# Patient Record
Sex: FEMALE | Race: AMERICAN INDIAN OR ALASKA NATIVE | HISPANIC OR LATINO | Employment: OTHER | URBAN - METROPOLITAN AREA
[De-identification: names, ages, dates, MRNs, and addresses within clinical notes are randomized per-mention and may not be internally consistent; named-entity substitution may affect disease eponyms.]

---

## 2017-01-30 ENCOUNTER — ALLSCRIPTS OFFICE VISIT (OUTPATIENT)
Dept: OTHER | Facility: OTHER | Age: 82
End: 2017-01-30

## 2017-02-23 ENCOUNTER — ALLSCRIPTS OFFICE VISIT (OUTPATIENT)
Dept: OTHER | Facility: OTHER | Age: 82
End: 2017-02-23

## 2017-03-02 ENCOUNTER — LAB CONVERSION - ENCOUNTER (OUTPATIENT)
Dept: OTHER | Facility: OTHER | Age: 82
End: 2017-03-02

## 2017-03-02 ENCOUNTER — GENERIC CONVERSION - ENCOUNTER (OUTPATIENT)
Dept: OTHER | Facility: OTHER | Age: 82
End: 2017-03-02

## 2017-03-02 LAB — HBA1C MFR BLD HPLC: 6.3 % OF TOTAL HGB

## 2017-04-10 ENCOUNTER — ALLSCRIPTS OFFICE VISIT (OUTPATIENT)
Dept: OTHER | Facility: OTHER | Age: 82
End: 2017-04-10

## 2017-06-05 DIAGNOSIS — Z12.31 ENCOUNTER FOR SCREENING MAMMOGRAM FOR MALIGNANT NEOPLASM OF BREAST: ICD-10-CM

## 2017-06-12 ENCOUNTER — HOSPITAL ENCOUNTER (OUTPATIENT)
Dept: RADIOLOGY | Facility: HOSPITAL | Age: 82
Discharge: HOME | End: 2017-06-12
Payer: COMMERCIAL

## 2017-06-12 ENCOUNTER — CONVERSION ENCOUNTER (OUTPATIENT)
Dept: RADIOLOGY | Facility: HOSPITAL | Age: 82
End: 2017-06-12

## 2017-06-12 DIAGNOSIS — Z12.31 VISIT FOR SCREENING MAMMOGRAM: ICD-10-CM

## 2017-06-12 DIAGNOSIS — Z12.31 ENCOUNTER FOR SCREENING MAMMOGRAM FOR MALIGNANT NEOPLASM OF BREAST: ICD-10-CM

## 2017-06-12 PROCEDURE — G0202 SCR MAMMO BI INCL CAD: HCPCS

## 2017-06-13 ENCOUNTER — GENERIC CONVERSION - ENCOUNTER (OUTPATIENT)
Dept: OTHER | Facility: OTHER | Age: 82
End: 2017-06-13

## 2017-09-02 ENCOUNTER — LAB CONVERSION - ENCOUNTER (OUTPATIENT)
Dept: OTHER | Facility: OTHER | Age: 82
End: 2017-09-02

## 2017-09-02 LAB
A/G RATIO (HISTORICAL): 1.6 (CALC) (ref 1–2.5)
ALBUMIN SERPL BCP-MCNC: 4.5 G/DL (ref 3.6–5.1)
ALP SERPL-CCNC: 88 U/L (ref 33–130)
ALT SERPL W P-5'-P-CCNC: 33 U/L (ref 6–29)
AST SERPL W P-5'-P-CCNC: 36 U/L (ref 10–35)
BILIRUB SERPL-MCNC: 0.6 MG/DL (ref 0.2–1.2)
BUN SERPL-MCNC: 30 MG/DL (ref 7–25)
BUN/CREA RATIO (HISTORICAL): 19 (CALC) (ref 6–22)
CALCIUM (ADJUSTED FOR ALBUMIN) (HISTORICAL): 10.2 MG/DL (CALC) (ref 8.6–10.2)
CALCIUM SERPL-MCNC: 10.3 MG/DL (ref 8.6–10.4)
CHLORIDE SERPL-SCNC: 99 MMOL/L (ref 98–110)
CHOLEST SERPL-MCNC: 176 MG/DL
CHOLEST/HDLC SERPL: 3.3 (CALC)
CO2 SERPL-SCNC: 29 MMOL/L (ref 20–31)
CREAT SERPL-MCNC: 1.6 MG/DL (ref 0.6–0.88)
EGFR AFRICAN AMERICAN (HISTORICAL): 35 ML/MIN/1.73M2
EGFR-AMERICAN CALC (HISTORICAL): 30 ML/MIN/1.73M2
GAMMA GLOBULIN (HISTORICAL): 2.9 G/DL (CALC) (ref 1.9–3.7)
GLUCOSE (HISTORICAL): 124 MG/DL (ref 65–99)
HBA1C MFR BLD HPLC: 6.3 % OF TOTAL HGB
HDLC SERPL-MCNC: 54 MG/DL
LDL CHOLESTEROL (HISTORICAL): 105 MG/DL (CALC)
NON-HDL-CHOL (CHOL-HDL) (HISTORICAL): 122 MG/DL (CALC)
POTASSIUM SERPL-SCNC: 4.3 MMOL/L (ref 3.5–5.3)
SODIUM SERPL-SCNC: 141 MMOL/L (ref 135–146)
TOTAL PROTEIN (HISTORICAL): 7.4 G/DL (ref 6.1–8.1)
TRIGL SERPL-MCNC: 80 MG/DL
TSH SERPL DL<=0.05 MIU/L-ACNC: 2.13 MIU/L (ref 0.4–4.5)

## 2017-09-04 ENCOUNTER — GENERIC CONVERSION - ENCOUNTER (OUTPATIENT)
Dept: OTHER | Facility: OTHER | Age: 82
End: 2017-09-04

## 2017-10-05 ENCOUNTER — GENERIC CONVERSION - ENCOUNTER (OUTPATIENT)
Dept: OTHER | Facility: OTHER | Age: 82
End: 2017-10-05

## 2017-10-10 ENCOUNTER — ALLSCRIPTS OFFICE VISIT (OUTPATIENT)
Dept: OTHER | Facility: OTHER | Age: 82
End: 2017-10-10

## 2017-12-04 ENCOUNTER — ALLSCRIPTS OFFICE VISIT (OUTPATIENT)
Dept: OTHER | Facility: OTHER | Age: 82
End: 2017-12-04

## 2017-12-05 NOTE — PROGRESS NOTES
Assessment    1  Acute bilateral low back pain without sciatica ([phone]) (M54 5)    Plan  Acute bilateral low back pain without sciatica    · Cyclobenzaprine HCl - 5 MG Oral Tablet; TAKE 1 TABLET Bedtime PRN SPASM  tylenol and heat for pain   Chief Complaint    1  Back Pain  Patient here with lower back pain for 3 days after pulling bed away from the wall to clean windows going down into right buttock      History of Present Illness  HPI: moved her bed without helphurt friday nighticey/hot, bengay   Back Pain: Melanie Wu presents with complaints of back pain  Associated symptoms include spasm, but-- no stiffness,-- no fever,-- no night sweats,-- no abdominal pain,-- no general malaise,-- no weight loss,-- no arm numbness,-- no leg numbness,-- no arm weakness,-- no leg weakness,-- no urinary incontinence,-- no fecal incontinence,-- no urinary retention-- and-- no rash localized to the area of pain  Review of Systems   Constitutional: No fever, no chills, feels well, no tiredness, no recent weight gain or loss  ENT: no ear ache, no loss of hearing, no nosebleeds or nasal discharge, no sore throat or hoarseness  Cardiovascular: no complaints of slow or fast heart rate, no chest pain, no palpitations, no leg claudication or lower extremity edema  Respiratory: no complaints of shortness of breath, no wheezing, no dyspnea on exertion, no orthopnea or PND  Breasts: no complaints of breast pain, breast lump or nipple discharge  Gastrointestinal: no complaints of abdominal pain, no constipation, no nausea or diarrhea, no vomiting, no bloody stools  Genitourinary: no complaints of dysuria, no incontinence, no pelvic pain, no dysmenorrhea, no vaginal discharge or abnormal vaginal bleeding  Musculoskeletal: as noted in HPI  Integumentary: no complaints of skin rash or lesion, no itching or dry skin, no skin wounds    Neurological: no complaints of headache, no confusion, no numbness or tingling, no dizziness or fainting  Active Problems  1  Aneurysm of ascending aorta (441 2) (I71 2)   2  Aortic aneurysm (441 9) (I71 9)   3  Arteriosclerosis of carotid artery (433 10) (I65 29)   4  Arteriosclerosis of coronary artery (414 00) (I25 10)   5  CAD in native artery (414 01) (I25 10)   6  Carotid atherosclerosis, unspecified laterality (433 10) (I65 29)   7  Chronic kidney disease, stage 3 (585 3) (N18 3)   8  GERD without esophagitis (530 81) (K21 9)   9  Glaucoma screening (V80 1) (Z13 5)   10  Hyperlipidemia (272 4) (E78 5)   11  Hypertension (401 9) (I10)   12  Hypertensive heart disease (402 90) (I11 9)   13  Impaired fasting glucose (790 21) (R73 01)   14  MÃÂ©niÃÂ¨re's disease (386 00) (H81 09)   15  Need for pneumococcal vaccination (V03 82) (Z23)   16  Need for prophylactic vaccination and inoculation against influenza (V04 81) (Z23)   17  Osteoporosis screening (V82 81) (Z13 820)   18  Polyp of sigmoid colon (211 3) (D12 5)   19  Renal Insufficiency (593 9)   20  Special screening for other neurological conditions (V80 09) (Z13 89)   21  Visit for screening mammogram (V76 12) (Z12 31)   22  Well adult on routine health check (V70 0) (Z00 00)    Past Medical History  1  History of Acute bronchitis due to other specified organisms (466 0) (J20 8)   2  History of Dysuria (788 1) (R30 0)   3  History of hematuria (V13 09) (Z87 448)   4  History of shortness of breath (V13 89) (Z87 898)   5  History of urinary tract infection (V13 02) (Z87 440)   6  Need for prophylactic vaccination and inoculation against influenza (V04 81) (Z23)    Family History  Mother    1  Family history of Diabetes Mellitus (V18 0)   2  Family history of Glaucoma   3  Family history of Hypertension (V17 49)   4  Family history of Macular Degeneration  Father    5  Family history of Hypertension (V17 49)   6  Family history of Stroke Syndrome (V17 1)  Sister    7  Family history of dementia (V17 2) (Z81 8)   8   Family history of Heart Disease (V17 49)  Family History    9  Family history of Family Health Status 2  Children Living   10  Family history of Family Health Status Siblings 6  Living Sisters    Social History   · Denied: History of Alcohol Use (History)   · Daily Coffee Consumption (1  Cups/Day)   · Denied: History of Drug Use   · Former smoker (F49 29) (C98 007)    Surgical History    1  History of Adenoidectomy   2  History of Appendectomy   3  History of Cataract Surgery   4  History of Cervical Vertebral Fusion   5  History of Hysterectomy   6  History of Paravaginal Defect Graft-Reinforced Repair   7  History of Rectocele Repair   8  History of Tonsillectomy   9  History of Venous Ligation With Stripping    Current Meds   1  Aspirin 81 MG TABS; take 1 tab daily; Therapy: (Recorded:89Jcw1109) to Recorded   2  Metoprolol Tartrate 50 MG Oral Tablet; take 1 tab daily; Therapy: (Recorded:42Aoy9967) to Recorded   3  Omeprazole 20 MG Oral Capsule Delayed Release; take 1 capsule by mouth daily; Therapy: 45CXC9664 to (Evaluate:34Icr7985)  Requested for: 65Mcw2297; Last Rx:57Swl1588 Ordered   4  Triamterene-HCTZ 37 5-25 MG Oral Tablet; take 1 tablet by mouth once daily; Therapy: 38WDF2712 to (Teresa Dye)  Requested for: 98Jtr7129; Last Rx:46Mox2371 Ordered    Allergies  1  No Known Drug Allergies  2  No Known Environmental Allergies   3  No Known Food Allergies    Vitals   Recorded: 58HCQ7661 11:17AM   Heart Rate 60   Respiration 16   Systolic 556   Diastolic 60   Height 5 ft 3 in   Weight 155 lb 6 oz   BMI Calculated 27 52   BSA Calculated 1 74   Pain Scale 9       Physical Exam   Musculoskeletal  Gait and station: Normal    Inspection/palpation of joints, bones, and muscles: Abnormal  -- paraspinal muscle tenderness          Future Appointments    Date/Time Provider Specialty Site   04/10/2018 08:00 AM Esperanza Lopez DO Family Medicine [facility]   10/11/2018 08:00 AM Esperanza Lopez DO [facility] [address]   Electronically signed by : Alice Bautista DO; Dec  4 2017 11:28AM EST                       (Author)

## 2017-12-07 ENCOUNTER — APPOINTMENT (EMERGENCY)
Dept: RADIOLOGY | Facility: HOSPITAL | Age: 82
End: 2017-12-07
Payer: COMMERCIAL

## 2017-12-07 ENCOUNTER — HOSPITAL ENCOUNTER (EMERGENCY)
Facility: HOSPITAL | Age: 82
Discharge: HOME | End: 2017-12-07
Admitting: EMERGENCY MEDICINE
Payer: COMMERCIAL

## 2017-12-07 VITALS
TEMPERATURE: 96.8 F | BODY MASS INDEX: 26.46 KG/M2 | OXYGEN SATURATION: 95 % | SYSTOLIC BLOOD PRESSURE: 138 MMHG | RESPIRATION RATE: 18 BRPM | DIASTOLIC BLOOD PRESSURE: 65 MMHG | WEIGHT: 155 LBS | HEART RATE: 58 BPM | HEIGHT: 64 IN

## 2017-12-07 DIAGNOSIS — S33.5XXA LUMBAR SPRAIN, INITIAL ENCOUNTER: Primary | ICD-10-CM

## 2017-12-07 PROCEDURE — 72100 X-RAY EXAM L-S SPINE 2/3 VWS: CPT

## 2017-12-07 PROCEDURE — 99283 EMERGENCY DEPT VISIT LOW MDM: CPT

## 2017-12-07 RX ORDER — ACETAMINOPHEN 325 MG/1
650 TABLET ORAL ONCE
Status: COMPLETED | OUTPATIENT
Start: 2017-12-07 | End: 2017-12-07

## 2017-12-07 RX ORDER — OMEPRAZOLE 20 MG/1
20 CAPSULE, DELAYED RELEASE ORAL DAILY
COMMUNITY
End: 2018-12-12 | Stop reason: SDUPTHER

## 2017-12-07 RX ORDER — TRIAMTERENE AND HYDROCHLOROTHIAZIDE 37.5; 25 MG/1; MG/1
TABLET ORAL
COMMUNITY
Start: 2016-09-16 | End: 2018-09-04 | Stop reason: SDUPTHER

## 2017-12-07 RX ORDER — METOPROLOL TARTRATE 50 MG/1
TABLET, FILM COATED ORAL
COMMUNITY
Start: 2016-07-25 | End: 2018-07-08 | Stop reason: SDUPTHER

## 2017-12-07 RX ORDER — LIDOCAINE 50 MG/G
1 PATCH TOPICAL DAILY
Qty: 30 PATCH | Refills: 0 | Status: SHIPPED | OUTPATIENT
Start: 2017-12-07 | End: 2018-04-05 | Stop reason: ALTCHOICE

## 2017-12-07 RX ORDER — LIDOCAINE 50 MG/G
1 PATCH TOPICAL ONCE
Status: DISCONTINUED | OUTPATIENT
Start: 2017-12-07 | End: 2017-12-07 | Stop reason: HOSPADM

## 2017-12-07 RX ADMIN — ACETAMINOPHEN 650 MG: 325 TABLET, FILM COATED ORAL at 11:01

## 2017-12-07 RX ADMIN — LIDOCAINE 1 PATCH: 50 PATCH TOPICAL at 11:01

## 2017-12-07 NOTE — DISCHARGE INSTRUCTIONS
Acute Low Back Pain   WHAT YOU NEED TO KNOW:   Acute low back pain is sudden discomfort in your lower back area that lasts for up to 6 weeks  The discomfort makes it difficult to tolerate activity  DISCHARGE INSTRUCTIONS:   Return to the emergency department if:   · You have severe pain  · You have sudden stiffness and heaviness on both buttocks down to both legs  · You have numbness or weakness in one leg, or pain in both legs  · You have numbness in your genital area or across your lower back  · You cannot control your urine or bowel movements  Contact your healthcare provider if:   · You have a fever  · You have pain at night or when you rest     · Your pain does not get better with treatment  · You have pain that worsens when you cough or sneeze  · You suddenly feel something pop or snap in your back  · You have questions or concerns about your condition or care  Medicines: The following medicines may be ordered by your healthcare provider:  · Acetaminophen  decreases pain  It is available without a doctor's order  Ask how much to take and how often to take it  Follow directions  Acetaminophen can cause liver damage if not taken correctly  · NSAIDs  help decrease swelling and pain  This medicine is available with or without a doctor's order  NSAIDs can cause stomach bleeding or kidney problems in certain people  If you take blood thinner medicine, always ask your healthcare provider if NSAIDs are safe for you  Always read the medicine label and follow directions  · Prescription pain medicine  may be given  Ask your healthcare provider how to take this medicine safely  · Muscle relaxers  decrease pain by relaxing the muscles in your lower spine  · Take your medicine as directed  Contact your healthcare provider if you think your medicine is not helping or if you have side effects  Tell him of her if you are allergic to any medicine   Keep a list of the medicines, vitamins, and herbs you take  Include the amounts, and when and why you take them  Bring the list or the pill bottles to follow-up visits  Carry your medicine list with you in case of an emergency  Self-care:   · Stay active  as much as you can without causing more pain  Bed rest could make your back pain worse  Start with some light exercises such as walking  Avoid heavy lifting until your pain is gone  Ask for more information about the activities or exercises that are right for you  · Ice  helps decrease swelling, pain, and muscle spams  Put crushed ice in a plastic bag  Cover it with a towel  Place it on your lower back for 20 to 30 minutes every 2 hours  Do this for about 2 to 3 days after your pain starts, or as directed  · Heat  helps decrease pain and muscle spasms  Start to use heat after treatment with ice has stopped  Use a small towel dampened with warm water or a heating pad, or sit in a warm bath  Apply heat on the area for 20 to 30 minutes every 2 hours for as many days as directed  Alternate heat and ice  Prevent acute low back pain:   · Use proper body mechanics  ¨ Bend at the hips and knees when you  objects  Do not bend from the waist  Use your leg muscles as you lift the load  Do not use your back  Keep the object close to your chest as you lift it  Try not to twist or lift anything above your waist     ¨ Change your position often when you stand for long periods of time  Rest one foot on a small box or footrest, and then switch to the other foot often  ¨ Try not to sit for long periods of time  When you do, sit in a straight-backed chair with your feet flat on the floor  Never reach, pull, or push while you are sitting  · Do exercises that strengthen your back muscles  Warm up before you exercise  Ask your healthcare provider the best exercises for you  · Maintain a healthy weight  Ask your healthcare provider how much you should weigh   Ask him to help you create a weight loss plan if you are overweight  Follow up with your healthcare provider as directed:  Return for a follow-up visit if you still have pain after 1 to 3 weeks of treatment  You may need to visit an orthopedist if your back pain lasts more than 12 weeks  Write down your questions so you remember to ask them during your visits  © 2017 [employer]  Information is for End User's use only and may not be sold, redistributed or otherwise used for commercial purposes  All illustrations and images included in CareNotes® are the copyrighted property of [employer]  or [address]  The above information is an  only  It is not intended as medical advice for individual conditions or treatments  Talk to your doctor, nurse or pharmacist before following any medical regimen to see if it is safe and effective for you

## 2018-01-11 NOTE — RESULT NOTES
Discussion/Summary   KIDNEY FUNCTION SLIGHTLY WORSE  SUGAR IS OK  WILL DISCUSS WITH YOU AT APPT  DR Arian Prakash     Verified Results  (Q) COMPREHENSIVE METABOLIC PNL W/ADJUSTED CALCIUM 01Sep2017 07:22AM [employer]     Test Name Result Flag Reference   GLUCOSE 124 mg/dL H 65-99   Fasting reference interval     For someone without known diabetes, a glucose value  between 100 and 125 mg/dL is consistent with  prediabetes and should be confirmed with a  follow-up test    UREA NITROGEN (BUN) 30 mg/dL H 7-25   CREATININE 1 60 mg/dL H 0 60-0 88   For patients >52years of age, the reference limit  for Creatinine is approximately 13% higher for people  identified as -American  eGFR NON-AFR  AMERICAN 30 mL/min/1 73m2 L > OR = 60   eGFR AFRICAN AMERICAN 35 mL/min/1 73m2 L > OR = 60   BUN/CREATININE RATIO 19 (calc)  6-22   SODIUM 141 mmol/L  135-146   POTASSIUM 4 3 mmol/L  3 5-5 3   CHLORIDE 99 mmol/L     CARBON DIOXIDE 29 mmol/L  20-31   CALCIUM 10 3 mg/dL  8 6-10 4   CALCIUM (ADJUSTED FOR$ALBUMIN) 10 2 mg/dL (calc)  8 6-10 2   PROTEIN, TOTAL 7 4 g/dL  6 1-8 1   ALBUMIN 4 5 g/dL  3 6-5 1   GLOBULIN 2 9 g/dL (calc)  1 9-3 7   ALBUMIN/GLOBULIN RATIO 1 6 (calc)  1 0-2 5   BILIRUBIN, TOTAL 0 6 mg/dL  0 2-1 2   ALKALINE PHOSPHATASE 88 U/L     AST 36 U/L H 10-35   ALT 33 U/L H 6-29     (Q) LIPID PANEL WITH REFLEX TO DIRECT LDL 01Sep2017 07:22AM [employer]     Test Name Result Flag Reference   CHOLESTEROL, TOTAL 176 mg/dL  <200   HDL CHOLESTEROL 54 mg/dL  >53   TRIGLICERIDES 80 mg/dL  <433   LDL-CHOLESTEROL 105 mg/dL (calc) H    Reference range: <100     Desirable range <100 mg/dL for patients with CHD or  diabetes and <70 mg/dL for diabetic patients with  known heart disease       The [facility] calculation is a validated novel   method that provides better accuracy than the   Friedewald equation in the estimation of LDL-C,   particularly when TG levels are 150-400 mg/dL and   LDL-C levels are lower than 70 mg/dL  Reference:  Ethyl Bessie et al  Comparison of a Novel   Method vs the Friedewald Equation for Estimating   Low-Density Lipoprotein Cholesterol Levels From the   Standard Lipid Profile  BONG  8770;283(94): 0444-3342  For additional information, please refer to  http://[employer]/faq/HTP795  (This link is being provided for informational/  educational purposes only )   CHOL/HDLC RATIO 3 3 (calc)  <5 0   NON HDL CHOLESTEROL 122 mg/dL (calc)  <130   For patients with diabetes plus 1 major ASCVD risk   factor, treating to a non-HDL-C goal of <100 mg/dL   (LDL-C of <70 mg/dL) is considered a therapeutic   option  (Q) TSH, 3RD GENERATION W/REFLEX TO FT4 [id number] 07:22AM [facility]     Test Name Result Flag Reference   TSH W/REFLEX TO FT4 2 13 mIU/L  0 40-4 50     (Q) HEMOGLOBIN A1c [id number] 07:22AM Adriana Gresham   REPORT COMMENT:  FASTING:YES     Test Name Result Flag Reference   HEMOGLOBIN A1c 6 3 % of total Hgb H <5 7   For someone without known diabetes, a hemoglobin   A1c value between 5 7% and 6 4% is consistent with  prediabetes and should be confirmed with a   follow-up test      For someone with known diabetes, a value <7%  indicates that their diabetes is well controlled  A1c  targets should be individualized based on duration of  diabetes, age, comorbid conditions, and other  considerations  This assay result is consistent with an increased risk  of diabetes  Currently, no consensus exists regarding use of  hemoglobin A1c for diagnosis of diabetes for children

## 2018-01-11 NOTE — RESULT NOTES
Verified Results  (Q) COMPREHENSIVE METABOLIC PNL W/ADJUSTED CALCIUM 01Sep2016 08:29AM Yun[facility] Nieto     Test Name Result Flag Reference   GLUCOSE 119 mg/dL H 65-99   Fasting reference interval   UREA NITROGEN (BUN) 33 mg/dL H 7-25   CREATININE 1 35 mg/dL H 0 60-0 93   For patients >52years of age, the reference limit  for Creatinine is approximately 13% higher for people  identified as -American  eGFR NON-AFR  AMERICAN 37 mL/min/1 73m2 L > OR = 60   eGFR AFRICAN AMERICAN 43 mL/min/1 73m2 L > OR = 60   BUN/CREATININE RATIO 24 (calc) H 6-22   SODIUM 138 mmol/L  135-146   POTASSIUM 3 8 mmol/L  3 5-5 3   CHLORIDE 100 mmol/L     CARBON DIOXIDE 27 mmol/L  20-31   CALCIUM 9 8 mg/dL  8 6-10 4   CALCIUM (ADJUSTED FOR$ALBUMIN) 9 8 mg/dL (calc)  8 6-10 2   PROTEIN, TOTAL 7 2 g/dL  6 1-8 1   ALBUMIN 4 4 g/dL  3 6-5 1   GLOBULIN 2 8 g/dL (calc)  1 9-3 7   ALBUMIN/GLOBULIN RATIO 1 6 (calc)  1 0-2 5   BILIRUBIN, TOTAL 0 6 mg/dL  0 2-1 2   ALKALINE PHOSPHATASE 85 U/L     AST 27 U/L  10-35   ALT 29 U/L  6-29     (Q) LIPID PANEL WITH REFLEX TO DIRECT LDL 01Sep2016 08:29AM Yunier Gerson     Test Name Result Flag Reference   CHOLESTEROL, TOTAL 172 mg/dL  125-200   HDL CHOLESTEROL 53 mg/dL  > OR = 46   TRIGLICERIDES 80 mg/dL  <101   LDL-CHOLESTEROL 103 mg/dL (calc)  <130   Desirable range <100 mg/dL for patients with CHD or  diabetes and <70 mg/dL for diabetic patients with  known heart disease  CHOL/HDLC RATIO 3 2 (calc)  < OR = 5 0   NON HDL CHOLESTEROL 119 mg/dL (calc)     Target for non-HDL cholesterol is 30 mg/dL higher than   LDL cholesterol target       (Q) TSH, 3RD GENERATION W/REFLEX TO FT4 01Sep2016 08:29AM Yunier Nieto     Test Name Result Flag Reference   TSH W/REFLEX TO FT4 1 72 mIU/L  0 40-4 50     (Q) HEMOGLOBIN A1c 01Sep2016 08:29AM Evetta Raider   REPORT COMMENT:  FASTING:YES     Test Name Result Flag Reference   HEMOGLOBIN A1c 6 5 % of total Hgb H <5 7   According to ADA guidelines, hemoglobin A1c <7 0%  represents optimal control in non-pregnant diabetic  patients  Different metrics may apply to specific  patient populations  Standards of Medical Care in  [phone]  Diabetes Care  2013;36:s11-s66     For the purpose of screening for the presence of  diabetes  <5 7%       Consistent with the absence of diabetes  5 7-6 4%    Consistent with increased risk for diabetes              (prediabetes)  >or=6 5%    Consistent with diabetes     This assay result is consistent with diabetes  mellitus  Currently, no consensus exists for use of hemoglobin  A1c for diagnosis of diabetes for children  Discussion/Summary   BLOOD SUGAR IS UP  WE WILL DISCUSS AT NEXT VISIT     Yara Ricks

## 2018-01-11 NOTE — PROGRESS NOTES
Assessment    1  Encounter for preventive health examination (V70 0) (Z00 00)   2  Aortic aneurysm (441 9) (I71 9)    Plan  Health Maintenance    · Follow-up visit in 1 year Evaluation and Treatment  Follow-up  Status: Hold For -  Scheduling  Requested for: 66FOA4251   · Eat a low fat and low cholesterol diet ; Status:Complete;   Done: 65RUI8191   · There are many exercise options for seniors ; Status:Complete;   Done: 03VMY0156   · Call [phone] if: You find a new or different kind of lump in your breast ;  Status:Complete;   Done: 57NIY2873   · Call [phone] if: You have any bleeding from the vagina ; Status:Complete;    Done: 60CAK4426   · Call [phone] if: You have any warning signs of skin cancer ; Status:Complete;    Done: 89KAP4830   · Call [phone] if: You experience a new kind of chest pain (angina) or pressure ;  Status:Complete;   Done: 22KQX8189  Hyperlipidemia, Hypertension, Impaired fasting glucose, Renal Insufficiency    · (1) HEMOGLOBIN A1C; Status:Active; Requested for:02Apr2018;    · (Q) COMPREHENSIVE METABOLIC PNL W/ADJUSTED CALCIUM; Status:Active; Requested for:02Apr2018;    · (Q) LIPID PANEL WITH REFLEX TO DIRECT LDL; Status:Active; Requested  for:02Apr2018;    · (Q) TSH, 3RD GENERATION W/REFLEX TO FT4; Status:Active; Requested  for:02Apr2018; Discussion/Summary  health maintenance visit Currently, she eats a healthy diet and has an adequate exercise regimen  cervical cancer screening is not indicated Breast cancer screening: mammogram is current  Colorectal cancer screening: colorectal cancer screening is current  Osteoporosis screening: bone mineral density testing is current  The immunizations are up to date  She was advised to be evaluated by an ophthalmologist  Advice and education were given regarding aerobic exercise  Patient discussion: discussed with the patient  Hepatitis C Screening: the patient was counseled on Hepatitis C screening   The patient declines Hepatitis C screening  Chief Complaint  Patient here for annual wellness exam      History of Present Illness  HM, Adult Female: The patient is being seen for a health maintenance evaluation  General Health: The patient's health since the last visit is described as good  She has regular dental visits  She denies vision problems  She denies hearing loss  Immunizations status: up to date  Lifestyle:  She consumes a diverse and healthy diet  She does not have any weight concerns  She exercises regularly  She does not use tobacco  She denies alcohol use  She denies drug use  Screening: cancer screening reviewed and updated  Cervical cancer screening includes uncertain timing of her last pap smear  Breast cancer screening includes a mammogram performed last year  Colorectal cancer screening includes a colonoscopy performed within the past ten years  metabolic screening reviewed and updated  Metabolic screening includes lipid profile performed within the past five years, glucose screening performed last year and thyroid function test performed last year  Review of Systems    Constitutional: No fever, no chills, feels well, no tiredness, no recent weight gain or weight loss  Eyes: No complaints of eye pain, no red eyes, no eyesight problems, no discharge, no dry eyes, no itching of eyes  ENT: no complaints of earache, no loss of hearing, no nose bleeds, no nasal discharge, no sore throat, no hoarseness  Cardiovascular: No complaints of slow heart rate, no fast heart rate, no chest pain, no palpitations, no leg claudication, no lower extremity edema  Respiratory: No complaints of shortness of breath, no wheezing, no cough, no SOB on exertion, no orthopnea, no PND  Gastrointestinal: No complaints of abdominal pain, no constipation, no nausea or vomiting, no diarrhea, no bloody stools     Genitourinary: No complaints of dysuria, no incontinence, no pelvic pain, no dysmenorrhea, no vaginal discharge or bleeding  Musculoskeletal: No complaints of arthralgias, no myalgias, no joint swelling or stiffness, no limb pain or swelling  Integumentary: No complaints of skin rash or lesions, no itching, no skin wounds, no breast pain or lump  Neurological: No complaints of headache, no confusion, no convulsions, no numbness, no dizziness or fainting, no tingling, no limb weakness, no difficulty walking  Psychiatric: Not suicidal, no sleep disturbance, no anxiety or depression, no change in personality, no emotional problems  Endocrine: No complaints of proptosis, no hot flashes, no muscle weakness, no deepening of the voice, no feelings of weakness  Hematologic/Lymphatic: No complaints of swollen glands, no swollen glands in the neck, does not bleed easily, does not bruise easily  Active Problems    1  Aneurysm of ascending aorta (441 2) (I71 2)   2  Aortic aneurysm (441 9) (I71 9)   3  Arteriosclerosis of carotid artery (433 10) (I65 29)   4  Arteriosclerosis of coronary artery (414 00) (I25 10)   5  CAD in native artery (414 01) (I25 10)   6  Carotid atherosclerosis, unspecified laterality (433 10) (I65 29)   7  Chronic kidney disease, stage 3 (585 3) (N18 3)   8  GERD without esophagitis (530 81) (K21 9)   9  Glaucoma screening (V80 1) (Z13 5)   10  Hyperlipidemia (272 4) (E78 5)   11  Hypertension (401 9) (I10)   12  Hypertensive heart disease (402 90) (I11 9)   13  Impaired fasting glucose (790 21) (R73 01)   14  MÃÂ©niÃÂ¨re's disease (386 00) (H81 09)   15  Need for pneumococcal vaccination (V03 82) (Z23)   16  Need for prophylactic vaccination and inoculation against influenza (V04 81) (Z23)   17  Osteoporosis screening (V82 81) (Z13 820)   18  Polyp of sigmoid colon (211 3) (D12 5)   19  Renal Insufficiency (593 9)   20  Special screening for other neurological conditions (V80 09) (Z13 89)   21  Visit for screening mammogram (V76 12) (Z12 31)   22   Well adult on routine health check (V70 0) (Z00 00)    Past Medical History    · History of Acute bronchitis due to other specified organisms (466 0) (J20 8)   · History of Dysuria (788 1) (R30 0)   · History of hematuria (V13 09) (Z87 448)   · History of shortness of breath (V13 89) (S50 636)   · History of urinary tract infection (V13 02) (Z87 440)   · Need for prophylactic vaccination and inoculation against influenza (V04 81) (Z23)    Surgical History    · History of Adenoidectomy   · History of Appendectomy   · History of Cataract Surgery   · History of Cervical Vertebral Fusion   · History of Hysterectomy   · History of Paravaginal Defect Graft-Reinforced Repair   · History of Rectocele Repair   · History of Tonsillectomy   · History of Venous Ligation With Stripping    Family History  Mother    · Family history of Diabetes Mellitus (V18 0)   · Family history of Glaucoma   · Family history of Hypertension (V17 49)   · Family history of Macular Degeneration  Father    · Family history of Hypertension (V17 49)   · Family history of Stroke Syndrome (V17 1)  Sister    · Family history of dementia (V17 2) (Z81 8)   · Family history of Heart Disease (V17 49)  Family History    · Family history of Family Health Status 2  Children Living   · Family history of Family Health Status Siblings 6  Living Sisters    Social History    · Denied: History of Alcohol Use (History)   · Daily Coffee Consumption (1  Cups/Day)   · Denied: History of Drug Use   · Former smoker (V15 82) (K14 951)    Current Meds   1  Aspirin 81 MG TABS; take 1 tab daily; Therapy: (Recorded:62Xbj4410) to Recorded   2  Metoprolol Tartrate 50 MG Oral Tablet; take 1 tab daily; Therapy: (Recorded:94Ull5496) to Recorded   3  Omeprazole 20 MG Oral Capsule Delayed Release; take 1 capsule by mouth daily; Therapy: 84AEA1216 to (Evaluate:61Obp1407)  Requested for: 33Zqt1658; Last   Rx:[id number] Ordered   4  Triamterene-HCTZ 37 5-25 MG Oral Tablet; take 1 tablet by mouth once daily;    Therapy: 15AIH1493 to ([facility])  Requested for: 10Sep2017; Last   Rx:[id number] Ordered    Allergies    1  No Known Drug Allergies    2  No Known Environmental Allergies   3  No Known Food Allergies    Vitals   Recorded: 32FCD6290 07:47AM   Heart Rate 64   Respiration 18   Systolic 723   Diastolic 60   Height 5 ft 3 in   Weight 152 lb 3 oz   BMI Calculated 26 96   BSA Calculated 1 72     Physical Exam    Constitutional   General appearance: No acute distress, well appearing and well nourished  Head and Face   Head and face: Normal     Eyes   Conjunctiva and lids: No swelling, erythema or discharge  Pupils and irises: Equal, round, reactive to light  Ears, Nose, Mouth, and Throat   External inspection of ears and nose: Normal     Otoscopic examination: Tympanic membranes translucent with normal light reflex  Canals patent without erythema  Hearing: Normal     Nasal mucosa, septum, and turbinates: Normal without edema or erythema  Lips, teeth, and gums: Normal, good dentition  Oropharynx: Normal with no erythema, edema, exudate or lesions  Neck   Neck: Supple, symmetric, trachea midline, no masses  Thyroid: Normal, no thyromegaly  Pulmonary   Respiratory effort: No increased work of breathing or signs of respiratory distress  Auscultation of lungs: Clear to auscultation  Cardiovascular   Auscultation of heart: Normal rate and rhythm, normal S1 and S2, no murmurs  Examination of extremities for edema and/or varicosities: Normal     Abdomen   Abdomen: Non-tender, no masses  Liver and spleen: No hepatomegaly or splenomegaly  Lymphatic   Palpation of lymph nodes in neck: No lymphadenopathy  Palpation of lymph nodes in axillae: No lymphadenopathy  Musculoskeletal   Gait and station: Normal     Digits and nails: Normal without clubbing or cyanosis      Joints, bones, and muscles: Normal     Range of motion: Normal     Stability: Normal     Muscle strength/tone: Normal     Skin Skin and subcutaneous tissue: Normal without rashes or lesions  Palpation of skin and subcutaneous tissue: Normal turgor  Neurologic   Cranial nerves: Cranial nerves II-XII intact  Cortical function: Normal mental status  Reflexes: 2+ and symmetric  Sensation: No sensory loss  Coordination: Normal finger to nose and heel to shin  Psychiatric   Judgment and insight: Normal     Orientation to person, place, and time: Normal     Recent and remote memory: Intact      Mood and affect: Normal        Signatures   Electronically signed by : Baisa Mtz DO; Oct 10 2017  8:13AM EST                       (Author)

## 2018-01-12 VITALS
BODY MASS INDEX: 27.5 KG/M2 | SYSTOLIC BLOOD PRESSURE: 90 MMHG | RESPIRATION RATE: 18 BRPM | DIASTOLIC BLOOD PRESSURE: 68 MMHG | HEART RATE: 62 BPM | TEMPERATURE: 97.3 F | WEIGHT: 157.25 LBS

## 2018-01-12 NOTE — RESULT NOTES
Verified Results  REFLEXIVE URINE CULTURE 21Nov2016 12:00AM Denisse Jostin     Test Name Result Flag Reference   CULTURE, URINE, ROUTINE  A    CULTURE, URINE, ROUTINE         MICRO NUMBER:      [id number]    TEST STATUS:       FINAL    SPECIMEN SOURCE:   URINE    SPECIMEN QUALITY:  ADEQUATE    RESULT:            Greater than 100,000 CFU/mL of Escherichia coli                            E coli                            ----------------                            INT   GIANFRANCO     AMOX/CLAVULANATE       S     <=2 0     AMPICILLIN             S     <=2 0     AMP/SULBACTAM          S     <=2 0     CEFAZOLIN              NR    <=4 0 **1     CEFEPIME               S     <=1 0     CEFTRIAXONE            S     <=1 0     CIPROFLOXACIN          S     <=0 25     ERTAPENEM              S     <=0 5     GENTAMICIN             S     <=1 0     IMIPENEM               S     <=0 25     LEVOFLOXACIN           S     <=0 12     NITROFURANTOIN         S     <=16 0     PIP/TAZOBACTAM         S     <=4 0     TOBRAMYCIN             S     <=1 0     TRIMETHOPRIM/SULFA     S     <=20 0  S=Susceptible  I=Intermediate  R=Resistant  * = Not Tested  NR = Not Reported  **NN = See Therapy Comments  THERAPY COMMENTS      Note 1:      ORAL therapy: A cefazolin GIANFRANCO of < 32 predicts      susceptibility to the oral agents cefaclor,      cefdinir, cefpodoxime, cefprozil, cefuroxime,      cephalexin, and loracarbef when used for therapy      of uncomplicated UTIs due to E  coli,      K  pneumoniae, and P  mirabilis  PARENTERAL therapy: A cefazolin GIANFRANCO of > 8      indicates resistance to parenteral cefazolin  An alternate test method must be performed to      to confirm susceptibility to parenteral cefazolin

## 2018-01-12 NOTE — RESULT NOTES
Verified Results  (Q) COMPREHENSIVE METABOLIC PNL W/ADJUSTED CALCIUM [id number] 07:04AM Yunier Nieto     Test Name Result Flag Reference   GLUCOSE 121 mg/dL H 65-99   Fasting reference interval   UREA NITROGEN (BUN) 28 mg/dL H 7-25   CREATININE 1 55 mg/dL H 0 60-0 93   For patients >52years of age, the reference limit  for Creatinine is approximately 13% higher for people  identified as -American  eGFR NON-AFR  AMERICAN 32 mL/min/1 73m2 L > OR = 60   eGFR AFRICAN AMERICAN 37 mL/min/1 73m2 L > OR = 60   BUN/CREATININE RATIO 18 (calc)  6-22   SODIUM 139 mmol/L  135-146   POTASSIUM 3 8 mmol/L  3 5-5 3   CHLORIDE 98 mmol/L     CARBON DIOXIDE 30 mmol/L  19-30   CALCIUM 9 8 mg/dL  8 6-10 4   CALCIUM (ADJUSTED FOR$ALBUMIN) 9 7 mg/dL (calc)  8 6-10 2   PROTEIN, TOTAL 7 4 g/dL  6 1-8 1   ALBUMIN 4 6 g/dL  3 6-5 1   GLOBULIN 2 8 g/dL (calc)  1 9-3 7   ALBUMIN/GLOBULIN RATIO 1 6 (calc)  1 0-2 5   BILIRUBIN, TOTAL 0 7 mg/dL  0 2-1 2   ALKALINE PHOSPHATASE 82 U/L     AST 28 U/L  10-35   ALT 26 U/L  6-29     (Q) LIPID PANEL WITH REFLEX TO DIRECT LDL [id number] 07:04AM [employer]ier [employer]     Test Name Result Flag Reference   CHOLESTEROL, TOTAL 191 mg/dL  125-200   HDL CHOLESTEROL 55 mg/dL  > OR = 46   TRIGLICERIDES 83 mg/dL  <545   LDL-CHOLESTEROL 119 mg/dL (calc)  <130   Desirable range <100 mg/dL for patients with CHD or  diabetes and <70 mg/dL for diabetic patients with  known heart disease  CHOL/HDLC RATIO 3 5 (calc)  < OR = 5 0   NON HDL CHOLESTEROL 136 mg/dL (calc)     Target for non-HDL cholesterol is 30 mg/dL higher than   LDL cholesterol target       (Q) TSH, 3RD GENERATION W/REFLEX TO FT4 [id number] 07:04AM [employer]     Test Name Result Flag Reference   TSH W/REFLEX TO FT4 2 53 mIU/L  0 40-4 50     (Q) HEMOGLOBIN A1c [id number] 07:04AM Evetta Raider     Test Name Result Flag Reference   HEMOGLOBIN A1c 6 4 % of total Hgb H <5 7   According to ADA guidelines, hemoglobin A1c <7 0%  represents optimal control in non-pregnant diabetic  patients  Different metrics may apply to specific  patient populations  Standards of Medical Care in    Diabetes Care  2013;36:s11-s66     For the purpose of screening for the presence of  diabetes  <5 7%       Consistent with the absence of diabetes  5 7-6 4%    Consistent with increased risk for diabetes              (prediabetes)  >or=6 5%    Consistent with diabetes     This assay result is consistent with a higher risk  of diabetes  Currently, no consensus exists for use of hemoglobin  A1c for diagnosis of diabetes for children  Discussion/Summary   OVERALL GOOD     Amauri Keep

## 2018-01-13 VITALS
HEIGHT: 63 IN | RESPIRATION RATE: 18 BRPM | SYSTOLIC BLOOD PRESSURE: 110 MMHG | DIASTOLIC BLOOD PRESSURE: 60 MMHG | BODY MASS INDEX: 26.96 KG/M2 | WEIGHT: 152.19 LBS | HEART RATE: 64 BPM

## 2018-01-14 VITALS
HEIGHT: 63 IN | BODY MASS INDEX: 27.82 KG/M2 | WEIGHT: 157 LBS | DIASTOLIC BLOOD PRESSURE: 72 MMHG | HEART RATE: 64 BPM | SYSTOLIC BLOOD PRESSURE: 140 MMHG

## 2018-01-14 VITALS
SYSTOLIC BLOOD PRESSURE: 107 MMHG | HEART RATE: 60 BPM | RESPIRATION RATE: 16 BRPM | WEIGHT: 157 LBS | DIASTOLIC BLOOD PRESSURE: 68 MMHG | BODY MASS INDEX: 27.46 KG/M2

## 2018-01-14 NOTE — RESULT NOTES
Verified Results  (Q) COMPREHENSIVE METABOLIC PNL W/ADJUSTED CALCIUM [id number] 08:16AM Andrew Welch     Test Name Result Flag Reference   GLUCOSE 132 mg/dL H 65-99   Fasting reference interval   UREA NITROGEN (BUN) 30 mg/dL H 7-25   CREATININE 1 41 mg/dL H 0 60-0 88   For patients >52years of age, the reference limit  for Creatinine is approximately 13% higher for people  identified as -American  eGFR NON-AFR  AMERICAN 35 mL/min/1 73m2 L > OR = 60   eGFR AFRICAN AMERICAN 41 mL/min/1 73m2 L > OR = 60   BUN/CREATININE RATIO 21 (calc)  6-22   AST 37 U/L H 10-35   ALT 33 U/L H 6-29   PROTEIN, TOTAL 7 4 g/dL  6 1-8 1   ALBUMIN 4 4 g/dL  3 6-5 1   GLOBULIN 3 0 g/dL (calc)  1 9-3 7   ALBUMIN/GLOBULIN RATIO 1 5 (calc)  1 0-2 5   BILIRUBIN, TOTAL 0 7 mg/dL  0 2-1 2   ALKALINE PHOSPHATASE 93 U/L     SODIUM 139 mmol/L  135-146   POTASSIUM 3 9 mmol/L  3 5-5 3   CHLORIDE 99 mmol/L     CARBON DIOXIDE 29 mmol/L  20-31   CALCIUM 10 1 mg/dL  8 6-10 4   CALCIUM (ADJUSTED FOR$ALBUMIN) 10 1 mg/dL (calc)  8 6-10 2     (Q) LIPID PANEL WITH REFLEX TO DIRECT LDL [id number] 08:16AM Andrew Welch     Test Name Result Flag Reference   CHOLESTEROL, TOTAL 174 mg/dL  125-200   HDL CHOLESTEROL 54 mg/dL  > OR = 46   TRIGLICERIDES 92 mg/dL  <838   LDL-CHOLESTEROL 102 mg/dL (calc)  <130   Desirable range <100 mg/dL for patients with CHD or  diabetes and <70 mg/dL for diabetic patients with  known heart disease  CHOL/HDLC RATIO 3 2 (calc)  < OR = 5 0   NON HDL CHOLESTEROL 120 mg/dL (calc)     Target for non-HDL cholesterol is 30 mg/dL higher than   LDL cholesterol target       (Q) TSH, 3RD GENERATION W/REFLEX TO FT4 [id number] 08:16AM Andrew Welch     Test Name Result Flag Reference   TSH W/REFLEX TO FT4 2 16 mIU/L  0 40-4 50     (Q) HEMOGLOBIN A1c [id number] 08:16AM Andrew Welch   REPORT COMMENT:  FASTING:YES     Test Name Result Flag Reference   HEMOGLOBIN A1c 6 3 % of total Hgb H <5 7   According to ADA guidelines, hemoglobin A1c <7 0%  represents optimal control in non-pregnant diabetic  patients  Different metrics may apply to specific  patient populations  Standards of Medical Care in  [phone]  Diabetes Care  2013;36:s11-s66     For the purpose of screening for the presence of  diabetes  <5 7%       Consistent with the absence of diabetes  5 7-6 4%    Consistent with increased risk for diabetes              (prediabetes)  >or=6 5%    Consistent with diabetes     This assay result is consistent with a higher risk  of diabetes  Currently, no consensus exists for use of hemoglobin  A1c for diagnosis of diabetes for children  Discussion/Summary   SUGARS ARE BETTER, KIDNEY LEVELS WERE OK   THEY WERE BETTER LAST TIME   DR Nataliia Wood

## 2018-01-15 NOTE — RESULT NOTES
Discussion/Summary   NORMAL MAMMOGRAM   DR DUGGAN [facility]     Verified Results  * MAMMO SCREENING BILATERAL W CAD 12Jun2017 09:37AM Razia Tubbs Order Number: [id number]    - Patient Instructions: To schedule this appointment, please contact Central Scheduling at [phone]  Do not wear any perfume, powder, lotion or deodorant on breast or underarm area  Please bring your doctors order, referral (if needed) and insurance information with you on the day of the test  Failure to bring this information may result in this test being rescheduled  Arrive 15 minutes prior to your appointment time to register  On the day of your test, please bring any prior mammogram or breast studies with you that were not performed at a [facility]  Failure to bring prior exams may result in your test needing to be rescheduled  Test Name Result Flag Reference   MAMMO SCREENING BILATERAL W CAD (Report)     Patient History:   Patient is postmenopausal    Family history of premenopausal breast cancer at age 48 in    sister, colorectal cancer at age 54 in niece  Benign excisional biopsy of the left breast, 1980  Took estrogen for 15 years  Patient is a former smoker  Patient's BMI is 28 3  Reason for exam: screening, asymptomatic  Mammo Screening Bilateral W CAD: June 12, 2017 - Check In #:    [de-identified]   Bilateral MLO and CC view(s) were taken       Technologist: RT Dipesh(R)(M)   Prior study comparison: Zee 10, 2016, mammo screening bilateral    W CAD performed at [address] [place] [address]  June 9, 2015,   bilateral digital screening mammogram performed at [address] [place] [address]  June 6, 2014, bilateral digital screening    mammogram performed at [address] [place] [address]  June 5, 2013, bilateral digital screening mammogram performed at [address] [place] [address]  June 4, 2012, bilateral digital    screening mammogram performed at [address] [place] [address]  Zee 3, 2011, bilateral digital screening mammogram performed at   [address] [place] [address]      The breast tissue is almost entirely fat  No new dominant soft    tissue mass, architectural distortion or suspicious    calcifications are noted  The skin and nipple structures are    within normal limits  Benign appearing calcifications are noted  No mammographic evidence of malignancy  No    significant changes when compared with prior studies  ACR BI-RADSï¾® Assessments: BiRad:2 - Benign     Recommendation:   Routine screening mammogram of both breasts in 1 year  Analyzed by CAD     8-10% of cancers will be missed on mammography  Management of a    palpable abnormality must be based on clinical grounds  Patients   will be notified of their results via letter from our facility  Accredited by [employer] of Radiology and FDA       Transcription Location: [facility] 98: [id number]     Risk Value(s):   Tyrer-Cuzick 10 Year: 2 700%, Tyrer-Cuzick Lifetime: 2 700%,    Myriad Table: 1 5%, RENETTA 5 Year: 3 7%, NCI Lifetime: 5 7%   Signed by:   Katheryn Chavez MD   6/12/17

## 2018-01-15 NOTE — PROGRESS NOTES
Assessment    1  Well adult on routine health check (V70 0) (Z00 00)   2  Visit for screening mammogram (V76 12) (Z12 31)   3  Family history of dementia (V17 2) (Z81 8) : Sister    Plan  CAD in native artery, Hyperlipidemia, Hypertension, Impaired fasting glucose    · (1) HEMOGLOBIN A1C; Status:Active; Requested for:01Sep2016;    · (Q) COMPREHENSIVE METABOLIC PNL W/ADJUSTED CALCIUM; Status:Active; Requested for:01Sep2016;    · (Q) LIPID PANEL WITH REFLEX TO DIRECT LDL; Status:Active; Requested  for:01Sep2016;    · (Q) TSH, 3RD GENERATION W/REFLEX TO FT4; Status:Active; Requested  for:01Sep2016;   Visit for screening mammogram    · * MAMMO SCREENING BILATERAL W CAD; Status:Hold For - Scheduling; Requested  for:23Mar2016;     Discussion/Summary  health maintenance visit Currently, she eats a healthy diet and has an adequate exercise regimen  cervical cancer screening is not indicated Breast cancer screening: the risks and benefits of breast cancer screening were discussed  Colorectal cancer screening: colonoscopy has been ordered  Osteoporosis screening: the risks and benefits of osteoporosis screening were discussed  The immunizations are up to date  Advice and education were given regarding aerobic exercise and vitamin D supplements  Patient discussion: discussed with the patient  Chief Complaint  Patient here for Annual Wellness exam      History of Present Illness  HM, Adult Female: The patient is being seen for a health maintenance evaluation  General Health: The patient's health since the last visit is described as good  She has regular dental visits  She denies vision problems  She denies hearing loss  Immunizations status: up to date  Lifestyle:  She consumes a diverse and healthy diet  She does not have any weight concerns  She exercises regularly  She does not use tobacco  She denies alcohol use  She denies drug use  Reproductive health: the patient is postmenopausal   pregnancy history:  Screening: cancer screening reviewed and updated  Cervical cancer screening includes uncertain timing of her last pap smear  Breast cancer screening includes a mammogram performed last year  Colorectal cancer screening includes a colonoscopy performed within the past ten years  metabolic screening reviewed and updated  Metabolic screening includes lipid profile performed within the past five years, glucose screening performed last year and thyroid function test performed last year  Review of Systems    Constitutional: No fever, no chills, feels well, no tiredness, no recent weight gain or weight loss  Eyes: No complaints of eye pain, no red eyes, no eyesight problems, no discharge, no dry eyes, no itching of eyes  ENT: no complaints of earache, no loss of hearing, no nose bleeds, no nasal discharge, no sore throat, no hoarseness  Cardiovascular: No complaints of slow heart rate, no fast heart rate, no chest pain, no palpitations, no leg claudication, no lower extremity edema  Respiratory: No complaints of shortness of breath, no wheezing, no cough, no SOB on exertion, no orthopnea, no PND  Gastrointestinal: No complaints of abdominal pain, no constipation, no nausea or vomiting, no diarrhea, no bloody stools  Genitourinary: No complaints of dysuria, no incontinence, no pelvic pain, no dysmenorrhea, no vaginal discharge or bleeding  Musculoskeletal: No complaints of arthralgias, no myalgias, no joint swelling or stiffness, no limb pain or swelling  Integumentary: No complaints of skin rash or lesions, no itching, no skin wounds, no breast pain or lump  Neurological: No complaints of headache, no confusion, no convulsions, no numbness, no dizziness or fainting, no tingling, no limb weakness, no difficulty walking  Psychiatric: Not suicidal, no sleep disturbance, no anxiety or depression, no change in personality, no emotional problems     Endocrine: No complaints of proptosis, no hot flashes, no muscle weakness, no deepening of the voice, no feelings of weakness  Hematologic/Lymphatic: No complaints of swollen glands, no swollen glands in the neck, does not bleed easily, does not bruise easily  Active Problems    1  Aneurysm of thoracic aorta (441 2) (I71 2)   2  Aortic aneurysm (441 9) (I71 9)   3  Arteriosclerosis of coronary artery (414 00) (I25 10)   4  CAD in native artery (414 01) (I25 10)   5  Carotid artery stenosis (433 10) (I65 29)   6  Carotid atherosclerosis, unspecified laterality (433 10) (I65 29)   7  Chronic kidney disease, stage 3 (585 3) (N18 3)   8  GERD without esophagitis (530 81) (K21 9)   9  Hyperlipidemia (272 4) (E78 5)   10  Hypertension (401 9) (I10)   11  Hypertensive heart disease (402 90) (I11 9)   12  Impaired fasting glucose (790 21) (R73 01)   13  MeniÃ¨re's disease (386 00) (H81 09)   14  Need for pneumococcal vaccination (V03 82) (Z23)   15  Need for prophylactic vaccination and inoculation against influenza (V04 81) (Z23)   16  Numbness (782 0) (R20 0)   17  Osteoporosis screening (V82 81) (Z13 820)   18  Polyp of sigmoid colon (211 3) (D12 5)   19  Renal Insufficiency (593 9)   20  Shortness of breath (786 05) (R06 02)   21  Special screening for other neurological conditions (V80 09) (Z13 89)   22  Urinary tract infection (599 0) (N39 0)   23   Visit for screening mammogram (V76 12) (Z12 31)    Past Medical History    · History of hematuria (V13 09) (Z87 448)   · Need for prophylactic vaccination and inoculation against influenza (V04 81) (Z23)    Surgical History    · History of Adenoidectomy   · History of Appendectomy   · History of Cataract Surgery   · History of Cervical Vertebral Fusion   · History of Hysterectomy   · History of Paravaginal Defect Graft-Reinforced Repair   · History of Rectocele Repair   · History of Tonsillectomy   · History of Venous Ligation With Stripping    Family History    · Family history of Diabetes Mellitus (V18 0)   · Family history of Glaucoma   · Family history of Hypertension (V17 49)   · Family history of Macular Degeneration    · Family history of Hypertension (V17 49)   · Family history of Stroke Syndrome (V17 1)    · Family history of dementia (V17 2) (Z81 8)   · Family history of Heart Disease (V17 49)    · Family history of Family Health Status 2  Children Living   · Family history of Family Health Status Siblings 6  Living Sisters    Social History    · Denied: History of Alcohol Use (History)   · Daily Coffee Consumption (1  Cups/Day)   · Denied: History of Drug Use   · Former smoker (V15 82) (E06 686)    Current Meds   1  Aspirin 81 MG Oral Tablet; take 1 tab daily; Therapy: (Recorded:25Vgk9977) to Recorded   2  Metoprolol Tartrate 50 MG Oral Tablet; take 2 tablets daily; Therapy: 81UGC6491 to (Evaluate:06Owm6386)  Requested for: 64Efa5915; Last   Rx:02Iqc5999 Ordered   3  Omeprazole 20 MG Oral Capsule Delayed Release; take 1 capsule by mouth once daily; Therapy: 87OSD5976 to (Evaristo Steven)  Requested for: 87VEX6439; Last   DP:59NXP8997 Ordered   4  Triamterene-HCTZ 37 5-25 MG Oral Tablet; take 1 tablet by mouth once daily; Therapy: 30YWY2502 to (Evaluate:23Otw7917)  Requested for: 47LOI7066; Last   Rx:45Yva6120 Ordered   5  Vitamin D TABS; Therapy: (Recorded:09Ysm9374) to Recorded    Allergies    1  atorvastatin    2  No Known Environmental Allergies   3  No Known Food Allergies    Vitals   Recorded: 44VSX1898 09:24AM   Heart Rate 64   Respiration 18   Systolic 890   Diastolic 72   Height 5 ft 3 39 in   Weight 156 lb 2 oz   BMI Calculated 27 32   BSA Calculated 1 75     Physical Exam    Constitutional   General appearance: No acute distress, well appearing and well nourished  Eyes   Conjunctiva and lids: No swelling, erythema or discharge  Pupils and irises: Equal, round, reactive to light      Ears, Nose, Mouth, and Throat   External inspection of ears and nose: Normal     Otoscopic examination: Tympanic membranes translucent with normal light reflex  Canals patent without erythema  Hearing: Normal     Nasal mucosa, septum, and turbinates: Normal without edema or erythema  Lips, teeth, and gums: Normal, good dentition  Oropharynx: Normal with no erythema, edema, exudate or lesions  Neck   Neck: Supple, symmetric, trachea midline, no masses  Thyroid: Normal, no thyromegaly  Pulmonary   Respiratory effort: No increased work of breathing or signs of respiratory distress  Auscultation of lungs: Clear to auscultation  Cardiovascular   Auscultation of heart: Normal rate and rhythm, normal S1 and S2, no murmurs  Examination of extremities for edema and/or varicosities: Normal     Abdomen   Abdomen: Non-tender, no masses  Liver and spleen: No hepatomegaly or splenomegaly  Lymphatic   Palpation of lymph nodes in neck: No lymphadenopathy  Palpation of lymph nodes in axillae: No lymphadenopathy  Musculoskeletal   Gait and station: Normal     Digits and nails: Normal without clubbing or cyanosis  Joints, bones, and muscles: Normal     Range of motion: Normal     Stability: Normal     Muscle strength/tone: Normal     Skin   Skin and subcutaneous tissue: Normal without rashes or lesions  Palpation of skin and subcutaneous tissue: Normal turgor  Neurologic   Cranial nerves: Cranial nerves II-XII intact  Cortical function: Normal mental status  Reflexes: 2+ and symmetric  Sensation: No sensory loss  Coordination: Normal finger to nose and heel to shin  Psychiatric   Judgment and insight: Normal     Orientation to person, place, and time: Normal     Recent and remote memory: Intact      Mood and affect: Normal        Results/Data  PHQ-2 Adult Depression Screening 23Mar2016 09:27AM User, Ahs     Test Name Result Flag Reference   PHQ-2 Adult Depression Score 0     Q1: 0, Q2: 0   PHQ-2 Adult Depression Screening Negative       Falls Risk Assessment (Dx V80 09 Screen for Neurologic Disorder) 63AGT3112 09:27AM User, [facility]     Test Name Result Flag Reference   Falls Risk      No falls in the past year     PHQ-2 Adult Depression Screening 23Mar2016 09:27AM User, "[employer]     Test Name Result Flag Reference   PHQ-2 Adult Depression Score 0     Q1: 0, Q2: 0   PHQ-2 Adult Depression Screening Negative       Falls Risk Assessment (Dx V80 09 Screen for Neurologic Disorder) 21UEY2477 09:27AM User, [facility] [facility]     Test Name Result Flag Reference   Falls Risk      No falls in the past year     (Q) COMPREHENSIVE METABOLIC PNL W/ADJUSTED CALCIUM 01NEB4035 07:04AM Marisel Foster     Test Name Result Flag Reference   GLUCOSE 121 mg/dL H 65-99   Fasting reference interval   UREA NITROGEN (BUN) 28 mg/dL H 7-25   CREATININE 1 55 mg/dL H 0 60-0 93   For patients >52years of age, the reference limit  for Creatinine is approximately 13% higher for people  identified as -American  eGFR NON-AFR  AMERICAN 32 mL/min/1 73m2 L > OR = 60   eGFR AFRICAN AMERICAN 37 mL/min/1 73m2 L > OR = 60   BUN/CREATININE RATIO 18 (calc)  6-22   SODIUM 139 mmol/L  135-146   POTASSIUM 3 8 mmol/L  3 5-5 3   CHLORIDE 98 mmol/L     CARBON DIOXIDE 30 mmol/L  19-30   CALCIUM 9 8 mg/dL  8 6-10 4   CALCIUM (ADJUSTED FOR$ALBUMIN) 9 7 mg/dL (calc)  8 6-10 2   PROTEIN, TOTAL 7 4 g/dL  6 1-8 1   ALBUMIN 4 6 g/dL  3 6-5 1   GLOBULIN 2 8 g/dL (calc)  1 9-3 7   ALBUMIN/GLOBULIN RATIO 1 6 (calc)  1 0-2 5   BILIRUBIN, TOTAL 0 7 mg/dL  0 2-1 2   ALKALINE PHOSPHATASE 82 U/L     AST 28 U/L  10-35   ALT 26 U/L  6-29     (Q) LIPID PANEL WITH REFLEX TO DIRECT LDL 68HPJ2371 07:04AM [place] Foster     Test Name Result Flag Reference   CHOLESTEROL, TOTAL 191 mg/dL  125-200   HDL CHOLESTEROL 55 mg/dL  > OR = 46   TRIGLICERIDES 83 mg/dL  <244   LDL-CHOLESTEROL 119 mg/dL (calc)  <130   Desirable range <100 mg/dL for patients with CHD or  diabetes and <70 mg/dL for diabetic patients with  known heart disease     CHOL/HDLC RATIO 3 5 (calc)  < OR = 5 0   NON HDL CHOLESTEROL 136 mg/dL (calc)     Target for non-HDL cholesterol is 30 mg/dL higher than   LDL cholesterol target  (Q) TSH, 3RD GENERATION W/REFLEX TO FT4 55NOM5105 07:04AM Shalonda Rayund     Test Name Result Flag Reference   TSH W/REFLEX TO FT4 2 53 mIU/L  0 40-4 50     (Q) HEMOGLOBIN A1c 79QDH9570 07:04AM Shalonda Rayund     Test Name Result Flag Reference   HEMOGLOBIN A1c 6 4 % of total Hgb H <5 7   According to ADA guidelines, hemoglobin A1c <7 0%  represents optimal control in non-pregnant diabetic  patients  Different metrics may apply to specific  patient populations  Standards of Medical Care in    Diabetes Care  2013;36:s11-s66     For the purpose of screening for the presence of  diabetes  <5 7%       Consistent with the absence of diabetes  5 7-6 4%    Consistent with increased risk for diabetes              (prediabetes)  >or=6 5%    Consistent with diabetes     This assay result is consistent with a higher risk  of diabetes  Currently, no consensus exists for use of hemoglobin  A1c for diagnosis of diabetes for children         Signatures   Electronically signed by : Tomas Lizarraga DO; Mar 23 2016  9:46AM EST                       (Author)

## 2018-01-16 NOTE — RESULT NOTES
Verified Results  * MAMMO SCREENING BILATERAL W CAD 07CGF8753 09:20AM Tim Hard   TW Order Number: [id number]     Test Name Result Flag Reference   MAMMO SCREENING BILATERAL W CAD (Report)     Patient History:   Patient is postmenopausal    Family history of premenopausal breast cancer in sister at age 48   and colorectal cancer in niece at age 54  Benign excisional biopsy of the left breast, 1980  Took estrogen for 15 years  Patient is a former smoker  Patient's BMI is 28 3  Reason for exam: screening (asymptomatic)  Mammo Screening Bilateral W CAD: Zee 10, 2016 - Check In #:    [de-identified]   Bilateral MLO, CC, and XCCL view(s) were taken  Technologist: RT Bruna(R)(M)   Prior study comparison: June 9, 2015, bilateral digital screening   mammogram performed at [address] [place] [address]  June 6, 2014, bilateral digital screening mammogram performed at [address] [place] [address]      There are scattered fibroglandular densities  The parenchymal pattern appears stable  No dominant soft tissue    mass or suspicious calcifications are noted  The skin and nipple   contours are within normal limits  No mammographic evidence of malignancy  No    significant changes when compared with prior studies  ASSESSMENT: BiRad:1 - Negative     Recommendation:   Routine screening mammogram in 1 year  A reminder letter will be   scheduled  Analyzed by CAD     8-10% of cancers will be missed on mammography  Management of a    palpable abnormality must be based on clinical grounds  Patients   will be notified of their results via letter from our facility  Accredited by [employer] of Radiology and FDA       Transcription Location: [facility]: [id number]     Risk Value(s):   Tyrer-Cuzick 10 Year: 3 211%, Tyrer-Cuzick Lifetime: 3 211%,    Myriad Table: 1 5%, RENETTA 5 Year: 3 8%, NCI Lifetime: 6 3%   Signed by:   Vladimir Bhatt MD   6/10/16 Discussion/Summary   NORMAL MAMMOGRAM      [facility]

## 2018-01-17 NOTE — PROGRESS NOTES
Chief Complaint  Patient presents to office with complaints of urinary retention for the past three days  Active Problems    1  Aneurysm of ascending aorta (441 2) (I71 2)   2  Aortic aneurysm (441 9) (I71 9)   3  Arteriosclerosis of coronary artery (414 00) (I25 10)   4  CAD in native artery (414 01) (I25 10)   5  Carotid artery stenosis (433 10) (I65 29)   6  Carotid atherosclerosis, unspecified laterality (433 10) (I65 29)   7  Chronic kidney disease, stage 3 (585 3) (N18 3)   8  Dysuria (788 1) (R30 0)   9  GERD without esophagitis (530 81) (K21 9)   10  Glaucoma screening (V80 1) (Z13 5)   11  Hyperlipidemia (272 4) (E78 5)   12  Hypertension (401 9) (I10)   13  Hypertensive heart disease (402 90) (I11 9)   14  Impaired fasting glucose (790 21) (R73 01)   15  MeniÃ¨re's disease (386 00) (H81 09)   16  Need for pneumococcal vaccination (V03 82) (Z23)   17  Need for prophylactic vaccination and inoculation against influenza (V04 81) (Z23)   18  Numbness (782 0) (R20 0)   19  Osteoporosis screening (V82 81) (Z13 820)   20  Polyp of sigmoid colon (211 3) (D12 5)   21  Renal Insufficiency (593 9)   22  Shortness of breath (786 05) (R06 02)   23  Special screening for other neurological conditions (V80 09) (Z13 89)   24  Urinary tract infection (599 0) (N39 0)   25  Visit for screening mammogram (V76 12) (Z12 31)   26  Well adult on routine health check (V70 0) (Z00 00)    Current Meds   1  Aspirin 81 MG TABS; take 1 tab daily; Therapy: (Recorded:34Bih3766) to Recorded   2  Ciprofloxacin HCl - 500 MG Oral Tablet; TAKE 1 TABLET EVERY 12 HOURS DAILY; Therapy: 28PFE5820 to (Complete:26Nov2016)  Requested for: 21Nov2016; Last   Rx:21Nov2016 Ordered   3  Metoprolol Tartrate 50 MG Oral Tablet; take 2 tablets daily; Therapy: 39ZIK9637 to (Evaluate:34Qlu5235)  Requested for: 14Pfp9005; Last   Rx:93Jpz9061 Ordered   4  Omeprazole 20 MG Oral Capsule Delayed Release; take 1 capsule by mouth once daily;    Therapy: 26CHE3069 to (Jules Hinton)  Requested for: 55OEN3355; Last   FL:83XJT7520 Ordered   5  Triamterene-HCTZ 37 5-25 MG Oral Tablet; take 1 tablet by mouth once daily; Therapy: 52YDM8052 to (Evaluate:50Fnt4844)  Requested for: 16Aks1465; Last   Rx:94Gao8483 Ordered   6  Vitamin D TABS; Therapy: (Recorded:09Rym9760) to Recorded    Allergies    1  No Known Drug Allergies    2  No Known Environmental Allergies   3   No Known Food Allergies    Results/Data  Urine Dip Non-Automated- POC 52LYR5980 12:00AM Tere Arredondo     Test Name Result Flag Reference   Color Yellow     Clarity Cloudy     Leukocytes large     Nitrite negative     Blood large     Bilirubin negative     Urobilinogen 0 2     Protein 30     Ph 6 0     Specific Gravity 1 015     Ketone negative     Glucose negative         Plan  Urinary tract infection    · Urine Dip Non-Automated- POC; Status:Complete;   Done: 72DIF7381 12:00AM    sent for culture     Future Appointments    Date/Time Provider Specialty Site   03/30/2017 08:00 AM Tere Arredondo DO [facility]   Electronically signed by : Riya Oakley DO; Nov 21 2016  3:56PM EST                       (Author)

## 2018-01-22 VITALS
HEART RATE: 74 BPM | DIASTOLIC BLOOD PRESSURE: 74 MMHG | SYSTOLIC BLOOD PRESSURE: 130 MMHG | HEIGHT: 63 IN | WEIGHT: 153 LBS | BODY MASS INDEX: 27.11 KG/M2

## 2018-01-23 VITALS
SYSTOLIC BLOOD PRESSURE: 100 MMHG | WEIGHT: 155.38 LBS | RESPIRATION RATE: 16 BRPM | HEIGHT: 63 IN | HEART RATE: 60 BPM | BODY MASS INDEX: 27.53 KG/M2 | DIASTOLIC BLOOD PRESSURE: 60 MMHG

## 2018-01-24 NOTE — PROGRESS NOTES
Assessment    1  Well adult on routine health check (V70 0) (Z00 00)   2  Visit for screening mammogram (V76 12) (Z12 31)   3  Family history of dementia (V17 2) (Z81 8) : Sister    Plan  CAD in native artery, Hyperlipidemia, Hypertension, Impaired fasting glucose    · (1) HEMOGLOBIN A1C; Status:Active; Requested for:01Sep2016;    · (Q) COMPREHENSIVE METABOLIC PNL W/ADJUSTED CALCIUM; Status:Active; Requested for:01Sep2016;    · (Q) LIPID PANEL WITH REFLEX TO DIRECT LDL; Status:Active; Requested  for:01Sep2016;    · (Q) TSH, 3RD GENERATION W/REFLEX TO FT4; Status:Active; Requested  for:01Sep2016;   Visit for screening mammogram    · * MAMMO SCREENING BILATERAL W CAD; Status:Hold For - Scheduling; Requested  for:23Mar2016;   Well adult on routine health check    · Follow-up visit in 1 year Evaluation and Treatment  Follow-up  Status: Hold For -  Scheduling  Requested for: 58YQO8923   · Eat a low fat and low cholesterol diet ; Status:Complete;   Done: 11HGC2391 09:52AM   · There are many exercise options for seniors ; Status:Complete;   Done: 15MRN6255  09:52AM   · Use a sun block product with an SPF of 15 or more ; Status:Complete;   Done:  51XOG5998 09:52AM   · We encourage you to begin to make lifestyle changes to help control your blood  pressure    These may include losing weight, increasing your activity level, limiting salt in  your diet, decreasing alcohol intake, and eating a diet low in fat and rich in fruits  and vegetables ; Status:Complete;   Done: 30FIN7115 09:52AM   · We recommend routine visits to a dentist ; Status:Complete;   Done: 89SLV3914 09:52AM   · Call [phone] if: You find a new or different kind of lump in your breast ;  Status:Complete;   Done: 61QWU9659 09:52AM   · Call [phone] if: You have any bleeding from the vagina ; Status:Complete;    Done: 09AIJ3996 09:52AM   · Call [phone] if: You have any warning signs of skin cancer ; Status:Complete;    Done: 97TPR6629 09: 52AM    Discussion/Summary  Impression: Subsequent Annual Wellness Visit, with preventive exam as well as age and risk appropriate counseling completed  Cardiovascular screening and counseling: screening is current  Diabetes screening and counseling: screening is current  Colorectal cancer screening and counseling: screening is current  Breast cancer screening and counseling: screening is current  Cervical cancer screening and counseling: screening is current  Osteoporosis screening and counseling: screening is current  Abdominal aortic aneurysm screening and counseling: screening is current  Glaucoma screening and counseling: screening is current  HIV screening and counseling: screening not indicated  Advance Directive Planning: paperwork and instructions were given to the patient  Advice and education were given regarding increasing physical activity  She was referred to cardiology  Patient Discussion: plan discussed with the patient, follow-up visit needed in one year  Chief Complaint  Patient here for Medicare Wellness exam      History of Present Illness  Welcome to Medicare and Wellness Visits: The patient is being seen for the subsequent annual wellness visit  Medicare Screening and Risk Factors   Hospitalizations: no previous hospitalizations  Once per lifetime medicare screening tests: ECG  Medicare Screening Tests Risk Questions   Osteoporosis risk assessment: none indicated  HIV risk assessment: none indicated  Drug and Alcohol Use: The patient has never smoked cigarettes  The patient reports never drinking alcohol  She has never used illicit drugs  Diet and Physical Activity: Current diet includes well balanced meals  She exercises daily  Exercise: walking  Mood Disorder and Cognitive Impairment Screening: She denies feeling down, depressed, or hopeless over the past two weeks   She denies feeling little interest or pleasure in doing things over the past two weeks    Cognitive impairment screening: denies difficulty learning/retaining new information, denies difficulty handling complex tasks, denies difficulty with reasoning, denies difficulty with spatial ability and orientation, denies difficulty with language and denies difficulty with behavior  Functional Ability/Level of Safety: Hearing is normal bilaterally  The patient is currently able to do activities of daily living without limitations  Activities of daily living details: does not need help using the phone, no transportation help needed, does not need help shopping, no meal preparation help needed, does not need help doing housework, does not need help doing laundry, does not need help managing medications and does not need help managing money  Fall risk factors:  antihypertensive use, but no polypharmacy, no alcohol use, no mobility impairment, no antidepressant use, no deconditioning, no postural hypotension, no sedative use, no visual impairment, no urinary incontinence, no cognitive impairment, up and go test was normal and no previous fall  Home safety risk factors:  no unfamiliar surroundings, no loose rugs, no poor household lighting, no uneven floors, no household clutter, grab bars in the bathroom and handrails on the stairs  Advance Directives: Advance directives: living will, durable power of  for health care directives and advance directives  end of life decisions were reviewed with the patient  Co-Managers and Medical Equipment/Suppliers: See Patient Care Team      Patient Care Team    Care Team Member Role Specialty Office Number   [address] [facility] [phone]   Margret Rangel MD  Cardiology [phone]   [facility]  Cardiac Surgery [phone]   [facility]  Urology [phone]   Micha Patricio MD  Colon and Rectal Surgery [phone]   Deandre DARBY    Nephrology [phone], [address] [facility] [phone]     Review of Systems    Constitutional: negative  Head and Face: negative  Eyes: negative  ENT: negative  Cardiovascular: negative  Respiratory: negative  Gastrointestinal: negative  Genitourinary: negative  Musculoskeletal: negative  Integumentary and Breasts: negative  Neurological: negative  Psychiatric: negative  Endocrine: negative  Hematologic and Lymphatic: negative  Active Problems    1  Aneurysm of thoracic aorta (441 2) (I71 2)   2  Aortic aneurysm (441 9) (I71 9)   3  Arteriosclerosis of coronary artery (414 00) (I25 10)   4  CAD in native artery (414 01) (I25 10)   5  Carotid artery stenosis (433 10) (I65 29)   6  Carotid atherosclerosis, unspecified laterality (433 10) (I65 29)   7  Chronic kidney disease, stage 3 (585 3) (N18 3)   8  GERD without esophagitis (530 81) (K21 9)   9  Hyperlipidemia (272 4) (E78 5)   10  Hypertension (401 9) (I10)   11  Hypertensive heart disease (402 90) (I11 9)   12  Impaired fasting glucose (790 21) (R73 01)   13  MeniÃ¨re's disease (386 00) (H81 09)   14  Need for pneumococcal vaccination (V03 82) (Z23)   15  Need for prophylactic vaccination and inoculation against influenza (V04 81) (Z23)   16  Numbness (782 0) (R20 0)   17  Osteoporosis screening (V82 81) (Z13 820)   18  Polyp of sigmoid colon (211 3) (D12 5)   19  Renal Insufficiency (593 9)   20  Shortness of breath (786 05) (R06 02)   21  Special screening for other neurological conditions (V80 09) (Z13 89)   22  Urinary tract infection (599 0) (N39 0)   23   Visit for screening mammogram (V76 12) (Z12 31)    Past Medical History    · History of hematuria (V13 09) (Z87 448)   · Need for prophylactic vaccination and inoculation against influenza (V04 81) (Z23)    Surgical History    · History of Adenoidectomy   · History of Appendectomy   · History of Cataract Surgery   · History of Cervical Vertebral Fusion   · History of Hysterectomy   · History of Paravaginal Defect Graft-Reinforced Repair   · History of Rectocele Repair   · History of Tonsillectomy   · History of Venous Ligation With Stripping    Family History    · Family history of Diabetes Mellitus (V18 0)   · Family history of Glaucoma   · Family history of Hypertension (V17 49)   · Family history of Macular Degeneration    · Family history of Hypertension (V17 49)   · Family history of Stroke Syndrome (V17 1)    · Family history of Heart Disease (V17 49)    · Family history of Family Health Status 2  Children Living   · Family history of Family Health Status Siblings 6  Living Sisters    Social History    · Denied: History of Alcohol Use (History)   · Daily Coffee Consumption (1  Cups/Day)   · Denied: History of Drug Use   · Former smoker (V15 82) (Q02 030)    Current Meds   1  Aspirin 81 MG Oral Tablet; take 1 tab daily; Therapy: (Recorded:73Foj2073) to Recorded   2  Metoprolol Tartrate 50 MG Oral Tablet; take 2 tablets daily; Therapy: 41UYZ4285 to (Evaluate:87Smc7808)  Requested for: 60Ekz5863; Last   Rx:91Efj9898 Ordered   3  Omeprazole 20 MG Oral Capsule Delayed Release; take 1 capsule by mouth once daily; Therapy: 45AEK6560 to (Javier Acosta)  Requested for: 48BOD7434; Last   DQ:03UBQ6266 Ordered   4  Triamterene-HCTZ 37 5-25 MG Oral Tablet; take 1 tablet by mouth once daily; Therapy: 29HRM8829 to (Evaluate:88Abz7901)  Requested for: 55CWM2036; Last   Rx:22Jun2015 Ordered   5  Vitamin D TABS; Therapy: (Recorded:78Oov7472) to Recorded    Allergies    1  atorvastatin    2  No Known Environmental Allergies   3   No Known Food Allergies    Immunizations   1 2 3 4    Influenza  2012 91Nyw7290 71Zve8544 68SNN9733    Pneumococcal  2012 47WOE0529      Zoster  2013        Vitals  Signs [Data Includes: Current Encounter]    Heart Rate: 64  Respiration: 18  Systolic: 634  Diastolic: 72  Height: 5 ft 3 39 in  Weight: 156 lb 2 oz  BMI Calculated: 27 32  BSA Calculated: 1 75    Physical Exam    Constitutional General appearance: No acute distress, well appearing and well nourished  Head and Face   Head and face: Normal     Eyes   Conjunctiva and lids: No swelling, erythema or discharge  Pupils and irises: Equal, round, reactive to light  Ears, Nose, Mouth, and Throat   External inspection of ears and nose: Normal     Otoscopic examination: Tympanic membranes translucent with normal light reflex  Canals patent without erythema  Hearing: Normal     Nasal mucosa, septum, and turbinates: Normal without edema or erythema  Lips, teeth, and gums: Normal, good dentition  Oropharynx: Normal with no erythema, edema, exudate or lesions  Neck   Neck: Supple, symmetric, trachea midline, no masses  Thyroid: Normal, no thyromegaly  Pulmonary   Respiratory effort: No increased work of breathing or signs of respiratory distress  Auscultation of lungs: Clear to auscultation  Cardiovascular   Auscultation of heart: Normal rate and rhythm, normal S1 and S2, no murmurs  Examination of extremities for edema and/or varicosities: Normal     Abdomen   Abdomen: Non-tender, no masses  Liver and spleen: No hepatomegaly or splenomegaly  Lymphatic   Palpation of lymph nodes in neck: No lymphadenopathy  Palpation of lymph nodes in axillae: No lymphadenopathy  Musculoskeletal   Gait and station: Normal     Digits and nails: Normal without clubbing or cyanosis  Joints, bones, and muscles: Normal     Range of motion: Normal     Stability: Normal     Muscle strength/tone: Normal     Skin   Skin and subcutaneous tissue: Normal without rashes or lesions  Palpation of skin and subcutaneous tissue: Normal turgor  Neurologic   Cranial nerves: Cranial nerves II-XII intact  Cortical function: Normal mental status  Reflexes: 2+ and symmetric  Sensation: No sensory loss  Coordination: Normal finger to nose and heel to shin      Psychiatric   Judgment and insight: Normal     Orientation to person, place, and time: Normal     Recent and remote memory: Intact      Mood and affect: Normal        Results/Data  PHQ-2 Adult Depression Screening 23Mar2016 09:27AM User, [facility]     Test Name Result Flag Reference   PHQ-2 Adult Depression Score 0     Q1: 0, Q2: 0   PHQ-2 Adult Depression Screening Negative       Falls Risk Assessment (Dx V80 09 Screen for Neurologic Disorder) 90ODJ7512 09:27AM User, [employer]     Test Name Result Flag Reference   Falls Risk      No falls in the past year       Signatures   Electronically signed by : Cori Oreilly DO; Mar 23 2016  9:54AM EST                       (Author)

## 2018-02-19 ENCOUNTER — HOSPITAL ENCOUNTER (OUTPATIENT)
Dept: RADIOLOGY | Facility: HOSPITAL | Age: 83
Discharge: HOME | End: 2018-02-19
Attending: ORTHOPAEDIC SURGERY
Payer: COMMERCIAL

## 2018-02-19 ENCOUNTER — OFFICE VISIT (OUTPATIENT)
Dept: OBGYN CLINIC | Facility: HOSPITAL | Age: 83
End: 2018-02-19
Payer: COMMERCIAL

## 2018-02-19 VITALS
SYSTOLIC BLOOD PRESSURE: 102 MMHG | HEART RATE: 69 BPM | HEIGHT: 64 IN | BODY MASS INDEX: 26.55 KG/M2 | WEIGHT: 155.5 LBS | DIASTOLIC BLOOD PRESSURE: 72 MMHG

## 2018-02-19 DIAGNOSIS — M75.41 SHOULDER IMPINGEMENT SYNDROME, RIGHT: Primary | ICD-10-CM

## 2018-02-19 PROCEDURE — 20610 DRAIN/INJ JOINT/BURSA W/O US: CPT | Performed by: ORTHOPAEDIC SURGERY

## 2018-02-19 PROCEDURE — 73030 X-RAY EXAM OF SHOULDER: CPT

## 2018-02-19 PROCEDURE — 99203 OFFICE O/P NEW LOW 30 MIN: CPT | Performed by: ORTHOPAEDIC SURGERY

## 2018-02-19 RX ORDER — ERYTHROMYCIN 5 MG/G
OINTMENT OPHTHALMIC
Refills: 0 | COMMUNITY
Start: 2017-12-12 | End: 2018-04-05 | Stop reason: ALTCHOICE

## 2018-02-19 RX ORDER — BUPIVACAINE HYDROCHLORIDE 2.5 MG/ML
2 INJECTION, SOLUTION INFILTRATION; PERINEURAL
Status: COMPLETED | OUTPATIENT
Start: 2018-02-19 | End: 2018-02-19

## 2018-02-19 RX ORDER — BETAMETHASONE SODIUM PHOSPHATE AND BETAMETHASONE ACETATE 3; 3 MG/ML; MG/ML
6 INJECTION, SUSPENSION INTRA-ARTICULAR; INTRALESIONAL; INTRAMUSCULAR; SOFT TISSUE
Status: COMPLETED | OUTPATIENT
Start: 2018-02-19 | End: 2018-02-19

## 2018-02-19 RX ORDER — CYCLOBENZAPRINE HCL 5 MG
TABLET ORAL
Refills: 0 | COMMUNITY
Start: 2017-12-04 | End: 2018-04-05 | Stop reason: ALTCHOICE

## 2018-02-19 RX ADMIN — BUPIVACAINE HYDROCHLORIDE 2 ML: 2.5 INJECTION, SOLUTION INFILTRATION; PERINEURAL at 13:43

## 2018-02-19 RX ADMIN — BETAMETHASONE SODIUM PHOSPHATE AND BETAMETHASONE ACETATE 6 MG: 3; 3 INJECTION, SUSPENSION INTRA-ARTICULAR; INTRALESIONAL; INTRAMUSCULAR; SOFT TISSUE at 13:43

## 2018-02-19 NOTE — PATIENT INSTRUCTIONS

## 2018-02-19 NOTE — PROGRESS NOTES
Assessment/Plan:  Assessment/Plan   Diagnoses and all orders for this visit:    Shoulder impingement syndrome, right  -     XR shoulder 2+ vw right      - Cortisone injection administered today to right shoulder subacromial joint for symptom relief  - PT script for rotator cuff strengthening and improved range of motion provided   - f/u 2 months or on as needed basis should symptoms improve      Subjective:   Patient ID: Shruthi Guerra is a 81 y o  female  The patient presents with a chief complaint of right shoulder pain  The pain began 6 month(s) ago and is not associated with an acute injury  The patient describes the pain as aching, 7 out of 10 in intensity,  intermittent in timing, and localizes the pain to the  right subacromial joint  The pain is worse with overhead work and relieved by rest   The pain is not associated with numbness and tingling  The pain is not associated with constitutional symptoms  The patient is not awoken at night by the pain  The patient has had no treatment  The following portions of the patient's history were reviewed and updated as appropriate: allergies, current medications, past family history, past medical history, past social history, past surgical history and problem list     Review of Systems   Constitutional: Negative  Negative for chills and fever  HENT: Negative  Respiratory: Negative  Negative for shortness of breath and wheezing  Cardiovascular: Negative  Negative for chest pain and palpitations  Gastrointestinal: Negative  Negative for diarrhea, nausea and vomiting  Endocrine: Negative  Genitourinary: Negative  Skin: Negative  Neurological: Negative  Hematological: Negative  Psychiatric/Behavioral: Negative  Objective:  Left Shoulder Exam     Tenderness   The patient is experiencing no tenderness           Range of Motion   Active Abduction: 90   Passive Abduction: normal   Forward Flexion: 120   External Rotation: normal     Muscle Strength   Abduction: 5/5   External Rotation: 5/5     Tests   Cross Arm: negative  Drop Arm: negative  Hawkin's test: positive  Impingement: positive    Other   Erythema: absent  Sensation: normal  Pulse: present               Physical Exam   Constitutional: She is oriented to person, place, and time  She appears well-developed and well-nourished  HENT:   Head: Normocephalic and atraumatic  Neck: Normal range of motion  Cardiovascular: Normal rate and intact distal pulses  Pulmonary/Chest: Effort normal  She has no wheezes  Abdominal: Soft  She exhibits no distension  Neurological: She is alert and oriented to person, place, and time  Skin: Skin is warm and dry  Psychiatric: She has a normal mood and affect  I have personally reviewed pertinent films in PACS and my interpretation is as follows     XR R shoulder shows well located joint, mild arthritic changes, no fracture    Large joint arthrocentesis  Date/Time: 2/19/2018 1:43 PM  Consent given by: patient  Timeout: Immediately prior to procedure a time out was called to verify the correct patient, procedure, equipment, support staff and site/side marked as required   Supporting Documentation  Indications: pain   Procedure Details  Location: shoulder - R subacromial bursa  Preparation: Patient was prepped and draped in the usual sterile fashion  Needle size: 22 G  Ultrasound guidance: no  Approach: lateral  Medications administered: 2 mL bupivacaine 0 25 %; 6 mg betamethasone acetate-betamethasone sodium phosphate 6 (3-3) mg/mL    Patient tolerance: patient tolerated the procedure well with no immediate complications  Dressing:  Sterile dressing applied

## 2018-02-22 ENCOUNTER — EVALUATION (OUTPATIENT)
Dept: PHYSICAL THERAPY | Facility: REHABILITATION | Age: 83
End: 2018-02-22
Payer: COMMERCIAL

## 2018-02-22 DIAGNOSIS — M75.41 IMPINGEMENT SYNDROME OF RIGHT SHOULDER: Primary | ICD-10-CM

## 2018-02-22 PROCEDURE — G8984 CARRY CURRENT STATUS: HCPCS | Performed by: PHYSICAL THERAPIST

## 2018-02-22 PROCEDURE — G8985 CARRY GOAL STATUS: HCPCS | Performed by: PHYSICAL THERAPIST

## 2018-02-22 PROCEDURE — 97110 THERAPEUTIC EXERCISES: CPT | Performed by: PHYSICAL THERAPIST

## 2018-02-22 PROCEDURE — 97161 PT EVAL LOW COMPLEX 20 MIN: CPT | Performed by: PHYSICAL THERAPIST

## 2018-02-22 NOTE — PROGRESS NOTES
PT Evaluation     Today's date: 2018  Patient name: Pamella Andres  : 1935  MRN: [id number]  Referring provider: Brit Lacey MD  Dx:   Encounter Diagnosis     ICD-10-CM    1  Impingement syndrome of right shoulder M75 41        Start Time: 1200  Stop Time: 1300  Total time in clinic (min): 60 minutes    Assessment  Impairments: abnormal coordination, abnormal muscle firing, abnormal or restricted ROM, activity intolerance, impaired physical strength, lacks appropriate home exercise program, pain with function and scapular dyskinesis    Assessment details: Pamella Andres is a 81 y o  female who presents with pain, decreased strength, decreased ROM, decreased joint mobility and postural  dysfunction  Due to these impairments, Patient has difficulty performing a/iadls and engaging in social activities  Patient's clinical presentation is consistent with their referring diagnosis of right shoulder impingement syndrome  Patient would benefit from skilled physical therapy to address their aforementioned impairments, improve their level of function and to improve their overall quality of life  Understanding of Dx/Px/POC: excellent  Goals  Short Term Goals: to be achieved by 4 weeks  1) Patient to be independent with basic HEP  2) Decrease pain to 3/10 at it's worst   3) Increase UE strength by 1/2 MMT grade in all deficient planes  4) Increase UE ROM by > 5 deg in all deficient planes  5) Patient to report decreased sleep interruption secondary to pain  Long Term Goals: to be achieved by discharge  1) FOTO equal to or greater than 62   2) Patient to be independent with comprehensive HEP  3) Abolish pain for improved quality of life  4) Increase UE strength to 5/5 MMT grade in all deficient planes to improve a/iadls  5) Increase UE ROM to within 5 deg of contralateral UE to improve a/iadls  6) Patient to report no sleep interruption secondary to pain      Plan  Patient would benefit from: skilled PT  Planned modality interventions: biofeedback, cryotherapy, hydrotherapy, unattended electrical stimulation and thermotherapy: hydrocollator packs  Planned therapy interventions: activity modification, ADL retraining, behavior modification, body mechanics training, functional ROM exercises, home exercise program, IADL retraining, joint mobilization, manual therapy, massage, neuromuscular re-education, patient education, postural training, strengthening, stretching, therapeutic activities and therapeutic exercise  Frequency: 2-3x week  Duration in weeks: 12  Treatment plan discussed with: patient and family        Subjective Evaluation    History of Present Illness  Onset date: 6-7 mo  ago  Mechanism of injury: Patient reports that she went to reach overhead into her cabinets when she noticed right shoulder pain  Patient's pain has been progressively worsening since then  Patient denies experiencing tingling/numbness, crepitus or instability  Patient's PMH is significant for a cervical fusion approximately 30 years ago which addressed severe right UE pain and weakness  Following her symptoms returned to normal  Patient has lifting restrictions of nothing greater than 10 lbs d/t having an aortic aneurysm  Patient's next f/u appointment with her referring physician is in approximately 5 weeks  Not a recurrent problem Pain  Current pain ratin  At best pain ratin  At worst pain ratin  Location: right lateral deltoid, superior shoulder  Quality: dull ache and sharp  Alleviating factors: Advil, injection, rest   Exacerbated by: lifting, reaching overhead, reaching away from body, laying on right shoulder, sleeping    Progression: improved    Hand dominance: right      Diagnostic Tests  X-ray: normal  Treatments  Previous treatment: injection treatment  Patient Goals  Patient goals for therapy: decreased pain, increased motion, independence with ADLs/IADLs and increased strength          Objective Active Range of Motion   Left Shoulder   Flexion: 165 degrees   Abduction: 155 degrees   External rotation BTH: T3   Internal rotation BTB: T7     Right Shoulder   Flexion: 135 degrees   Abduction: 124 degrees   External rotation BTH: C7   Internal rotation BTB: L1     Additional Active Range of Motion Details  Cervical AROM grossly limited approximately 50% in all planes without reproduction of symptoms    Passive Range of Motion   Left Shoulder   Flexion: 175 degrees   Abduction: 180 degrees   External rotation 0°: 95 degrees   Internal rotation 0°: 53 degrees     Right Shoulder   Flexion: 155 degrees   Abduction: 143 degrees   External rotation 0°: 80 degrees   Internal rotation 0°: 30 degrees     Joint Play   Left Shoulder  Joints within functional limits are the anterior capsule, posterior capsule and inferior capsule  Right Shoulder  Joints within functional limits are the anterior capsule, posterior capsule and inferior capsule       Strength/Myotome Testing     Left Shoulder     Planes of Motion   Flexion: 5   Abduction: 4+   External rotation at 0°: 5   Internal rotation at 0°: 5     Isolated Muscles   Lower trapezius: 4   Middle deltoid: 4     Right Shoulder     Planes of Motion   Flexion: 5   Abduction: 4+   External rotation at 0°: 5   Internal rotation at 0°: 5     Isolated Muscles   Lower trapezius: 4-   Middle deltoid: 4-     Left Elbow   Flexion: 5  Extension: 5    Right Elbow   Flexion: 5  Extension: 5    Left Wrist/Hand   Wrist extension: 5  Wrist flexion: 5    Right Wrist/Hand   Wrist extension: 5  Wrist flexion: 5      Flowsheet Rows    Flowsheet Row Most Recent Value   PT/OT G-Codes   Current Score  45   Projected Score  62   FOTO information reviewed  Yes   Assessment Type  Evaluation   G code set  Carrying, Moving & Handling Objects   Carrying, Moving and Handling Objects Current Status ()  CK   Carrying, Moving and Handling Objects Goal Status ()  CI          Precautions: Cardiac, aortic aneurysm, h/o cervical fusion    Daily Treatment Diary     Manual  2/22            Right shoulder PROM             Right GHJ inferior, AP Grd II-IV mobs                                                        Exercise Diary  2/22            UBE             Pulleys             Shoulder extension             IR str  With strap             ER in s/l             IR with TB             ER with TB             Prone row             Corner str                                                                                                                                                                  Modalities

## 2018-02-26 ENCOUNTER — OFFICE VISIT (OUTPATIENT)
Dept: PHYSICAL THERAPY | Facility: REHABILITATION | Age: 83
End: 2018-02-26
Payer: COMMERCIAL

## 2018-02-26 DIAGNOSIS — M75.41 IMPINGEMENT SYNDROME OF RIGHT SHOULDER: Primary | ICD-10-CM

## 2018-02-26 PROCEDURE — 97110 THERAPEUTIC EXERCISES: CPT | Performed by: PHYSICAL THERAPIST

## 2018-02-26 PROCEDURE — 97140 MANUAL THERAPY 1/> REGIONS: CPT | Performed by: PHYSICAL THERAPIST

## 2018-02-26 NOTE — PROGRESS NOTES
Daily Note     Today's date: 2018  Patient name: Raina Pacheco  : 1935  MRN: [id number]  Referring provider: Victor Manuel Guidry MD  Dx:   Encounter Diagnosis     ICD-10-CM    1  Impingement syndrome of right shoulder M75 41        Start Time: 1030  Stop Time: 1120  Total time in clinic (min): 50 minutes    Subjective: Patient reports that she has been performing her HEP without complications  Patient enters today's treatment session with minimal discomfort in her shoulder  Objective: See treatment diary below      Assessment: Tolerated treatment well  Patient demonstrated fatigue post treatment and would benefit from continued PT  Patient with improved right shoulder ROM in all planes with minimal discomfort into end range flexion and abduction  Patient with intermittent muscular guarding  Plan: Continue per plan of care  Progress treatment as tolerated  Precautions:  Cardiac, aortic aneurysm, h/o cervical fusion    Daily Treatment Diary     Manual             Right shoulder PROM  GR           Right GHJ inferior, AP Grd II-IV mobs  GR                                                      Exercise Diary             UBE  3'/3'           Pulleys  5'           Shoulder extension  3x10 OTB           IR str  With strap  20x5"           ER in s/l  3x10 2#           IR with TB  2x10 OTB           ER with TB  2x10 YTB           Prone row             Corner str    3x30"                                                                                                                                                              Modalities

## 2018-02-28 ENCOUNTER — OFFICE VISIT (OUTPATIENT)
Dept: PHYSICAL THERAPY | Facility: REHABILITATION | Age: 83
End: 2018-02-28
Payer: COMMERCIAL

## 2018-02-28 DIAGNOSIS — M75.41 IMPINGEMENT SYNDROME OF RIGHT SHOULDER: Primary | ICD-10-CM

## 2018-02-28 PROCEDURE — 97110 THERAPEUTIC EXERCISES: CPT | Performed by: PHYSICAL THERAPIST

## 2018-02-28 PROCEDURE — 97140 MANUAL THERAPY 1/> REGIONS: CPT | Performed by: PHYSICAL THERAPIST

## 2018-02-28 NOTE — PROGRESS NOTES
Daily Note     Today's date: 2018  Patient name: Ashlie Whaley  : 1935  MRN: [id number]  Referring provider: Wes Whitfield MD  Dx:   Encounter Diagnosis     ICD-10-CM    1  Impingement syndrome of right shoulder M75 41        Start Time: 1355  Stop Time: 1450  Total time in clinic (min): 55 minutes    Subjective: Patient reports that she has no current pain  Patient had mild soreness following her previous treatment session  Objective: See treatment diary below      Assessment: Tolerated treatment well  Patient demonstrated fatigue post treatment, exhibited good technique with therapeutic exercises and would benefit from continued PT  Patient with improved tolerance for there ex with no reproduction of symptoms  Plan: Continue per plan of care  Progress treatment as tolerated  Precautions:  Cardiac, aortic aneurysm, h/o cervical fusion    Daily Treatment Diary     Manual            Right shoulder PROM  GR GR          Right GHJ inferior, AP Grd II-IV mobs  GR GR                                                     Exercise Diary            UBE  3'/3' 3'/3'          Pulleys  5' 5'          Shoulder extension  3x10 OTB 3x10 OTB          IR str  With strap  20x5"           ER in s/l  3x10 2# 3x10 2#          IR with TB  2x10 OTB 3x10 OTB          ER with TB  2x10 YTB 3x10 YTB          Prone row             Corner str    3x30" 5x20"                                                                                                                                                             Modalities

## 2018-03-05 ENCOUNTER — OFFICE VISIT (OUTPATIENT)
Dept: PHYSICAL THERAPY | Facility: REHABILITATION | Age: 83
End: 2018-03-05
Payer: COMMERCIAL

## 2018-03-05 DIAGNOSIS — M75.41 IMPINGEMENT SYNDROME OF RIGHT SHOULDER: Primary | ICD-10-CM

## 2018-03-05 PROCEDURE — 97140 MANUAL THERAPY 1/> REGIONS: CPT | Performed by: PHYSICAL THERAPY ASSISTANT

## 2018-03-05 PROCEDURE — 97112 NEUROMUSCULAR REEDUCATION: CPT | Performed by: PHYSICAL THERAPY ASSISTANT

## 2018-03-07 ENCOUNTER — APPOINTMENT (OUTPATIENT)
Dept: PHYSICAL THERAPY | Facility: REHABILITATION | Age: 83
End: 2018-03-07
Payer: COMMERCIAL

## 2018-03-12 ENCOUNTER — OFFICE VISIT (OUTPATIENT)
Dept: PHYSICAL THERAPY | Facility: REHABILITATION | Age: 83
End: 2018-03-12
Payer: COMMERCIAL

## 2018-03-12 DIAGNOSIS — M75.41 IMPINGEMENT SYNDROME OF RIGHT SHOULDER: Primary | ICD-10-CM

## 2018-03-12 PROCEDURE — 97110 THERAPEUTIC EXERCISES: CPT | Performed by: PHYSICAL THERAPIST

## 2018-03-12 PROCEDURE — 97140 MANUAL THERAPY 1/> REGIONS: CPT | Performed by: PHYSICAL THERAPIST

## 2018-03-12 NOTE — PROGRESS NOTES
Daily Note     Today's date: 3/12/2018  Patient name: Cali Hernandez  : 1935  MRN: [id number]  Referring provider: Francie Ash MD  Dx:   Encounter Diagnosis     ICD-10-CM    1  Impingement syndrome of right shoulder M75 41        Start Time: 1145  Stop Time: 1240  Total time in clinic (min): 55 minutes    Subjective: Patient reports that she is able to reach over her head with greater ease  Objective: See treatment diary below      Assessment: Tolerated treatment well  Patient demonstrated fatigue post treatment, exhibited good technique with therapeutic exercises and would benefit from continued PT  Patient overall demonstrating improved independence and understanding of current treatment program, but does require intermittent verbal and visual cueing to perform correctly  Patient continues to have discomfort when reaching behind back, but is demonstrating improved quality of motion  Plan: Continue per plan of care  Progress treatment as tolerated  Precautions:  Cardiac, aortic aneurysm, h/o cervical fusion    Daily Treatment Diary     Manual  2/22 2/26 2/28 3/5 3/12        Right shoulder PROM  GR GR RK         Right GHJ inferior, AP Grd II-IV mobs  GR GR                                                     Exercise Diary  2/22 2/26 2/28 3/5 3/12        UBE  3'/3' 3'/3' 3'/3' 4'/4'        Pulleys  5' 5' 5' 5'        Shoulder extension  3x10 OTB 3x10 OTB 3x10  otb 3x10 GTB        IR str  With strap  20x5"  30"  x5 20x5"        ER in s/l  3x10 2# 3x10 2# 3x10  2#         IR with TB  2x10 OTB 3x10 OTB 3x10  otb 3x10 OTB        ER with TB  2x10 YTB 3x10 YTB 3x10  ytb 3x10 OTB        Prone row             Corner str    3x30" 5x20" 5x20" 5x30"        Serratus wall slides     30x YTB                                                                                                                                              Modalities

## 2018-03-16 ENCOUNTER — OFFICE VISIT (OUTPATIENT)
Dept: PHYSICAL THERAPY | Facility: REHABILITATION | Age: 83
End: 2018-03-16
Payer: COMMERCIAL

## 2018-03-16 DIAGNOSIS — M75.41 IMPINGEMENT SYNDROME OF RIGHT SHOULDER: Primary | ICD-10-CM

## 2018-03-16 PROCEDURE — 97110 THERAPEUTIC EXERCISES: CPT

## 2018-03-16 NOTE — PROGRESS NOTES
Daily Note     Today's date: 3/16/2018  Patient name: Jyoti Salgado  : 1935  MRN: [id number]  Referring provider: Ene Iglesias MD  Dx:   Encounter Diagnosis     ICD-10-CM    1  Impingement syndrome of right shoulder M75 41                   Subjective: Patient reports that she is having no pain currently and is able to move her arm easier in different directions  Objective: See treatment diary below      Assessment: Tolerated treatment well  Patient demonstrated fatigue post treatment, exhibited good technique with therapeutic exercises and would benefit from continued PT  Pt demonstrated good quality motion with all TE performed but did need Vcing for proper dosage  Plan: Continue per plan of care  Progress treatment as tolerated  Precautions:  Cardiac, aortic aneurysm, h/o cervical fusion    Daily Treatment Diary     Manual  2/22 2/26 2/28 3/5 3/12 3/16       Right shoulder PROM  GR GR RK         Right GHJ inferior, AP Grd II-IV mobs  GR GR                                                     Exercise Diary  2/22 2/26 2/28 3/5 3/12 3/16       UBE  3'/3' 3'/3' 3'/3' 4'/4' 4'/4'       Pulleys  5' 5' 5' 5' 5'       Shoulder extension  3x10 OTB 3x10 OTB 3x10  otb 3x10 GTB 3x10 GTB       IR str  With strap  20x5"  30"  x5 20x5" 20"x5       ER in s/l  3x10 2# 3x10 2# 3x10  2#         IR with TB  2x10 OTB 3x10 OTB 3x10  otb 3x10 OTB 3x10 OTB       ER with TB  2x10 YTB 3x10 YTB 3x10  ytb 3x10 OTB 3x10 OTB       Prone row             Corner str    3x30" 5x20" 5x20" 5x30" 5x30"       Serratus wall slides     30x YTB 30x YTB                                                                                                                                             Modalities

## 2018-03-21 ENCOUNTER — APPOINTMENT (OUTPATIENT)
Dept: PHYSICAL THERAPY | Facility: REHABILITATION | Age: 83
End: 2018-03-21
Payer: COMMERCIAL

## 2018-03-23 ENCOUNTER — OFFICE VISIT (OUTPATIENT)
Dept: PHYSICAL THERAPY | Facility: REHABILITATION | Age: 83
End: 2018-03-23
Payer: COMMERCIAL

## 2018-03-23 DIAGNOSIS — M75.41 IMPINGEMENT SYNDROME OF RIGHT SHOULDER: Primary | ICD-10-CM

## 2018-03-23 PROCEDURE — 97110 THERAPEUTIC EXERCISES: CPT | Performed by: PHYSICAL THERAPIST

## 2018-03-23 PROCEDURE — G8986 CARRY D/C STATUS: HCPCS | Performed by: PHYSICAL THERAPIST

## 2018-03-23 PROCEDURE — 97140 MANUAL THERAPY 1/> REGIONS: CPT | Performed by: PHYSICAL THERAPIST

## 2018-03-23 PROCEDURE — G8985 CARRY GOAL STATUS: HCPCS | Performed by: PHYSICAL THERAPIST

## 2018-03-23 NOTE — PROGRESS NOTES
PT Re-Evaluation  and PT Discharge    Today's date: 3/23/2018  Patient name: Raina Pacheco  : 1935  MRN: [id number]  Referring provider: Victor Manuel Guidry MD  Dx:   Encounter Diagnosis     ICD-10-CM    1  Impingement syndrome of right shoulder M75 41        Start Time: 945  Stop Time: 1020  Total time in clinic (min): 35 minutes    Assessment    Assessment details: Since beginning physical therapy, Saravanan oVra has attended a total number of 7 visits and has maintained excellent compliance with established POC  Patient has made significant improvements in all areas, including decreased pain, increased strength, increased ROM, improved flexibility, improved joint mobility, improved postural awareness and improved overall level of function  Patient is reporting improved ability to perform a/iadls, recreational activities, work-related activities and engaging in social activities  Patient is independent with comprehensive HEP  Patient has been instructed to contact PT if she begins to notice a decline in function or has any questions or concerns  Patient will be discharged at this time  Patient is in agreement with plan  Understanding of Dx/Px/POC: excellent  Goals  Short Term Goals: to be achieved by 4 weeks ALL GOALS MET  1) Patient to be independent with basic HEP  2) Decrease pain to 3/10 at it's worst   3) Increase UE strength by 1/2 MMT grade in all deficient planes  4) Increase UE ROM by > 5 deg in all deficient planes  5) Patient to report decreased sleep interruption secondary to pain  Long Term Goals: to be achieved by discharge ALL GOALS MET  1) FOTO equal to or greater than 62   2) Patient to be independent with comprehensive HEP  3) Abolish pain for improved quality of life  4) Increase UE strength to 5/5 MMT grade in all deficient planes to improve a/iadls  5) Increase UE ROM to within 5 deg of contralateral UE to improve a/iadls    6) Patient to report no sleep interruption secondary to pain     Plan  Planned therapy interventions: home exercise program  Treatment plan discussed with: patient        Subjective Evaluation    History of Present Illness  Mechanism of injury: Patient reports that her right shoulder has returned to 100% of her premorbid mobility  Patient is reporting abolished pain and no limitation with her shoulder  Pain  No pain reported    Treatments  Current treatment: physical therapy  Patient Goals  Patient goals for therapy: increased strength, independence with ADLs/IADLs, return to sport/leisure activities, return to work, increased motion and decreased pain          Objective     Active Range of Motion     Right Shoulder   Normal active range of motion  Flexion: 150 degrees   Abduction: 150 degrees   External rotation BTH: T3   Internal rotation BTB: T11     Passive Range of Motion     Right Shoulder   Flexion: 160 degrees   Abduction: 170 degrees   External rotation 90°: 90 degrees   Internal rotation 90°: 45 degrees     Joint Play     Right Shoulder  Joints within functional limits are the anterior capsule, posterior capsule and inferior capsule       Strength/Myotome Testing     Right Shoulder     Planes of Motion   Flexion: 5   Abduction: 5   External rotation at 0°: 5   Internal rotation at 0°: 5     Isolated Muscles   Lower trapezius: 5   Middle trapezius: 5     Right Elbow   Flexion: 5  Extension: 5      Flowsheet Rows    Flowsheet Row Most Recent Value   PT/OT G-Codes   Current Score  64   Projected Score  62   FOTO information reviewed  Yes   Assessment Type  Discharge   G code set  Carrying, Moving & Handling Objects   Carrying, Moving and Handling Objects Goal Status ()  [facility]   Carrying, Moving and Handling Objects Discharge Status ()  [facility]          Precautions:  Cardiac, aortic aneurysm, h/o cervical fusion    Daily Treatment Diary     Manual  2/22 2/26 2/28 3/5 3/12 3/16 3/23      Right shoulder PROM  GR GR RK         Right GHJ inferior, AP Grd II-IV mobs GR GR          Reassessmenet       GR                                    Exercise Diary  2/22 2/26 2/28 3/5 3/12 3/16 3/23      UBE  3'/3' 3'/3' 3'/3' 4'/4' 4'/4' 5'      Pulleys  5' 5' 5' 5' 5'       Shoulder extension  3x10 OTB 3x10 OTB 3x10  otb 3x10 GTB 3x10 GTB HEP      IR str  With strap  20x5"  30"  x5 20x5" 20"x5 HEP      ER in s/l  3x10 2# 3x10 2# 3x10  2#         IR with TB  2x10 OTB 3x10 OTB 3x10  otb 3x10 OTB 3x10 OTB HEP      ER with TB  2x10 YTB 3x10 YTB 3x10  ytb 3x10 OTB 3x10 OTB HEP      Prone row             Corner str    3x30" 5x20" 5x20" 5x30" 5x30" HEP      Serratus wall slides     30x YTB 30x YTB HEP                                                                                                                                            Modalities

## 2018-03-28 ENCOUNTER — APPOINTMENT (OUTPATIENT)
Dept: PHYSICAL THERAPY | Facility: REHABILITATION | Age: 83
End: 2018-03-28
Payer: COMMERCIAL

## 2018-03-30 ENCOUNTER — APPOINTMENT (OUTPATIENT)
Dept: PHYSICAL THERAPY | Facility: REHABILITATION | Age: 83
End: 2018-03-30
Payer: COMMERCIAL

## 2018-04-04 LAB
ALBUMIN SERPL-MCNC: 4.3 G/DL (ref 3.6–5.1)
ALBUMIN/GLOB SERPL: 1.5 (CALC) (ref 1–2.5)
ALP SERPL-CCNC: 89 U/L (ref 33–130)
ALT SERPL-CCNC: 32 U/L (ref 6–29)
AST SERPL-CCNC: 29 U/L (ref 10–35)
BILIRUB SERPL-MCNC: 0.6 MG/DL (ref 0.2–1.2)
BUN SERPL-MCNC: 24 MG/DL (ref 7–25)
BUN/CREAT SERPL: 17 (CALC) (ref 6–22)
CALCIUM ALBUM COR SERPL-MCNC: 9.9 MG/DL (CALC) (ref 8.6–10.2)
CALCIUM SERPL-MCNC: 9.8 MG/DL (ref 8.6–10.4)
CHLORIDE SERPL-SCNC: 100 MMOL/L (ref 98–110)
CHOLEST SERPL-MCNC: 180 MG/DL
CHOLEST/HDLC SERPL: 3.1 (CALC)
CO2 SERPL-SCNC: 32 MMOL/L (ref 20–31)
CREAT SERPL-MCNC: 1.4 MG/DL (ref 0.6–0.88)
GLOBULIN SER CALC-MCNC: 2.9 G/DL (CALC) (ref 1.9–3.7)
GLUCOSE SERPL-MCNC: 135 MG/DL (ref 65–99)
HBA1C MFR BLD: 6.2 % OF TOTAL HGB
HDLC SERPL-MCNC: 58 MG/DL
LDLC SERPL CALC-MCNC: 103 MG/DL (CALC)
NONHDLC SERPL-MCNC: 122 MG/DL (CALC)
POTASSIUM SERPL-SCNC: 3.8 MMOL/L (ref 3.5–5.3)
PROT SERPL-MCNC: 7.2 G/DL (ref 6.1–8.1)
SL AMB EGFR AFRICAN AMERICAN: 41 ML/MIN/1.73M2
SL AMB EGFR NON AFRICAN AMERICAN: 35 ML/MIN/1.73M2
SODIUM SERPL-SCNC: 141 MMOL/L (ref 135–146)
TRIGL SERPL-MCNC: 96 MG/DL
TSH SERPL-ACNC: 2.21 MIU/L (ref 0.4–4.5)

## 2018-04-05 ENCOUNTER — OFFICE VISIT (OUTPATIENT)
Dept: CARDIOLOGY CLINIC | Facility: CLINIC | Age: 83
End: 2018-04-05
Payer: COMMERCIAL

## 2018-04-05 VITALS
HEART RATE: 64 BPM | SYSTOLIC BLOOD PRESSURE: 144 MMHG | HEIGHT: 64 IN | BODY MASS INDEX: 26.29 KG/M2 | WEIGHT: 154 LBS | DIASTOLIC BLOOD PRESSURE: 86 MMHG

## 2018-04-05 DIAGNOSIS — I10 ESSENTIAL HYPERTENSION: ICD-10-CM

## 2018-04-05 DIAGNOSIS — I25.10 CORONARY ARTERY DISEASE INVOLVING NATIVE CORONARY ARTERY OF NATIVE HEART WITHOUT ANGINA PECTORIS: Primary | ICD-10-CM

## 2018-04-05 DIAGNOSIS — I71.2 THORACIC AORTIC ANEURYSM WITHOUT RUPTURE (HCC): ICD-10-CM

## 2018-04-05 DIAGNOSIS — I65.22 CAROTID ARTERY OBSTRUCTION, LEFT: ICD-10-CM

## 2018-04-05 PROBLEM — I71.20 THORACIC AORTIC ANEURYSM WITHOUT RUPTURE: Status: ACTIVE | Noted: 2018-04-05

## 2018-04-05 PROCEDURE — 99214 OFFICE O/P EST MOD 30 MIN: CPT | Performed by: INTERNAL MEDICINE

## 2018-04-05 NOTE — PROGRESS NOTES
Cardiology Follow Up    Bairon Pemberton  1935  [id number]  [facility]    1  Coronary artery disease involving native coronary artery of native heart without angina pectoris     2  Thoracic aortic aneurysm without rupture ([facility] )     3  Essential hypertension     4  Carotid artery obstruction, left         Interval History:  She denies chest pain shortness of breath orthopnea paroxysmal nocturnal dyspnea or syncope  She did note denies focal motor weakness  Patient Active Problem List   Diagnosis    Shoulder impingement syndrome, right    Coronary artery disease involving native coronary artery of native heart without angina pectoris    Thoracic aortic aneurysm without rupture ([facility] )    Essential hypertension    Carotid artery obstruction, left     Past Medical History:   Diagnosis Date    Aorta aneurysm ([facility] )     Cardiac disease     GERD (gastroesophageal reflux disease)     Hematuria     last assessed: 10/28/2013    Shortness of breath     last assessed: 6/27/2013     Social History     Social History    Marital status: /Civil Union     Spouse name: N/A    Number of children: N/A    Years of education: N/A     Occupational History    Not on file       Social History Main Topics    Smoking status: Former Smoker    Smokeless tobacco: Never Used    Alcohol use No    Drug use: No    Sexual activity: Not on file     Other Topics Concern    Not on file     Social History Narrative    Daily coffee consumption (1 cups/day)      Family History   Problem Relation Age of Onset    Diabetes Mother      mellitus    Glaucoma Mother     Hypertension Mother     Macular degeneration Mother     Stroke Father      syndrome    Hypertension Father     Cancer Sister     Dementia Sister     Heart disease Sister      Past Surgical History:   Procedure Laterality Date  ADENOIDECTOMY      APPENDECTOMY      CATARACT EXTRACTION      CERVICAL FUSION      HYSTERECTOMY      OTHER SURGICAL HISTORY      paravaginal defect graft-reinforced repair    REPAIR RECTOCELE      TONSILLECTOMY      VEIN LIGATION AND STRIPPING Bilateral        Current Outpatient Prescriptions:     aspirin 81 MG tablet, Take by mouth, Disp: , Rfl:     metoprolol tartrate (LOPRESSOR) 50 mg tablet, Take by mouth, Disp: , Rfl:     omeprazole (PriLOSEC) 20 mg delayed release capsule, Take 20 mg by mouth daily, Disp: , Rfl:     triamterene-hydrochlorothiazide (MAXZIDE-25) 37 5-25 mg per tablet, Take by mouth, Disp: , Rfl:   No Known Allergies    Labs:  Orders Only on 04/03/2018   Component Date Value    Total Cholesterol 04/03/2018 180     SL AMB HDL CHOLESTEROL 04/03/2018 58     Triglycerides 04/03/2018 96     SL AMB LDL-CHOLESTEROL 04/03/2018 103*    SL AMB CHOL/HDLC RATIO 04/03/2018 3 1     SL AMB NON HDL CHOLESTER* 04/03/2018 122     SL AMB GLUCOSE 04/03/2018 135*    BUN 04/03/2018 24     Creatinine, Serum 04/03/2018 1 40*    eGFR Non African American 04/03/2018 35*    SL AMB EGFR  AMER* 04/03/2018 41*    SL AMB BUN/CREATININE RA* 04/03/2018 17     SL AMB SODIUM 04/03/2018 141     SL AMB POTASSIUM 04/03/2018 3 8     SL AMB CHLORIDE 04/03/2018 100     SL AMB CARBON DIOXIDE 04/03/2018 32*    SL AMB CALCIUM 04/03/2018 9 8     Calcium (Adjusted for Al* 04/03/2018 9 9     SL AMB PROTEIN, TOTAL 04/03/2018 7 2     Serum Albumin 04/03/2018 4 3     SL AMB GLOBULIN 04/03/2018 2 9     SL AMB ALBUMIN/GLOBULIN * 04/03/2018 1 5     SL AMB BILIRUBIN, TOTAL 04/03/2018 0 6     SL AMB ALKALINE PHOSPHAT* 04/03/2018 89     SL AMB AST 04/03/2018 29     SL AMB ALT 04/03/2018 32*    SL AMB TSH W/ REFLEX TO * 04/03/2018 2 21     Hemoglobin A1C 04/03/2018 6 2*     Imaging: No results found  Review of Systems:  Review of Systems   Constitutional: Negative for fatigue     HENT: Negative for hearing loss  Eyes: Negative for discharge  Respiratory: Negative for shortness of breath  Cardiovascular: Negative for chest pain, palpitations and leg swelling  Gastrointestinal: Negative for abdominal pain  Endocrine: Negative for polyuria  Genitourinary: Negative for dysuria  Musculoskeletal: Negative for back pain and myalgias  Skin: Negative for rash  Neurological: Negative for syncope  Hematological: Does not bruise/bleed easily  Psychiatric/Behavioral: Negative for confusion and sleep disturbance  Physical Exam:  Physical Exam   Constitutional: She is oriented to person, place, and time  She appears well-developed and well-nourished  HENT:   Head: Normocephalic and atraumatic  Mouth/Throat: Oropharynx is clear and moist    Eyes: EOM are normal    Neck: Normal range of motion  Neck supple  No JVD present  Cardiovascular: Normal rate, regular rhythm, normal heart sounds and intact distal pulses  Pulses:       Carotid pulses are on the right side with bruit, and on the left side with bruit  Pulmonary/Chest: Effort normal and breath sounds normal    Abdominal: Soft  Bowel sounds are normal    Musculoskeletal: Normal range of motion  Neurological: She is alert and oriented to person, place, and time  She has normal reflexes  Skin: Skin is warm and dry  Psychiatric: She has a normal mood and affect  Her behavior is normal  Judgment and thought content normal    Nursing note and vitals reviewed  Discussion/Summary:  She continues asymptomatic in functional class 1  Remote history of coronary intervention  Noted aortic aneurysm disease with an aorta that is 5 centimeters in 2016  She does follow up in aortic Clinic and will be due for another CT at a maximum of 2 years  She has a known 50-69 percent left carotid obstruction  She continues on aspirin as anti-platelet therapy beta blockade and titrate hydrochlorothiazide  Blood pressure is borderline today  This will continue to be monitored  She is not tolerant of statins  Most recent LDL was 103  She requests to be seen on a yearly basis  I told her as long as she follows up with her primary physician regularly that is acceptable  She will call with any issues

## 2018-04-10 ENCOUNTER — OFFICE VISIT (OUTPATIENT)
Dept: FAMILY MEDICINE CLINIC | Facility: CLINIC | Age: 83
End: 2018-04-10
Payer: COMMERCIAL

## 2018-04-10 VITALS
HEART RATE: 60 BPM | RESPIRATION RATE: 16 BRPM | SYSTOLIC BLOOD PRESSURE: 110 MMHG | DIASTOLIC BLOOD PRESSURE: 70 MMHG | WEIGHT: 152.6 LBS | HEIGHT: 64 IN | BODY MASS INDEX: 26.05 KG/M2

## 2018-04-10 DIAGNOSIS — I10 ESSENTIAL HYPERTENSION: Primary | ICD-10-CM

## 2018-04-10 DIAGNOSIS — E78.5 HYPERLIPIDEMIA, UNSPECIFIED HYPERLIPIDEMIA TYPE: ICD-10-CM

## 2018-04-10 DIAGNOSIS — Z12.31 VISIT FOR SCREENING MAMMOGRAM: ICD-10-CM

## 2018-04-10 DIAGNOSIS — N18.30 CHRONIC KIDNEY DISEASE, STAGE 3 (HCC): ICD-10-CM

## 2018-04-10 DIAGNOSIS — R73.01 IMPAIRED FASTING GLUCOSE: ICD-10-CM

## 2018-04-10 PROCEDURE — 3074F SYST BP LT 130 MM HG: CPT | Performed by: FAMILY MEDICINE

## 2018-04-10 PROCEDURE — 3078F DIAST BP <80 MM HG: CPT | Performed by: FAMILY MEDICINE

## 2018-04-10 PROCEDURE — 99214 OFFICE O/P EST MOD 30 MIN: CPT | Performed by: FAMILY MEDICINE

## 2018-04-10 NOTE — PROGRESS NOTES
Assessment/Plan:    Chronic kidney disease, stage 3  Avoid nephrotoxic meds  Has seen nephrology in past  Drink more water    Hyperlipidemia  Cholesterol was good  Recheck in 6 months    Impaired fasting glucose  Sugar up slightly - will resume walking  Recheck in 6 months         Problem List Items Addressed This Visit     Hypertension - Primary    Chronic kidney disease, stage 3     Avoid nephrotoxic meds  Has seen nephrology in past  Drink more water         Hyperlipidemia     Cholesterol was good  Recheck in 6 months         Impaired fasting glucose     Sugar up slightly - will resume walking  Recheck in 6 months                 Subjective:      Patient ID: Radha Brice is a 81 y o  female  Here for follow up  Seen by Dr Cruz Schafer- released for 1 year  Following CT surgery for aneursyn      Hypertension   This is a chronic problem  The current episode started more than 1 year ago  The problem is unchanged  The problem is controlled  Pertinent negatives include no anxiety, blurred vision, chest pain, headaches, malaise/fatigue, neck pain, orthopnea, palpitations, peripheral edema, PND, shortness of breath or sweats  There are no associated agents to hypertension  Risk factors for coronary artery disease include dyslipidemia  Past treatments include beta blockers and diuretics  The current treatment provides significant improvement  Identifiable causes of hypertension include chronic renal disease  Hyperlipidemia   This is a chronic problem  The current episode started more than 1 year ago  The problem is controlled  Exacerbating diseases include chronic renal disease  There are no known factors aggravating her hyperlipidemia  Pertinent negatives include no chest pain or shortness of breath  Current antihyperlipidemic treatment includes statins         The following portions of the patient's history were reviewed and updated as appropriate: allergies, current medications, past family history, past medical history, past social history, past surgical history and problem list     Review of Systems   Constitutional: Negative  Negative for malaise/fatigue  HENT: Negative  Eyes: Negative  Negative for blurred vision  Respiratory: Negative for shortness of breath  Cardiovascular: Negative for chest pain, palpitations, orthopnea and PND  Endocrine: Negative  Genitourinary: Negative  Musculoskeletal: Negative for neck pain  Allergic/Immunologic: Negative  Neurological: Negative for headaches  Hematological: Negative  Psychiatric/Behavioral: Negative          Recent Results (from the past 672 hour(s))   Lipid Panel with Direct LDL reflex    Collection Time: 04/03/18  7:39 AM   Result Value Ref Range    Total Cholesterol 180 <200 mg/dL    SL AMB HDL CHOLESTEROL 58 >50 mg/dL    Triglycerides 96 <150 mg/dL    SL AMB LDL-CHOLESTEROL 103 (H) mg/dL (calc)    SL AMB CHOL/HDLC RATIO 3 1 <5 0 (calc)    SL AMB NON HDL CHOLESTEROL 122 <130 mg/dL (calc)   Comprehensive Metabolic Pnl W/Adjusted Calcium    Collection Time: 04/03/18  7:39 AM   Result Value Ref Range    SL AMB GLUCOSE 135 (H) 65 - 99 mg/dL    BUN 24 7 - 25 mg/dL    Creatinine, Serum 1 40 (H) 0 60 - 0 88 mg/dL    eGFR Non  35 (L) > OR = 60 mL/min/1 73m2    SL AMB EGFR  41 (L) > OR = 60 mL/min/1 73m2    SL AMB BUN/CREATININE RATIO 17 6 - 22 (calc)    SL AMB SODIUM 141 135 - 146 mmol/L    SL AMB POTASSIUM 3 8 3 5 - 5 3 mmol/L    SL AMB CHLORIDE 100 98 - 110 mmol/L    SL AMB CARBON DIOXIDE 32 (H) 20 - 31 mmol/L    SL AMB CALCIUM 9 8 8 6 - 10 4 mg/dL    Calcium (Adjusted for Albumin 9 9 8 6 - 10 2 mg/dL (calc)    SL AMB PROTEIN, TOTAL 7 2 6 1 - 8 1 g/dL    Serum Albumin 4 3 3 6 - 5 1 g/dL    SL AMB GLOBULIN 2 9 1 9 - 3 7 g/dL (calc)    SL AMB ALBUMIN/GLOBULIN RATIO 1 5 1 0 - 2 5 (calc)    SL AMB BILIRUBIN, TOTAL 0 6 0 2 - 1 2 mg/dL    SL AMB ALKALINE PHOSPHATASE 89 33 - 130 U/L    SL AMB AST 29 10 - 35 U/L    SL AMB ALT 32 (H) 6 - 29 U/L   TSH, 3rd generation with T4 reflex    Collection Time: 04/03/18  7:39 AM   Result Value Ref Range    SL AMB TSH W/ REFLEX TO FREE T4 2 21 0 40 - 4 50 mIU/L   Hemoglobin A1c    Collection Time: 04/03/18  7:39 AM   Result Value Ref Range    Hemoglobin A1C 6 2 (H) <5 7 % of total Hgb       Objective:      /70   Pulse 60   Resp 16   Ht 5' 4" (1 626 m)   Wt 69 2 kg (152 lb 9 6 oz)   BMI 26 19 kg/m²          Physical Exam   Constitutional: She appears well-developed and well-nourished  HENT:   Head: Normocephalic  Eyes: EOM are normal  Pupils are equal, round, and reactive to light  Neck: Normal range of motion  Neck supple  Cardiovascular: Normal rate, regular rhythm, normal heart sounds and intact distal pulses  Pulmonary/Chest: Effort normal and breath sounds normal    Abdominal: Soft  Bowel sounds are normal    Musculoskeletal: Normal range of motion  Neurological: She is alert  Skin: Skin is warm and dry  Psychiatric: She has a normal mood and affect  Her behavior is normal  Judgment and thought content normal    Nursing note and vitals reviewed

## 2018-06-13 ENCOUNTER — HOSPITAL ENCOUNTER (OUTPATIENT)
Dept: RADIOLOGY | Facility: HOSPITAL | Age: 83
Discharge: HOME | End: 2018-06-13
Payer: COMMERCIAL

## 2018-06-13 DIAGNOSIS — Z12.31 VISIT FOR SCREENING MAMMOGRAM: ICD-10-CM

## 2018-06-13 PROCEDURE — 77067 SCR MAMMO BI INCL CAD: CPT

## 2018-07-02 ENCOUNTER — OFFICE VISIT (OUTPATIENT)
Dept: FAMILY MEDICINE CLINIC | Facility: CLINIC | Age: 83
End: 2018-07-02
Payer: COMMERCIAL

## 2018-07-02 VITALS
BODY MASS INDEX: 25.44 KG/M2 | HEART RATE: 80 BPM | WEIGHT: 149 LBS | DIASTOLIC BLOOD PRESSURE: 80 MMHG | SYSTOLIC BLOOD PRESSURE: 110 MMHG | OXYGEN SATURATION: 95 % | TEMPERATURE: 98.4 F | RESPIRATION RATE: 16 BRPM | HEIGHT: 64 IN

## 2018-07-02 DIAGNOSIS — J40 BRONCHITIS: Primary | ICD-10-CM

## 2018-07-02 PROCEDURE — 99213 OFFICE O/P EST LOW 20 MIN: CPT | Performed by: FAMILY MEDICINE

## 2018-07-02 RX ORDER — AZITHROMYCIN 250 MG/1
TABLET, FILM COATED ORAL
Qty: 6 TABLET | Refills: 0 | Status: SHIPPED | OUTPATIENT
Start: 2018-07-02 | End: 2018-07-06

## 2018-07-02 NOTE — PATIENT INSTRUCTIONS
Acute Bronchitis   WHAT YOU NEED TO KNOW:   Acute bronchitis is swelling and irritation in the air passages of your lungs  This irritation may cause you to cough or have other breathing problems  Acute bronchitis often starts because of another illness, such as a cold or the flu  The illness spreads from your nose and throat to your windpipe and airways  Bronchitis is often called a chest cold  Acute bronchitis lasts about 3 to 6 weeks and is usually not a serious illness  Your cough can last for several weeks  DISCHARGE INSTRUCTIONS:   Return to the emergency department if:   · You cough up blood  · Your lips or fingernails turn blue  · You feel like you are not getting enough air when you breathe  Contact your healthcare provider if:   · You have a fever  · Your breathing problems do not go away or get worse  · Your cough does not get better within 4 weeks  · You have questions or concerns about your condition or care  Self-care:   · Get more rest   Rest helps your body to heal  Slowly start to do more each day  Rest when you feel it is needed  · Avoid irritants in the air  Avoid chemicals, fumes, and dust  Wear a face mask if you must work around dust or fumes  Stay inside on days when air pollution levels are high  If you have allergies, stay inside when pollen counts are high  Do not use aerosol products, such as spray-on deodorant, bug spray, and hair spray  · Do not smoke or be around others who smoke  Nicotine and other chemicals in cigarettes and cigars damages the cilia that move mucus out of your lungs  Ask your healthcare provider for information if you currently smoke and need help to quit  E-cigarettes or smokeless tobacco still contain nicotine  Talk to your healthcare provider before you use these products  · Drink liquids as directed  Liquids help keep your air passages moist and help you cough up mucus   You may need to drink more liquids when you have acute bronchitis  Ask how much liquid to drink each day and which liquids are best for you  · Use a humidifier or vaporizer  Use a cool mist humidifier or a vaporizer to increase air moisture in your home  This may make it easier for you to breathe and help decrease your cough  Decrease risk for acute bronchitis:   · Get the vaccinations you need  Ask your healthcare provider if you should get vaccinated against the flu or pneumonia  · Prevent the spread of germs  You can decrease your risk of acute bronchitis and other illnesses by doing the following:     [facility] your hands often with soap and water  Carry germ-killing hand lotion or gel with you  You can use the lotion or gel to clean your hands when soap and water are not available  ¨ Do not touch your eyes, nose, or mouth unless you have washed your hands first     ¨ Always cover your mouth when you cough to prevent the spread of germs  It is best to cough into a tissue or your shirt sleeve instead of into your hand  Ask those around you cover their mouths when they cough  ¨ Try to avoid people who have a cold or the flu  If you are sick, stay away from others as much as possible  Medicines: Your healthcare provider may  give you any of the following:  · Ibuprofen or acetaminophen  are medicines that help lower your fever  They are available without a doctor's order  Ask your healthcare provider which medicine is right for you  Ask how much to take and how often to take it  Follow directions  These medicines can cause stomach bleeding if not taken correctly  Ibuprofen can cause kidney damage  Do not take ibuprofen if you have kidney disease, an ulcer, or allergies to aspirin  Acetaminophen can cause liver damage  Do not take more than 4,000 milligrams in 24 hours  · Decongestants  help loosen mucus in your lungs and make it easier to cough up  This can help you breathe easier  · Cough suppressants  decrease your urge to cough   If your cough produces mucus, do not take a cough suppressant unless your healthcare provider tells you to  Your healthcare provider may suggest that you take a cough suppressant at night so you can rest     · Inhalers  may be given  Your healthcare provider may give you one or more inhalers to help you breathe easier and cough less  An inhaler gives your medicine to open your airways  Ask your healthcare provider to show you how to use your inhaler correctly  · Take your medicine as directed  Contact your healthcare provider if you think your medicine is not helping or if you have side effects  Tell him of her if you are allergic to any medicine  Keep a list of the medicines, vitamins, and herbs you take  Include the amounts, and when and why you take them  Bring the list or the pill bottles to follow-up visits  Carry your medicine list with you in case of an emergency  Follow up with your healthcare provider as directed:  Write down questions you have so you will remember to ask them during your follow-up visits  © 2017 [employer] Information is for End User's use only and may not be sold, redistributed or otherwise used for commercial purposes  All illustrations and images included in CareNotes® are the copyrighted property of [employer]  or [employer]  The above information is an  only  It is not intended as medical advice for individual conditions or treatments  Talk to your doctor, nurse or pharmacist before following any medical regimen to see if it is safe and effective for you

## 2018-07-02 NOTE — PROGRESS NOTES
Assessment/Plan:    Problem List Items Addressed This Visit     Bronchitis - Primary    Relevant Medications    azithromycin (ZITHROMAX) 250 mg tablet          Patient Instructions     Acute Bronchitis   WHAT YOU NEED TO KNOW:   Acute bronchitis is swelling and irritation in the air passages of your lungs  This irritation may cause you to cough or have other breathing problems  Acute bronchitis often starts because of another illness, such as a cold or the flu  The illness spreads from your nose and throat to your windpipe and airways  Bronchitis is often called a chest cold  Acute bronchitis lasts about 3 to 6 weeks and is usually not a serious illness  Your cough can last for several weeks  DISCHARGE INSTRUCTIONS:   Return to the emergency department if:   · You cough up blood  · Your lips or fingernails turn blue  · You feel like you are not getting enough air when you breathe  Contact your healthcare provider if:   · You have a fever  · Your breathing problems do not go away or get worse  · Your cough does not get better within 4 weeks  · You have questions or concerns about your condition or care  Self-care:   · Get more rest   Rest helps your body to heal  Slowly start to do more each day  Rest when you feel it is needed  · Avoid irritants in the air  Avoid chemicals, fumes, and dust  Wear a face mask if you must work around dust or fumes  Stay inside on days when air pollution levels are high  If you have allergies, stay inside when pollen counts are high  Do not use aerosol products, such as spray-on deodorant, bug spray, and hair spray  · Do not smoke or be around others who smoke  Nicotine and other chemicals in cigarettes and cigars damages the cilia that move mucus out of your lungs  Ask your healthcare provider for information if you currently smoke and need help to quit  E-cigarettes or smokeless tobacco still contain nicotine   Talk to your healthcare provider before you use these products  · Drink liquids as directed  Liquids help keep your air passages moist and help you cough up mucus  You may need to drink more liquids when you have acute bronchitis  Ask how much liquid to drink each day and which liquids are best for you  · Use a humidifier or vaporizer  Use a cool mist humidifier or a vaporizer to increase air moisture in your home  This may make it easier for you to breathe and help decrease your cough  Decrease risk for acute bronchitis:   · Get the vaccinations you need  Ask your healthcare provider if you should get vaccinated against the flu or pneumonia  · Prevent the spread of germs  You can decrease your risk of acute bronchitis and other illnesses by doing the following:     [facility] [place] your hands often with soap and water  Carry germ-killing hand lotion or gel with you  You can use the lotion or gel to clean your hands when soap and water are not available  ¨ Do not touch your eyes, nose, or mouth unless you have washed your hands first     ¨ Always cover your mouth when you cough to prevent the spread of germs  It is best to cough into a tissue or your shirt sleeve instead of into your hand  Ask those around you cover their mouths when they cough  ¨ Try to avoid people who have a cold or the flu  If you are sick, stay away from others as much as possible  Medicines: Your healthcare provider may  give you any of the following:  · Ibuprofen or acetaminophen  are medicines that help lower your fever  They are available without a doctor's order  Ask your healthcare provider which medicine is right for you  Ask how much to take and how often to take it  Follow directions  These medicines can cause stomach bleeding if not taken correctly  Ibuprofen can cause kidney damage  Do not take ibuprofen if you have kidney disease, an ulcer, or allergies to aspirin  Acetaminophen can cause liver damage  Do not take more than 4,000 milligrams in 24 hours  · Decongestants  help loosen mucus in your lungs and make it easier to cough up  This can help you breathe easier  · Cough suppressants  decrease your urge to cough  If your cough produces mucus, do not take a cough suppressant unless your healthcare provider tells you to  Your healthcare provider may suggest that you take a cough suppressant at night so you can rest     · Inhalers  may be given  Your healthcare provider may give you one or more inhalers to help you breathe easier and cough less  An inhaler gives your medicine to open your airways  Ask your healthcare provider to show you how to use your inhaler correctly  · Take your medicine as directed  Contact your healthcare provider if you think your medicine is not helping or if you have side effects  Tell him of her if you are allergic to any medicine  Keep a list of the medicines, vitamins, and herbs you take  Include the amounts, and when and why you take them  Bring the list or the pill bottles to follow-up visits  Carry your medicine list with you in case of an emergency  Follow up with your healthcare provider as directed:  Write down questions you have so you will remember to ask them during your follow-up visits  © 2017 [employer]  Information is for End User's use only and may not be sold, redistributed or otherwise used for commercial purposes  All illustrations and images included in CareNotes® are the copyrighted property of [employer]  or [employer]  The above information is an  only  It is not intended as medical advice for individual conditions or treatments  Talk to your doctor, nurse or pharmacist before following any medical regimen to see if it is safe and effective for you  Return if symptoms worsen or fail to improve  Subjective:      Patient ID: Saman Olivo is a 81 y o  female      Chief Complaint   Patient presents with   Ronnald Saliva Like Symptoms     Patient here with cough bringing up mucous today a little dryer post nasal drip wheeze     Headache       Sunday started with vomiting and diarrhea for 2 days, then resolved  Started Wednesday with cold symptoms   was sick prior with same symptoms      URI    This is a new problem  The current episode started in the past 7 days  The problem has been unchanged  There has been no fever  Associated symptoms include congestion, coughing (productive), headaches, a plugged ear sensation, rhinorrhea and sinus pain  Pertinent negatives include no abdominal pain, chest pain, diarrhea, dysuria, ear pain, joint pain, joint swelling, nausea, neck pain, rash, sneezing, sore throat, swollen glands, vomiting or wheezing  She has tried nothing for the symptoms  The treatment provided no relief  The following portions of the patient's history were reviewed and updated as appropriate:  past social history    Review of Systems   Constitutional: Negative  HENT: Positive for congestion, rhinorrhea and sinus pain  Negative for ear pain, sneezing and sore throat  Eyes: Negative  Respiratory: Positive for cough (productive)  Negative for wheezing  Cardiovascular: Negative for chest pain  Gastrointestinal: Negative for abdominal pain, diarrhea, nausea and vomiting  Endocrine: Negative  Genitourinary: Negative for dysuria  Musculoskeletal: Negative for joint pain and neck pain  Skin: Negative for rash  Neurological: Positive for headaches  Hematological: Negative  Psychiatric/Behavioral: Negative            Current Outpatient Prescriptions   Medication Sig Dispense Refill    aspirin 81 MG tablet Take by mouth      metoprolol tartrate (LOPRESSOR) 50 mg tablet Take by mouth      omeprazole (PriLOSEC) 20 mg delayed release capsule Take 20 mg by mouth daily      triamterene-hydrochlorothiazide (MAXZIDE-25) 37 5-25 mg per tablet Take by mouth      azithromycin (ZITHROMAX) 250 mg tablet Take 2 tablets today then 1 tablet daily x 4 days 6 tablet 0     No current facility-administered medications for this visit  Objective:    /80   Pulse 80   Temp 98 4 °F (36 9 °C)   Resp 16   Ht 5' 4" (1 626 m)   Wt 67 6 kg (149 lb)   SpO2 95%   BMI 25 58 kg/m²        Physical Exam   Constitutional: She appears well-developed and well-nourished  HENT:   Head: Normocephalic and atraumatic  Eyes: Pupils are equal, round, and reactive to light  Neck: Normal range of motion  Neck supple  Cardiovascular: Normal rate, regular rhythm, normal heart sounds and intact distal pulses  Pulmonary/Chest: Effort normal and breath sounds normal    Abdominal: Soft  Bowel sounds are normal    Musculoskeletal: Normal range of motion  Neurological: She is alert  Skin: Skin is warm and dry  Psychiatric: She has a normal mood and affect  Her behavior is normal  Judgment and thought content normal    Nursing note and vitals reviewed               Liana Stanton DO

## 2018-07-03 DIAGNOSIS — I71.2 THORACIC AORTIC ANEURYSM WITHOUT RUPTURE (HCC): Primary | ICD-10-CM

## 2018-07-08 DIAGNOSIS — I10 ESSENTIAL HYPERTENSION: Primary | ICD-10-CM

## 2018-07-08 RX ORDER — METOPROLOL TARTRATE 50 MG/1
TABLET, FILM COATED ORAL
Qty: 180 TABLET | Refills: 3 | Status: SHIPPED | OUTPATIENT
Start: 2018-07-08 | End: 2019-02-26 | Stop reason: SDUPTHER

## 2018-07-23 ENCOUNTER — TRANSCRIBE ORDERS (OUTPATIENT)
Dept: RADIOLOGY | Facility: HOSPITAL | Age: 83
End: 2018-07-23

## 2018-07-23 ENCOUNTER — HOSPITAL ENCOUNTER (OUTPATIENT)
Dept: RADIOLOGY | Facility: HOSPITAL | Age: 83
Discharge: HOME | End: 2018-07-23
Attending: THORACIC SURGERY (CARDIOTHORACIC VASCULAR SURGERY)
Payer: COMMERCIAL

## 2018-07-23 DIAGNOSIS — I71.2 THORACIC AORTIC ANEURYSM WITHOUT RUPTURE (HCC): ICD-10-CM

## 2018-07-23 PROCEDURE — 71250 CT THORAX DX C-: CPT

## 2018-08-01 ENCOUNTER — OFFICE VISIT (OUTPATIENT)
Dept: CARDIAC SURGERY | Facility: CLINIC | Age: 83
End: 2018-08-01
Payer: COMMERCIAL

## 2018-08-01 VITALS
HEIGHT: 64 IN | DIASTOLIC BLOOD PRESSURE: 72 MMHG | BODY MASS INDEX: 25.93 KG/M2 | WEIGHT: 151.9 LBS | SYSTOLIC BLOOD PRESSURE: 140 MMHG | OXYGEN SATURATION: 97 % | TEMPERATURE: 97.2 F | HEART RATE: 64 BPM | RESPIRATION RATE: 14 BRPM

## 2018-08-01 DIAGNOSIS — I71.2 ANEURYSM OF ASCENDING AORTA (HCC): Primary | ICD-10-CM

## 2018-08-01 PROCEDURE — 99213 OFFICE O/P EST LOW 20 MIN: CPT | Performed by: NURSE PRACTITIONER

## 2018-08-01 NOTE — PROGRESS NOTES
Aortic Clinic  Jacinto Ray 81 y o  female MRN: [id number]      Reason for Consult / Principal Problem: Ascending aortic aneurysm    History of Present Illness: Jacinto Ray is a 81y o  year old female who presents today for ongoing surveillance of an ascending aortic aneurysm  This was initially identified in 2006 at which time measured 47 mm  It has remained stable over the years with only minimal change in size  Her last echocardiogram performed in 2013 identified a trileaflet aortic valve with no valvular dysfunction  Her last visit in aortic clinic was in August 2016 and CT scan demonstrated maximal ascending aortic diameter measured at 49-50 mm  Today Chavez Miller presents for routine 2 year follow up  She states she has been well since her last visit her and denies any change in her health status  She denies chest pain, upper back pain, SOB, lightheadedness, limb pain, weakness or numbness  Chavez Miller also has a known history of left ICA stenosis and left subclavian stenosis with unequal BP's' left arm 400 mm Hg lower than right today  She denies any TIA/CVA symptoms or left arm paresthesias       Past Medical History:  Past Medical History:   Diagnosis Date    Aorta aneurysm ([address] )     Cardiac disease     GERD (gastroesophageal reflux disease)     Hematuria     last assessed: 10/28/2013    Shortness of breath     last assessed: 6/27/2013         Past Surgical History:   Past Surgical History:   Procedure Laterality Date    ADENOIDECTOMY      APPENDECTOMY      CATARACT EXTRACTION      CERVICAL FUSION      HYSTERECTOMY      OTHER SURGICAL HISTORY      paravaginal defect graft-reinforced repair    REPAIR RECTOCELE      TONSILLECTOMY      VEIN LIGATION AND STRIPPING Bilateral          Family History:  Family History   Problem Relation Age of Onset    Diabetes Mother         mellitus    Glaucoma Mother     Hypertension Mother     Macular degeneration Mother     Stroke Father         syndrome    Hypertension Father     Cancer Sister     Dementia Sister     Heart disease Sister          Social History:    History   Alcohol Use No     History   Drug Use No     History   Smoking Status    Former Smoker   Smokeless Tobacco    Never Used         Home Medications:   Prior to Admission medications    Medication Sig Start Date End Date Taking? Authorizing Provider   aspirin 81 MG tablet Take by mouth   Yes Historical Provider, MD   metoprolol tartrate (LOPRESSOR) 50 mg tablet TAKE 2 TABLETS DAILY  7/8/18  Yes Radha Ledezma DO   omeprazole (PriLOSEC) 20 mg delayed release capsule Take 20 mg by mouth daily   Yes Historical Provider, MD   triamterene-hydrochlorothiazide (MAXZIDE-25) 37 5-25 mg per tablet Take by mouth 9/16/16  Yes Historical Provider, MD       Allergies:  No Known Allergies    Review of Systems:   Review of Systems - History obtained from chart review and the patient  General ROS: negative  Psychological ROS: negative  Ophthalmic ROS: wears glass, no visual disturbances   Hematological and Lymphatic ROS: negative for - bleeding problems, blood clots or bruising  Respiratory ROS: no cough, shortness of breath, or wheezing  Cardiovascular ROS: no chest pain or dyspnea on exertion  Gastrointestinal ROS: no abdominal pain, change in bowel habits, or black or bloody stools  Musculoskeletal ROS: negative  Neurological ROS: no TIA or stroke symptoms    Vital Signs:   Vitals:    08/01/18 0800 08/01/18 0824   BP: 92/74 140/72   BP Location: Left arm Right arm   Patient Position:  Sitting   Cuff Size: Adult Adult   Pulse: 64    Resp: 14    Temp: (!) 97 2 °F (36 2 °C)    TempSrc: Oral    SpO2: 97%    Weight: 68 9 kg (151 lb 14 4 oz)    Height: 5' 4" (1 626 m)        Physical Exam:  General: well developed, no acute distress  HEENT/NECK:  PERRLA  No jugular venous distention  Cardiac:Regular rate and rhythm, No murmurs, rubs or gallops    Carotid arteries: 1+ pulses, faint bruit on left  Pulmonary: Breath sounds clear bilaterally  Abdomen:  Non-tender, Non-distended  Positive bowel sounds  Upper extremities: 2+ radial pulses; brisk capillary refill  Lower extremities: Extremities warm/dry  PT/DP pulses 2+ bilaterally  No edema B/L  Neuro: Alert and oriented X 3  Sensation is grossly intact  No focal deficits  Musculoskeletal: MAEE, stable gait  Skin: Warm/Dry, without rashes or lesions  Lab Results:   Lab Results   Component Value Date    CHOL 176 09/01/2017    CHOL 174 03/01/2017    CHOL 172 09/01/2016     Lab Results   Component Value Date    HDL 58 04/03/2018    HDL 54 09/01/2017    HDL 54 03/01/2017     No results found for: [address]  Lab Results   Component Value Date    TRIG 96 04/03/2018    TRIG 80 09/01/2017    TRIG 92 03/01/2017     No components found for: CHOLHDL      Lab Results   Component Value Date    HGBA1C 6 2 (H) 04/03/2018     No results found for: CKTOTAL, CKMB, CKMBINDEX, TROPONINI    Imaging Studies:     CT Chest:   No change in 49 mm ascending aortic aneurysm    Echocardiogram: 2013  Trileaflet aortic valve, no AS/AI    I have personally reviewed pertinent reports        Assessment:  Patient Active Problem List    Diagnosis Date Noted    Bronchitis 07/02/2018    CAD in native artery 04/05/2018    Thoracic aortic aneurysm without rupture [address] [address] ) 04/05/2018    Hypertension 04/05/2018    Carotid artery obstruction, left 04/05/2018    Shoulder impingement syndrome, right 02/19/2018    Arteriosclerosis of carotid artery 05/21/2015    Carotid atherosclerosis 10/23/2014    Chronic kidney disease, stage 3 05/01/2014    Hyperlipidemia 04/24/2014    Aneurysm of ascending aorta (HCC) 10/24/2013    Impaired fasting glucose 02/27/2013    GERD without esophagitis 02/01/2013    Auditory vertigo 02/01/2013    Polyp of sigmoid colon 02/01/2013     Stable ascending aortic aneurysm    Plan:    CT imaging performed prior to this visit demonstrates the ascending aorta measuring 49 mm in size at its greatest diameter  Surgery is not indicated  Imaging over the past 12 years as demonstrated stability and aortic calcification  In light of these findings and her age, we recommend no further routine imaging or surveillance in aortic clinic  Saman Olivo was comfortable with our recommendations, and her questions were answered to her satisfaction  Thank you for allowing us to participate in the care of this patient       SIGNATURE: RAMIN Barkley  DATE: August 1, 2018  TIME: 8:38 AM

## 2018-09-04 DIAGNOSIS — I10 ESSENTIAL HYPERTENSION: Primary | ICD-10-CM

## 2018-09-04 RX ORDER — TRIAMTERENE AND HYDROCHLOROTHIAZIDE 37.5; 25 MG/1; MG/1
TABLET ORAL
Qty: 90 TABLET | Refills: 3 | Status: SHIPPED | OUTPATIENT
Start: 2018-09-04 | End: 2019-02-26 | Stop reason: SDUPTHER

## 2018-10-06 ENCOUNTER — IMMUNIZATION (OUTPATIENT)
Dept: FAMILY MEDICINE CLINIC | Facility: CLINIC | Age: 83
End: 2018-10-06
Payer: COMMERCIAL

## 2018-10-06 DIAGNOSIS — Z23 ENCOUNTER FOR IMMUNIZATION: ICD-10-CM

## 2018-10-06 PROCEDURE — G0008 ADMIN INFLUENZA VIRUS VAC: HCPCS

## 2018-10-06 PROCEDURE — 90662 IIV NO PRSV INCREASED AG IM: CPT

## 2018-10-09 LAB
ALBUMIN SERPL-MCNC: 4.1 G/DL (ref 3.6–5.1)
ALBUMIN/GLOB SERPL: 1.3 (CALC) (ref 1–2.5)
ALP SERPL-CCNC: 97 U/L (ref 33–130)
ALT SERPL-CCNC: 21 U/L (ref 6–29)
AST SERPL-CCNC: 27 U/L (ref 10–35)
BILIRUB SERPL-MCNC: 0.6 MG/DL (ref 0.2–1.2)
BUN SERPL-MCNC: 33 MG/DL (ref 7–25)
BUN/CREAT SERPL: 23 (CALC) (ref 6–22)
CALCIUM SERPL-MCNC: 9.7 MG/DL (ref 8.6–10.4)
CHLORIDE SERPL-SCNC: 98 MMOL/L (ref 98–110)
CHOLEST SERPL-MCNC: 158 MG/DL
CHOLEST/HDLC SERPL: 3 (CALC)
CO2 SERPL-SCNC: 30 MMOL/L (ref 20–32)
CREAT SERPL-MCNC: 1.41 MG/DL (ref 0.6–0.88)
ERYTHROCYTE [DISTWIDTH] IN BLOOD BY AUTOMATED COUNT: 13.1 % (ref 11–15)
EST. AVERAGE GLUCOSE BLD GHB EST-MCNC: 134 (CALC)
EST. AVERAGE GLUCOSE BLD GHB EST-SCNC: 7.4 (CALC)
GLOBULIN SER CALC-MCNC: 3.1 G/DL (CALC) (ref 1.9–3.7)
GLUCOSE SERPL-MCNC: 124 MG/DL (ref 65–99)
HBA1C MFR BLD: 6.3 % OF TOTAL HGB
HCT VFR BLD AUTO: 41.3 % (ref 35–45)
HDLC SERPL-MCNC: 52 MG/DL
HGB BLD-MCNC: 13.9 G/DL (ref 11.7–15.5)
LDLC SERPL CALC-MCNC: 91 MG/DL (CALC)
MCH RBC QN AUTO: 31.2 PG (ref 27–33)
MCHC RBC AUTO-ENTMCNC: 33.7 G/DL (ref 32–36)
MCV RBC AUTO: 92.8 FL (ref 80–100)
NONHDLC SERPL-MCNC: 106 MG/DL (CALC)
PLATELET # BLD AUTO: 194 THOUSAND/UL (ref 140–400)
PMV BLD REES-ECKER: 9.9 FL (ref 7.5–12.5)
POTASSIUM SERPL-SCNC: 4 MMOL/L (ref 3.5–5.3)
PROT SERPL-MCNC: 7.2 G/DL (ref 6.1–8.1)
RBC # BLD AUTO: 4.45 MILLION/UL (ref 3.8–5.1)
SL AMB EGFR AFRICAN AMERICAN: 40 ML/MIN/1.73M2
SL AMB EGFR NON AFRICAN AMERICAN: 35 ML/MIN/1.73M2
SODIUM SERPL-SCNC: 137 MMOL/L (ref 135–146)
TRIGL SERPL-MCNC: 68 MG/DL
TSH SERPL-ACNC: 1.45 MIU/L (ref 0.4–4.5)
WBC # BLD AUTO: 4.9 THOUSAND/UL (ref 3.8–10.8)

## 2018-10-24 ENCOUNTER — OFFICE VISIT (OUTPATIENT)
Dept: FAMILY MEDICINE CLINIC | Facility: CLINIC | Age: 83
End: 2018-10-24
Payer: COMMERCIAL

## 2018-10-24 VITALS
BODY MASS INDEX: 25.99 KG/M2 | OXYGEN SATURATION: 98 % | HEIGHT: 64 IN | SYSTOLIC BLOOD PRESSURE: 98 MMHG | RESPIRATION RATE: 16 BRPM | DIASTOLIC BLOOD PRESSURE: 60 MMHG | TEMPERATURE: 97.1 F | HEART RATE: 74 BPM | WEIGHT: 152.2 LBS

## 2018-10-24 DIAGNOSIS — I71.2 ANEURYSM OF ASCENDING AORTA (HCC): ICD-10-CM

## 2018-10-24 DIAGNOSIS — I71.2 THORACIC AORTIC ANEURYSM WITHOUT RUPTURE (HCC): ICD-10-CM

## 2018-10-24 DIAGNOSIS — E78.2 MIXED HYPERLIPIDEMIA: ICD-10-CM

## 2018-10-24 DIAGNOSIS — N18.30 CHRONIC KIDNEY DISEASE, STAGE 3 (HCC): ICD-10-CM

## 2018-10-24 DIAGNOSIS — R73.01 IMPAIRED FASTING GLUCOSE: Primary | ICD-10-CM

## 2018-10-24 DIAGNOSIS — I10 ESSENTIAL HYPERTENSION: ICD-10-CM

## 2018-10-24 DIAGNOSIS — Z00.00 MEDICARE ANNUAL WELLNESS VISIT, SUBSEQUENT: ICD-10-CM

## 2018-10-24 DIAGNOSIS — I65.22 CAROTID ARTERY OBSTRUCTION, LEFT: ICD-10-CM

## 2018-10-24 PROBLEM — M75.41 SHOULDER IMPINGEMENT SYNDROME, RIGHT: Status: RESOLVED | Noted: 2018-02-19 | Resolved: 2018-10-24

## 2018-10-24 PROBLEM — J40 BRONCHITIS: Status: RESOLVED | Noted: 2018-07-02 | Resolved: 2018-10-24

## 2018-10-24 PROCEDURE — G0439 PPPS, SUBSEQ VISIT: HCPCS | Performed by: FAMILY MEDICINE

## 2018-10-24 PROCEDURE — 1170F FXNL STATUS ASSESSED: CPT | Performed by: FAMILY MEDICINE

## 2018-10-24 PROCEDURE — 1125F AMNT PAIN NOTED PAIN PRSNT: CPT | Performed by: FAMILY MEDICINE

## 2018-10-24 PROCEDURE — 1160F RVW MEDS BY RX/DR IN RCRD: CPT | Performed by: FAMILY MEDICINE

## 2018-10-24 PROCEDURE — 3008F BODY MASS INDEX DOCD: CPT | Performed by: FAMILY MEDICINE

## 2018-10-24 PROCEDURE — 99214 OFFICE O/P EST MOD 30 MIN: CPT | Performed by: FAMILY MEDICINE

## 2018-10-24 PROCEDURE — 3078F DIAST BP <80 MM HG: CPT | Performed by: FAMILY MEDICINE

## 2018-10-24 PROCEDURE — 4040F PNEUMOC VAC/ADMIN/RCVD: CPT | Performed by: FAMILY MEDICINE

## 2018-10-24 PROCEDURE — 3074F SYST BP LT 130 MM HG: CPT | Performed by: FAMILY MEDICINE

## 2018-10-24 NOTE — PROGRESS NOTES
Assessment and Plan:    Problem List Items Addressed This Visit     Medicare annual wellness visit, subsequent - Primary     Up to date             Health Maintenance Due   Topic Date Due    DTaP,Tdap,and Td Vaccines (1 - Tdap) 10/27/1956    Urinary Incontinence Screening  10/27/2001         HPI:  Raina Pacheco is a 81 y o  female here for her Subsequent Wellness Visit      Patient Active Problem List   Diagnosis    CAD in native artery    [facility]acic aortic aneurysm without rupture ([facility] [address] [facility]    Hypertension    Carotid artery obstruction, left    Aneurysm of ascending aorta (HCC)    Arteriosclerosis of carotid artery    Carotid atherosclerosis    Chronic kidney disease, stage 3 ([facility] [address] [facility]    GERD without esophagitis    Hyperlipidemia    Impaired fasting glucose    Auditory vertigo    Polyp of sigmoid colon    Bronchitis    Medicare annual wellness visit, subsequent     Past Medical History:   Diagnosis Date    [facility]     Cardiac disease     GERD (gastroesophageal reflux disease)     Hematuria     last assessed: 10/28/2013    Shortness of breath     last assessed: 6/27/2013     Past Surgical History:   Procedure Laterality Date    ADENOIDECTOMY      APPENDECTOMY      CATARACT EXTRACTION      CERVICAL FUSION      HYSTERECTOMY      OTHER SURGICAL HISTORY      paravaginal defect graft-reinforced repair    REPAIR RECTOCELE      TONSILLECTOMY      VEIN LIGATION AND STRIPPING Bilateral      Family History   Problem Relation Age of Onset    Diabetes Mother         mellitus    Glaucoma Mother     Hypertension Mother     Macular degeneration Mother     Stroke Father         syndrome    Hypertension Father     Cancer Sister     Dementia Sister     Heart disease Sister      History   Smoking Status    Former Smoker   Smokeless Tobacco    Never Used     History   Alcohol Use No      History   Drug Use No       Current Outpatient Prescriptions   Medication Sig Dispense Refill    aspirin 81 MG tablet Take by mouth      metoprolol tartrate (LOPRESSOR) 50 mg tablet TAKE 2 TABLETS DAILY  180 tablet 3    omeprazole (PriLOSEC) 20 mg delayed release capsule Take 20 mg by mouth daily      triamterene-hydrochlorothiazide (MAXZIDE-25) 37 5-25 mg per tablet take 1 tablet by mouth once daily 90 tablet 3     No current facility-administered medications for this visit  No Known Allergies  Immunization History   Administered Date(s) Administered    Influenza 01/20/2012, 11/07/2012, 10/07/2017    Influenza Split High Dose Preservative Free IM 10/28/2013, 10/23/2014, 09/02/2015, 09/26/2016, 10/07/2017    Influenza TIV (IM) 01/01/2012    Influenza, high dose seasonal 0 5 mL 10/06/2018    Pneumococcal Conjugate 13-Valent 02/12/2015    Pneumococcal Polysaccharide PPV23 08/30/2011, 01/01/2012    Zoster 01/01/2013       Patient Care Team:  Nikolas Norman DO as PCP - General  Fritzi Ormond, MD Leanord Blumenthal, MD Caralee Chandler, DO Maryann Alexandria, MD Margarite Solum, DO    Medicare Screening Tests and Risk Assessments:  Ray Whitt is here for her Subsequent Wellness visit  Health Risk Assessment:  Patient rates overall health as good  Patient feels that their physical health rating is Same  Eyesight was rated as Same  Hearing was rated as Same  Patient feels that their emotional and mental health rating is Much better  Pain experienced by patient in the last 7 days has been None  Patient states that she has experienced no weight loss or gain in last 6 months  Emotional/Mental Health:  Patient has been feeling nervous/anxious  PHQ-9 Depression Screening:    Frequency of the following problems over the past two weeks:      1  Little interest or pleasure in doing things: 0 - not at all      2  Feeling down, depressed, or hopeless: 0 - not at all  PHQ-2 Score: 0          Broken Bones/Falls:     Fall Risk Assessment:    In the past year, patient has experienced: History of falling in past year     Number of falls: 1  Patient does not feel she is unsteady standing  Patient is not taking medication that can cause feelings of lightheadedness or tiredness  Patient often has no need to rush to the toilet  Bladder/Bowel:  Patient has not leaked urine accidently in the last six months  Patient reports no loss of bowel control  Immunizations:  Patient has had a flu vaccination within the last year  Patient has received a pneumonia shot  Patient has received a shingles shot  Patient has received tetanus/diphtheria shot  Home Safety:  Patient does not have trouble with stairs inside or outside of their home  Patient currently reports that there are no safety hazards present in home, working smoke alarms, working carbon monoxide detectors  Preventative Screenings:   Breast cancer screening performed, no colon cancer screen completed, cholesterol screen completed, glaucoma eye exam completed,     Nutrition:  Current diet: Regular with servings of the following:    Medications:  Patient is not currently taking any over-the-counter supplements  Patient is able to manage medications  Lifestyle Choices:  Patient reports no tobacco use  Patient has not smoked or used tobacco in the past   Patient reports no alcohol use  Patient drives a vehicle  Patient wears seat belt  Activities of Daily Living:  Can get out of bed by his or her self, able to dress self, able to make own meals, able to do own shopping, able to bathe self, can do own laundry/housekeeping, can manage own money, pay bills and track expenses    Previous Hospitalizations:  No hospitalization or ED visit in past 12 months        Advanced Directives:  Patient has decided on a power of   Patient has spoken to designated power of   Patient has completed advanced directive          Preventative Screening/Counseling:      Cardiovascular:      General: Screening Current          Diabetes:      General: Screening Current          Colorectal Cancer:      General: Screening Current          Breast Cancer:      General: Screening Current          Cervical Cancer:      General: Screening Not Indicated          Osteoporosis:      General: Screening Current          AAA:      General: Screening Current          Glaucoma:      General: Screening Current          HIV:      General: Screening Not Indicated          Hepatitis C:      General: Screening Not Indicated        Advanced Directives:   Patient has living will for healthcare, has durable POA for healthcare, patient has an advanced directive       Immunizations:      Influenza: Influenza UTD This Year      Pneumococcal: Pneumococcal Due Today      Shingrix: Risks & Benefits Discussed

## 2018-10-24 NOTE — PROGRESS NOTES
Assessment/Plan:    Problem List Items Addressed This Visit     Thoracic aortic aneurysm without rupture ([facility] [address] )     Seeing CT surgery  Stable - no longer needs follow up         Hypertension     Cont metoprolol and maxzide  Consider cutting maxide in half         Relevant Orders    CBC    Comprehensive metabolic panel    Carotid artery obstruction, left    Relevant Orders    VAS carotid complete study    Aneurysm of ascending aorta (HCC)     Discharged from CT surgery         Chronic kidney disease, stage 3 (HCC)     Stable creatinine  avoind nephrotoxic meds         Hyperlipidemia     No meds taken  Doing well         Relevant Orders    Lipid Panel with Direct LDL reflex    TSH, 3rd generation with Free T4 reflex    Impaired fasting glucose - Primary     a1c 6 3  watchi diet a little         Relevant Orders    Hemoglobin A1C With EAG    Medicare annual wellness visit, subsequent     Up to date               Patient Instructions       Obesity   AMBULATORY CARE:   Obesity  is when your body mass index (BMI) is greater than 30  Your healthcare provider will use your height and weight to measure your BMI  The risks of obesity include  many health problems, such as injuries or physical disability  You may need tests to check for the following:  · Diabetes     · High blood pressure or high cholesterol     · Heart disease     · Gallbladder or liver disease     · Cancer of the colon, breast, prostate, liver, or kidney     · Sleep apnea     · Arthritis or gout  Seek care immediately if:   · You have a severe headache, confusion, or difficulty speaking  · You have weakness on one side of your body  · You have chest pain, sweating, or shortness of breath  Contact your healthcare provider if:   · You have symptoms of gallbladder or liver disease, such as pain in your upper abdomen  · You have knee or hip pain and discomfort while walking       · You have symptoms of diabetes, such as intense hunger and thirst, and frequent urination  · You have symptoms of sleep apnea, such as snoring or daytime sleepiness  · You have questions or concerns about your condition or care  Treatment for obesity  focuses on helping you lose weight to improve your health  Even a small decrease in BMI can reduce the risk for many health problems  Your healthcare provider will help you set a weight-loss goal   · Lifestyle changes  are the first step in treating obesity  These include making healthy food choices and getting regular physical activity  Your healthcare provider may suggest a weight-loss program that involves coaching, education, and therapy  · Medicine  may help you lose weight when it is used with a healthy diet and physical activity  · Surgery  can help you lose weight if you are very obese and have other health problems  There are several types of weight-loss surgery  Ask your healthcare provider for more information  Be successful losing weight:   · Set small, realistic goals  An example of a small goal is to walk for 20 minutes 5 days a week  Anther goal is to lose 5% of your body weight  · Tell friends, family members, and coworkers about your goals  and ask for their support  Ask a friend to lose weight with you, or join a weight-loss support group  · Identify foods or triggers that may cause you to overeat , and find ways to avoid them  Remove tempting high-calorie foods from your home and workplace  Place a bowl of fresh fruit on your kitchen counter  If stress causes you to eat, then find other ways to cope with stress  · Keep a diary to track what you eat and drink  Also write down how many minutes of physical activity you do each day  Weigh yourself once a week and record it in your diary  Eating changes: You will need to eat 500 to 1,000 fewer calories each day than you currently eat to lose 1 to 2 pounds a week  The following changes will help you cut calories:  · Eat smaller portions    Use small plates, no larger than 9 inches in diameter  Fill your plate half full of fruits and vegetables  Measure your food using measuring cups until you know what a serving size looks like  · Eat 3 meals and 1 or 2 snacks each day  Plan your meals in advance  Adebayo Oquendo and eat at home most of the time  Eat slowly  · Eat fruits and vegetables at every meal   They are low in calories and high in fiber, which makes you feel full  Do not add butter, margarine, or cream sauce to vegetables  Use herbs to season steamed vegetables  · Eat less fat and fewer fried foods  Eat more baked or grilled chicken and fish  These protein sources are lower in calories and fat than red meat  Limit fast food  Dress your salads with olive oil and vinegar instead of bottled dressing  · Limit the amount of sugar you eat  Do not drink sugary beverages  Limit alcohol  Activity changes:  Physical activity is good for your body in many ways  It helps you burn calories and build strong muscles  It decreases stress and depression, and improves your mood  It can also help you sleep better  Talk to your healthcare provider before you begin an exercise program   · Exercise for at least 30 minutes 5 days a week  Start slowly  Set aside time each day for physical activity that you enjoy and that is convenient for you  It is best to do both weight training and an activity that increases your heart rate, such as walking, bicycling, or swimming  · Find ways to be more active  Do yard work and housecleaning  Walk up the stairs instead of using elevators  Spend your leisure time going to events that require walking, such as outdoor festivals or fairs  This extra physical activity can help you lose weight and keep it off  Follow up with your healthcare provider as directed: You may need to meet with a dietitian  Write down your questions so you remember to ask them during your visits     © 2017 [address] Information is for End User's use only and may not be sold, redistributed or otherwise used for commercial purposes  All illustrations and images included in CareNotes® are the copyrighted property of A D A M , Inc  or [employer] Cow[employer]  The above information is an  only  It is not intended as medical advice for individual conditions or treatments  Talk to your doctor, nurse or pharmacist before following any medical regimen to see if it is safe and effective for you  Urinary Incontinence   WHAT YOU NEED TO KNOW:   What is urinary incontinence? Urinary incontinence (UI) is when you lose control of your bladder  What causes UI? UI occurs because your bladder cannot store or empty urine properly  The following are the most common types of UI:  · Stress incontinence  is when you leak urine due to increased bladder pressure  This may happen when you cough, sneeze, or exercise  · Urge incontinence  is when you feel the need to urinate right away and leak urine accidentally  · Mixed incontinence  is when you have both stress and urge UI  What are the signs and symptoms of UI?   · You feel like your bladder does not empty completely when you urinate  · You urinate often and need to urinate immediately  · You leak urine when you sleep, or you wake up with the urge to urinate  · You leak urine when you cough, sneeze, exercise, or laugh  How is UI diagnosed? Your healthcare provider will ask how often you leak urine and whether you have stress or urge symptoms  Tell him which medicines you take, how often you urinate, and how much liquid you drink each day  You may need any of the following tests:  · Urine tests  may show infection or kidney function  · A pelvic exam  may be done to check for blockages  A pelvic exam will also show if your bladder, uterus, or other organs have moved out of place  · An x-ray, ultrasound, or CT  may show problems with parts of your urinary system   You may be given contrast liquid to help your organs show up better in the pictures  Tell the healthcare provider if you have ever had an allergic reaction to contrast liquid  Do not enter the MRI room with anything metal  Metal can cause serious injury  Tell the healthcare provider if you have any metal in or on your body  · A bladder scan  will show how much urine is left in your bladder after you urinate  You will be asked to urinate and then healthcare providers will use a small ultrasound machine to check the urine left in your bladder  · Cystometry  is used to check the function of your urinary system  Your healthcare provider checks the pressure in your bladder while filling it with fluid  Your bladder pressure may also be tested when your bladder is full and while you urinate  How is UI treated? · Medicines  can help strengthen your bladder control  · Electrical stimulation  is used to send a small amount of electrical energy to your pelvic floor muscles  This helps control your bladder function  Electrodes may be placed outside your body or in your rectum  For women, the electrodes may be placed in the vagina  · A bulking agent  may be injected into the wall of your urethra to make it thicker  This helps keep your urethra closed and decreases urine leakage  · Devices  such as a clamp, pessary, or tampon may help stop urine leaks  Ask your healthcare provider for more information about these and other devices  · Surgery  may be needed if other treatments do not work  Several types of surgery can help improve your bladder control  Ask your healthcare provider for more information about the surgery you may need  How can I manage my symptoms? · Do pelvic muscle exercises often  Your pelvic muscles help you stop urinating  Squeeze these muscles tight for 5 seconds, then relax for 5 seconds  Gradually work up to squeezing for 10 seconds  Do 3 sets of 15 repetitions a day, or as directed   This will help strengthen your pelvic muscles and improve bladder control  · A catheter  may be used to help empty your bladder  A catheter is a tiny, plastic tube that is put into your bladder to drain your urine  Your healthcare provider may tell you to use a catheter to prevent your bladder from getting too full and leaking urine  · Keep a UI record  Write down how often you leak urine and how much you leak  Make a note of what you were doing when you leaked urine  · Train your bladder  Go to the bathroom at set times, such as every 2 hours, even if you do not feel the urge to go  You can also try to hold your urine when you feel the urge to go  For example, hold your urine for 5 minutes when you feel the urge to go  As that becomes easier, hold your urine for 10 minutes  · Drink liquids as directed  Ask your healthcare provider how much liquid to drink each day and which liquids are best for you  You may need to limit the amount of liquid you drink to help control your urine leakage  Limit or do not have drinks that contain caffeine or alcohol  Do not drink any liquid right before you go to bed  · Prevent constipation  Eat a variety of high-fiber foods  Good examples are high-fiber cereals, beans, vegetables, and whole-grain breads  Prune juice may help make your bowel movement softer  Walking is the best way to trigger your intestines to have a bowel movement  · Exercise regularly and maintain a healthy weight  Ask your healthcare provider how much you should weigh and about the best exercise plan for you  Weight loss and exercise will decrease pressure on your bladder and help you control your leakage  Ask him to help you create a weight loss plan if you are overweight  When should I seek immediate care? · You have severe pain  · You are confused or cannot think clearly  When should I contact my healthcare provider? · You have a fever  · You see blood in your urine      · You have pain when you urinate  · You have new or worse pain, even after treatment  · Your mouth feels dry or you have vision changes  · Your urine is cloudy or smells bad  · You have questions or concerns about your condition or care  CARE AGREEMENT:   You have the right to help plan your care  Learn about your health condition and how it may be treated  Discuss treatment options with your caregivers to decide what care you want to receive  You always have the right to refuse treatment  The above information is an  only  It is not intended as medical advice for individual conditions or treatments  Talk to your doctor, nurse or pharmacist before following any medical regimen to see if it is safe and effective for you  © 2017 [address] Information is for End User's use only and may not be sold, redistributed or otherwise used for commercial purposes  All illustrations and images included in CareNotes® are the copyrighted property of [employer]  or [employer]  Cigarette Smoking and Your Health   AMBULATORY CARE:   Risks to your health if you smoke:  Nicotine and other chemicals found in tobacco damage every cell in your body  Even if you are a light smoker, you have an increased risk for cancer, heart disease, and lung disease  If you are pregnant or have diabetes, smoking increases your risk for complications  Benefits to your health if you stop smoking:   · You decrease respiratory symptoms such as coughing, wheezing, and shortness of breath  · You reduce your risk for cancers of the lung, mouth, throat, kidney, bladder, pancreas, stomach, and cervix  If you already have cancer, you increase the benefits of chemotherapy  You also reduce your risk for cancer returning or a second cancer from developing  · You reduce your risk for heart disease, blood clots, heart attack, and stroke       · You reduce your risk for lung infections, and diseases such as pneumonia, asthma, chronic bronchitis, and emphysema  · Your circulation improves  More oxygen can be delivered to your body  If you have diabetes, you lower your risk for complications, such as kidney, artery, and eye diseases  You also lower your risk for nerve damage  Nerve damage can lead to amputations, poor vision, and blindness  · You improve your body's ability to heal and to fight infections  Benefits to the health of others if you stop smoking:  Tobacco is harmful to nonsmokers who breathe in your secondhand smoke  The following are ways the health of others around you may improve when you stop smoking:  · You lower the risks for lung cancer and heart disease in nonsmoking adults  · If you are pregnant, you lower the risk for miscarriage, early delivery, low birth weight, and stillbirth  You also lower your baby's risk for SIDS, obesity, developmental delay, and neurobehavioral problems, such as ADHD  · If you have children, you lower their risk for ear infections, colds, pneumonia, bronchitis, and asthma  For more information and support to stop smoking:   · [employer]  Phone: 8- 441 - 432-9600  Web Address: www [employer]  Follow up with your healthcare provider as directed:  Write down your questions so you remember to ask them during your visits  © 2017 [address] Information is for End User's use only and may not be sold, redistributed or otherwise used for commercial purposes  All illustrations and images included in CareNotes® are the copyrighted property of [employer] M , Inc  or [employer] Cowan  The above information is an  only  It is not intended as medical advice for individual conditions or treatments  Talk to your doctor, nurse or pharmacist before following any medical regimen to see if it is safe and effective for you    Fall Prevention   AMBULATORY CARE:   Fall prevention  includes ways to make your home and other areas safer  It also includes ways you can move more carefully to prevent a fall  Health conditions that cause changes in your blood pressure, vision, or muscle strength and coordination may increase your risk for falls  Medicines may also increase your risk for falls if they make you dizzy, weak, or sleepy  Call 911 or have someone else call if:   · You have fallen and are unconscious  · You have fallen and cannot move part of your body  Contact your healthcare provider if:   · You have fallen and have pain or a headache  · You have questions or concerns about your condition or care  Fall prevention tips:   · Stand or sit up slowly  This may help you keep your balance and prevent falls  · Use assistive devices as directed  Your healthcare provider may suggest that you use a cane or walker to help you keep your balance  You may need to have grab bars put in your bathroom near the toilet or in the shower  · Wear shoes that fit well and have soles that   Wear shoes both inside and outside  Use slippers with good   Do not wear shoes with high heels  · Wear a personal alarm  This is a device that allows you to call 911 if you fall and need help  Ask your healthcare provider for more information  · Stay active  Exercise can help strengthen your muscles and improve your balance  Your healthcare provider may recommend water aerobics or walking  He or she may also recommend physical therapy to improve your coordination  Never start an exercise program without talking to your healthcare provider first      · Manage your medical conditions  Keep all appointments with your healthcare providers  Visit your eye doctor as directed  Home safety tips:   · Add items to prevent falls in the bathroom  Put nonslip strips on your bath or shower floor to prevent you from slipping  Use a bath mat if you do not have carpet in the bathroom   This will prevent you from falling when you step out of the bath or shower  Use a shower seat so you do not need to stand while you shower  Sit on the toilet or a chair in your bathroom to dry yourself and put on clothing  This will prevent you from losing your balance from drying or dressing yourself while you are standing  · Keep paths clear  Remove books, shoes, and other objects from walkways and stairs  Place cords for telephones and lamps out of the way so that you do not need to walk over them  Tape them down if you cannot move them  Remove small rugs  If you cannot remove a rug, secure it with double-sided tape  This will prevent you from tripping  · Install bright lights in your home  Use night lights to help light paths to the bathroom or kitchen  Always turn on the light before you start walking  · Keep items you use often on shelves within reach  Do not use a step stool to help you reach an item  · Paint or place reflective tape on the edges of your stairs  This will help you see the stairs better  Follow up with your healthcare provider as directed:  Write down your questions so you remember to ask them during your visits  © 2017 [address] [facility] Information is for End User's use only and may not be sold, redistributed or otherwise used for commercial purposes  All illustrations and images included in CareNotes® are the copyrighted property of [employer]  or [employer] Cowan  The above information is an  only  It is not intended as medical advice for individual conditions or treatments  Talk to your doctor, nurse or pharmacist before following any medical regimen to see if it is safe and effective for you  Advance Directives   WHAT YOU NEED TO KNOW:   What are advance directives? Advance directives are legal documents that state your wishes and plans for medical care  These plans are made ahead of time in case you lose your ability to make decisions for yourself   Advance directives can apply to any medical decision, such as the treatments you want, and if you want to donate organs  What are the types of advance directives? There are many types of advance directives, and each state has rules about how to use them  You may choose a combination of any of the following:  · Living will: This is a written record of the treatment you want  You can also choose which treatments you do not want, which to limit, and which to stop at a certain time  This includes surgery, medicine, IV fluid, and tube feedings  · Durable power of  for healthcare [facility] [place]): This is a written record that states who you want to make healthcare choices for you when you are unable to make them for yourself  This person, called a proxy, is usually a family member or a friend  You may choose more than 1 proxy  · Do not resuscitate (DNR) order:  A DNR order is used in case your heart stops beating or you stop breathing  It is a request not to have certain forms of treatment, such as CPR  A DNR order may be included in other types of advance directives  · Medical directive: This covers the care that you want if you are in a coma, near death, or unable to make decisions for yourself  You can list the treatments you want for each condition  Treatment may include pain medicine, surgery, blood transfusions, dialysis, IV or tube feedings, and a ventilator (breathing machine)  · Values history: This document has questions about your views, beliefs, and how you feel and think about life  This information can help others choose the care that you would choose  Why are advance directives important? An advance directive helps you control your care  Although spoken wishes may be used, it is better to have your wishes written down  Spoken wishes can be misunderstood, or not followed  Treatments may be given even if you do not want them  An advance directive may make it easier for your family to make difficult choices about your care     How do I decide what to put in my advance directives? · Make informed decisions:  Make sure you fully understand treatments or care you may receive  Think about the benefits and problems your decisions could cause for you or your family  Talk to healthcare providers if you have concerns or questions before you write down your wishes  You may also want to talk with your Jew or , or a   Check your state laws to make sure that what you put in your advance directive is legal      · Sign all forms:  Sign and date your advance directive when you have finished  You may also need 2 witnesses to sign the forms  Witnesses cannot be your doctor or his staff, your spouse, heirs or beneficiaries, people you owe money to, or your chosen proxy  Talk to your family, proxy, and healthcare providers about your advance directive  Give each person a copy, and keep one for yourself in a place you can get to easily  Do not keep it hidden or locked away  · Review and revise your plans: You can revise your advance directive at any time, as long as you are able to make decisions  Review your plan every year, and when there are changes in your life, or your health  When you make changes, let your family, proxy, and healthcare providers know  Give each a new copy  Where can I find more information? · [employer]  [employer] [address] , [address]  Phone: 3- 134 - [phone]  Phone: 8- 245 - [phone]  Web Address: http://www  aafp org  · [address] [facility])  [address] [facility], [address]  Phone: [phone]  Phone: [phone]  Web Address: Shanae bernal  CARE AGREEMENT:   You have the right to help plan your care  To help with this plan, you must learn about your health condition and treatment options  You must also learn about advance directives and how they are used   Work with your healthcare providers to decide what care will be used to treat you  You always have the right to refuse treatment  The above information is an  only  It is not intended as medical advice for individual conditions or treatments  Talk to your doctor, nurse or pharmacist before following any medical regimen to see if it is safe and effective for you  © 2017 [address] Information is for End User's use only and may not be sold, redistributed or otherwise used for commercial purposes  All illustrations and images included in CareNotes® are the copyrighted property of A D A M , Inc  or [employer]  Sheree in 6 months (on 4/24/2019)  Subjective:      Patient ID: Rolanda Guardado is a 81 y o  female  Chief Complaint   Patient presents with   [facility] [place] [facility]ness Visit     Patient here for Medicare wellness exam        Here for follow up  Overall doing well  Full of energy  Fell once after catching rug under feet- face was quiet bruised, healing now      Hypertension   This is a chronic problem  The current episode started today  The problem is unchanged  The problem is controlled  There are no associated agents to hypertension  Risk factors for coronary artery disease include dyslipidemia  Past treatments include beta blockers and diuretics  The current treatment provides significant improvement  There are no compliance problems  Hyperlipidemia   This is a chronic problem  The current episode started more than 1 year ago  The problem is controlled  Recent lipid tests were reviewed and are normal  There are no known factors aggravating her hyperlipidemia  Current antihyperlipidemic treatment includes diet change  The current treatment provides significant improvement of lipids  There are no compliance problems  Risk factors for coronary artery disease include hypertension         The following portions of the patient's history were reviewed and updated as appropriate: allergies, current medications, past family history, past medical history, past social history, past surgical history and problem list     Review of Systems   Constitutional: Negative  HENT: Negative  Eyes: Negative  Respiratory: Negative  Cardiovascular: Negative  Gastrointestinal: Negative  Endocrine: Negative  Genitourinary: Negative  Musculoskeletal: Negative  Allergic/Immunologic: Negative  Neurological: Negative  Hematological: Negative  Psychiatric/Behavioral: Negative  Current Outpatient Prescriptions   Medication Sig Dispense Refill    aspirin 81 MG tablet Take by mouth      metoprolol tartrate (LOPRESSOR) 50 mg tablet TAKE 2 TABLETS DAILY  180 tablet 3    omeprazole (PriLOSEC) 20 mg delayed release capsule Take 20 mg by mouth daily      triamterene-hydrochlorothiazide (MAXZIDE-25) 37 5-25 mg per tablet take 1 tablet by mouth once daily 90 tablet 3     No current facility-administered medications for this visit  Objective:    BP 98/60   Pulse 74   Temp (!) 97 1 °F (36 2 °C)   Resp 16   Ht 5' 4 41" (1 636 m)   Wt 69 kg (152 lb 3 2 oz)   SpO2 98%   BMI 25 79 kg/m²        Physical Exam   Constitutional: She appears well-developed and well-nourished  HENT:   Head: Normocephalic and atraumatic  Eyes: Pupils are equal, round, and reactive to light  EOM are normal    Neck: Normal range of motion  Neck supple  Cardiovascular: Normal rate, regular rhythm, normal heart sounds and intact distal pulses  Pulmonary/Chest: Effort normal and breath sounds normal    Abdominal: Soft  Bowel sounds are normal    Musculoskeletal: Normal range of motion  Neurological: She is alert  Skin: Skin is warm and dry  Psychiatric: She has a normal mood and affect  Her behavior is normal  Judgment and thought content normal    Nursing note and vitals reviewed               Rakesh Curiel DO

## 2018-10-24 NOTE — PATIENT INSTRUCTIONS
Obesity   AMBULATORY CARE:   Obesity  is when your body mass index (BMI) is greater than 30  Your healthcare provider will use your height and weight to measure your BMI  The risks of obesity include  many health problems, such as injuries or physical disability  You may need tests to check for the following:  · Diabetes     · High blood pressure or high cholesterol     · Heart disease     · Gallbladder or liver disease     · Cancer of the colon, breast, prostate, liver, or kidney     · Sleep apnea     · Arthritis or gout  Seek care immediately if:   · You have a severe headache, confusion, or difficulty speaking  · You have weakness on one side of your body  · You have chest pain, sweating, or shortness of breath  Contact your healthcare provider if:   · You have symptoms of gallbladder or liver disease, such as pain in your upper abdomen  · You have knee or hip pain and discomfort while walking  · You have symptoms of diabetes, such as intense hunger and thirst, and frequent urination  · You have symptoms of sleep apnea, such as snoring or daytime sleepiness  · You have questions or concerns about your condition or care  Treatment for obesity  focuses on helping you lose weight to improve your health  Even a small decrease in BMI can reduce the risk for many health problems  Your healthcare provider will help you set a weight-loss goal   · Lifestyle changes  are the first step in treating obesity  These include making healthy food choices and getting regular physical activity  Your healthcare provider may suggest a weight-loss program that involves coaching, education, and therapy  · Medicine  may help you lose weight when it is used with a healthy diet and physical activity  · Surgery  can help you lose weight if you are very obese and have other health problems  There are several types of weight-loss surgery  Ask your healthcare provider for more information    Be successful losing weight:   · Set small, realistic goals  An example of a small goal is to walk for 20 minutes 5 days a week  Anther goal is to lose 5% of your body weight  · Tell friends, family members, and coworkers about your goals  and ask for their support  Ask a friend to lose weight with you, or join a weight-loss support group  · Identify foods or triggers that may cause you to overeat , and find ways to avoid them  Remove tempting high-calorie foods from your home and workplace  Place a bowl of fresh fruit on your kitchen counter  If stress causes you to eat, then find other ways to cope with stress  · Keep a diary to track what you eat and drink  Also write down how many minutes of physical activity you do each day  Weigh yourself once a week and record it in your diary  Eating changes: You will need to eat 500 to 1,000 fewer calories each day than you currently eat to lose 1 to 2 pounds a week  The following changes will help you cut calories:  · Eat smaller portions  Use small plates, no larger than 9 inches in diameter  Fill your plate half full of fruits and vegetables  Measure your food using measuring cups until you know what a serving size looks like  · Eat 3 meals and 1 or 2 snacks each day  Plan your meals in advance  [place]on and eat at home most of the time  Eat slowly  · Eat fruits and vegetables at every meal   They are low in calories and high in fiber, which makes you feel full  Do not add butter, margarine, or cream sauce to vegetables  Use herbs to season steamed vegetables  · Eat less fat and fewer fried foods  Eat more baked or grilled chicken and fish  These protein sources are lower in calories and fat than red meat  Limit fast food  Dress your salads with olive oil and vinegar instead of bottled dressing  · Limit the amount of sugar you eat  Do not drink sugary beverages  Limit alcohol  Activity changes:  Physical activity is good for your body in many ways   It helps you burn calories and build strong muscles  It decreases stress and depression, and improves your mood  It can also help you sleep better  Talk to your healthcare provider before you begin an exercise program   · Exercise for at least 30 minutes 5 days a week  Start slowly  Set aside time each day for physical activity that you enjoy and that is convenient for you  It is best to do both weight training and an activity that increases your heart rate, such as walking, bicycling, or swimming  · Find ways to be more active  Do yard work and housecleaning  Walk up the stairs instead of using elevators  Spend your leisure time going to events that require walking, such as outdoor festivals or fairs  This extra physical activity can help you lose weight and keep it off  Follow up with your healthcare provider as directed: You may need to meet with a dietitian  Write down your questions so you remember to ask them during your visits  © 2017 [address] Information is for End User's use only and may not be sold, redistributed or otherwise used for commercial purposes  All illustrations and images included in CareNotes® are the copyrighted property of [employer]  or [employer] Cow[employer]  The above information is an  only  It is not intended as medical advice for individual conditions or treatments  Talk to your doctor, nurse or pharmacist before following any medical regimen to see if it is safe and effective for you  Urinary Incontinence   WHAT YOU NEED TO KNOW:   What is urinary incontinence? Urinary incontinence (UI) is when you lose control of your bladder  What causes UI? UI occurs because your bladder cannot store or empty urine properly  The following are the most common types of UI:  · Stress incontinence  is when you leak urine due to increased bladder pressure  This may happen when you cough, sneeze, or exercise       · Urge incontinence  is when you feel the need to urinate right away and leak urine accidentally  · Mixed incontinence  is when you have both stress and urge UI  What are the signs and symptoms of UI?   · You feel like your bladder does not empty completely when you urinate  · You urinate often and need to urinate immediately  · You leak urine when you sleep, or you wake up with the urge to urinate  · You leak urine when you cough, sneeze, exercise, or laugh  How is UI diagnosed? Your healthcare provider will ask how often you leak urine and whether you have stress or urge symptoms  Tell him which medicines you take, how often you urinate, and how much liquid you drink each day  You may need any of the following tests:  · Urine tests  may show infection or kidney function  · A pelvic exam  may be done to check for blockages  A pelvic exam will also show if your bladder, uterus, or other organs have moved out of place  · An x-ray, ultrasound, or CT  may show problems with parts of your urinary system  You may be given contrast liquid to help your organs show up better in the pictures  Tell the healthcare provider if you have ever had an allergic reaction to contrast liquid  Do not enter the MRI room with anything metal  Metal can cause serious injury  Tell the healthcare provider if you have any metal in or on your body  · A bladder scan  will show how much urine is left in your bladder after you urinate  You will be asked to urinate and then healthcare providers will use a small ultrasound machine to check the urine left in your bladder  · Cystometry  is used to check the function of your urinary system  Your healthcare provider checks the pressure in your bladder while filling it with fluid  Your bladder pressure may also be tested when your bladder is full and while you urinate  How is UI treated? · Medicines  can help strengthen your bladder control      · Electrical stimulation  is used to send a small amount of electrical energy to your pelvic floor muscles  This helps control your bladder function  Electrodes may be placed outside your body or in your rectum  For women, the electrodes may be placed in the vagina  · A bulking agent  may be injected into the wall of your urethra to make it thicker  This helps keep your urethra closed and decreases urine leakage  · Devices  such as a clamp, pessary, or tampon may help stop urine leaks  Ask your healthcare provider for more information about these and other devices  · Surgery  may be needed if other treatments do not work  Several types of surgery can help improve your bladder control  Ask your healthcare provider for more information about the surgery you may need  How can I manage my symptoms? · Do pelvic muscle exercises often  Your pelvic muscles help you stop urinating  Squeeze these muscles tight for 5 seconds, then relax for 5 seconds  Gradually work up to squeezing for 10 seconds  Do 3 sets of 15 repetitions a day, or as directed  This will help strengthen your pelvic muscles and improve bladder control  · A catheter  may be used to help empty your bladder  A catheter is a tiny, plastic tube that is put into your bladder to drain your urine  Your healthcare provider may tell you to use a catheter to prevent your bladder from getting too full and leaking urine  · Keep a UI record  Write down how often you leak urine and how much you leak  Make a note of what you were doing when you leaked urine  · Train your bladder  Go to the bathroom at set times, such as every 2 hours, even if you do not feel the urge to go  You can also try to hold your urine when you feel the urge to go  For example, hold your urine for 5 minutes when you feel the urge to go  As that becomes easier, hold your urine for 10 minutes  · Drink liquids as directed  Ask your healthcare provider how much liquid to drink each day and which liquids are best for you   You may need to limit the amount of liquid you drink to help control your urine leakage  Limit or do not have drinks that contain caffeine or alcohol  Do not drink any liquid right before you go to bed  · Prevent constipation  Eat a variety of high-fiber foods  Good examples are high-fiber cereals, beans, vegetables, and whole-grain breads  Prune juice may help make your bowel movement softer  Walking is the best way to trigger your intestines to have a bowel movement  · Exercise regularly and maintain a healthy weight  Ask your healthcare provider how much you should weigh and about the best exercise plan for you  Weight loss and exercise will decrease pressure on your bladder and help you control your leakage  Ask him to help you create a weight loss plan if you are overweight  When should I seek immediate care? · You have severe pain  · You are confused or cannot think clearly  When should I contact my healthcare provider? · You have a fever  · You see blood in your urine  · You have pain when you urinate  · You have new or worse pain, even after treatment  · Your mouth feels dry or you have vision changes  · Your urine is cloudy or smells bad  · You have questions or concerns about your condition or care  CARE AGREEMENT:   You have the right to help plan your care  Learn about your health condition and how it may be treated  Discuss treatment options with your caregivers to decide what care you want to receive  You always have the right to refuse treatment  The above information is an  only  It is not intended as medical advice for individual conditions or treatments  Talk to your doctor, nurse or pharmacist before following any medical regimen to see if it is safe and effective for you  © 2017 [facility] Information is for End User's use only and may not be sold, redistributed or otherwise used for commercial purposes   All illustrations and images included in CareNotes® are the copyrighted property of [employer]  or [employer] Cowan  Cigarette Smoking and Your Health   AMBULATORY CARE:   Risks to your health if you smoke:  Nicotine and other chemicals found in tobacco damage every cell in your body  Even if you are a light smoker, you have an increased risk for cancer, heart disease, and lung disease  If you are pregnant or have diabetes, smoking increases your risk for complications  Benefits to your health if you stop smoking:   · You decrease respiratory symptoms such as coughing, wheezing, and shortness of breath  · You reduce your risk for cancers of the lung, mouth, throat, kidney, bladder, pancreas, stomach, and cervix  If you already have cancer, you increase the benefits of chemotherapy  You also reduce your risk for cancer returning or a second cancer from developing  · You reduce your risk for heart disease, blood clots, heart attack, and stroke  · You reduce your risk for lung infections, and diseases such as pneumonia, asthma, chronic bronchitis, and emphysema  · Your circulation improves  More oxygen can be delivered to your body  If you have diabetes, you lower your risk for complications, such as kidney, artery, and eye diseases  You also lower your risk for nerve damage  Nerve damage can lead to amputations, poor vision, and blindness  · You improve your body's ability to heal and to fight infections  Benefits to the health of others if you stop smoking:  Tobacco is harmful to nonsmokers who breathe in your secondhand smoke  The following are ways the health of others around you may improve when you stop smoking:  · You lower the risks for lung cancer and heart disease in nonsmoking adults  · If you are pregnant, you lower the risk for miscarriage, early delivery, low birth weight, and stillbirth  You also lower your baby's risk for SIDS, obesity, developmental delay, and neurobehavioral problems, such as ADHD  · If you have children, you lower their risk for ear infections, colds, pneumonia, bronchitis, and asthma  For more information and support to stop smoking:   · Smokefree  gov  Phone: 4- 267 - 795-4511  Web Address: www smokefree  gov  Follow up with your healthcare provider as directed:  Write down your questions so you remember to ask them during your visits  © 2017 [address] [employer] Information is for End User's use only and may not be sold, redistributed or otherwise used for commercial purposes  All illustrations and images included in CareNotes® are the copyrighted property of [employer]  or Cole Cowan  The above information is an  only  It is not intended as medical advice for individual conditions or treatments  Talk to your doctor, nurse or pharmacist before following any medical regimen to see if it is safe and effective for you  Fall Prevention   AMBULATORY CARE:   Fall prevention  includes ways to make your home and other areas safer  It also includes ways you can move more carefully to prevent a fall  Health conditions that cause changes in your blood pressure, vision, or muscle strength and coordination may increase your risk for falls  Medicines may also increase your risk for falls if they make you dizzy, weak, or sleepy  Call 911 or have someone else call if:   · You have fallen and are unconscious  · You have fallen and cannot move part of your body  Contact your healthcare provider if:   · You have fallen and have pain or a headache  · You have questions or concerns about your condition or care  Fall prevention tips:   · Stand or sit up slowly  This may help you keep your balance and prevent falls  · Use assistive devices as directed  Your healthcare provider may suggest that you use a cane or walker to help you keep your balance  You may need to have grab bars put in your bathroom near the toilet or in the shower      · Wear shoes that fit well and have soles that   Wear shoes both inside and outside  Use slippers with good   Do not wear shoes with high heels  · Wear a personal alarm  This is a device that allows you to call 911 if you fall and need help  Ask your healthcare provider for more information  · Stay active  Exercise can help strengthen your muscles and improve your balance  Your healthcare provider may recommend water aerobics or walking  He or she may also recommend physical therapy to improve your coordination  Never start an exercise program without talking to your healthcare provider first      · Manage your medical conditions  Keep all appointments with your healthcare providers  Visit your eye doctor as directed  Home safety tips:   · Add items to prevent falls in the bathroom  Put nonslip strips on your bath or shower floor to prevent you from slipping  Use a bath mat if you do not have carpet in the bathroom  This will prevent you from falling when you step out of the bath or shower  Use a shower seat so you do not need to stand while you shower  Sit on the toilet or a chair in your bathroom to dry yourself and put on clothing  This will prevent you from losing your balance from drying or dressing yourself while you are standing  · Keep paths clear  Remove books, shoes, and other objects from walkways and stairs  Place cords for telephones and lamps out of the way so that you do not need to walk over them  Tape them down if you cannot move them  Remove small rugs  If you cannot remove a rug, secure it with double-sided tape  This will prevent you from tripping  · Install bright lights in your home  Use night lights to help light paths to the bathroom or kitchen  Always turn on the light before you start walking  · Keep items you use often on shelves within reach  Do not use a step stool to help you reach an item  · Paint or place reflective tape on the edges of your stairs    This will help you see the stairs better  Follow up with your healthcare provider as directed:  Write down your questions so you remember to ask them during your visits  © 2017 [address] [employer] Information is for End User's use only and may not be sold, redistributed or otherwise used for commercial purposes  All illustrations and images included in CareNotes® are the copyrighted property of [employer]  or [employer] Cowan  The above information is an  only  It is not intended as medical advice for individual conditions or treatments  Talk to your doctor, nurse or pharmacist before following any medical regimen to see if it is safe and effective for you  Advance Directives   WHAT YOU NEED TO KNOW:   What are advance directives? Advance directives are legal documents that state your wishes and plans for medical care  These plans are made ahead of time in case you lose your ability to make decisions for yourself  Advance directives can apply to any medical decision, such as the treatments you want, and if you want to donate organs  What are the types of advance directives? There are many types of advance directives, and each state has rules about how to use them  You may choose a combination of any of the following:  · Living will: This is a written record of the treatment you want  You can also choose which treatments you do not want, which to limit, and which to stop at a certain time  This includes surgery, medicine, IV fluid, and tube feedings  · Durable power of  for healthcare [facility]): This is a written record that states who you want to make healthcare choices for you when you are unable to make them for yourself  This person, called a proxy, is usually a family member or a friend  You may choose more than 1 proxy  · Do not resuscitate (DNR) order:  A DNR order is used in case your heart stops beating or you stop breathing   It is a request not to have certain forms of treatment, such as CPR  A DNR order may be included in other types of advance directives  · Medical directive: This covers the care that you want if you are in a coma, near death, or unable to make decisions for yourself  You can list the treatments you want for each condition  Treatment may include pain medicine, surgery, blood transfusions, dialysis, IV or tube feedings, and a ventilator (breathing machine)  · Values history: This document has questions about your views, beliefs, and how you feel and think about life  This information can help others choose the care that you would choose  Why are advance directives important? An advance directive helps you control your care  Although spoken wishes may be used, it is better to have your wishes written down  Spoken wishes can be misunderstood, or not followed  Treatments may be given even if you do not want them  An advance directive may make it easier for your family to make difficult choices about your care  How do I decide what to put in my advance directives? · Make informed decisions:  Make sure you fully understand treatments or care you may receive  Think about the benefits and problems your decisions could cause for you or your family  Talk to healthcare providers if you have concerns or questions before you write down your wishes  You may also want to talk with your Catholic or , or a   Check your state laws to make sure that what you put in your advance directive is legal      · Sign all forms:  Sign and date your advance directive when you have finished  You may also need 2 witnesses to sign the forms  Witnesses cannot be your doctor or his staff, your spouse, heirs or beneficiaries, people you owe money to, or your chosen proxy  Talk to your family, proxy, and healthcare providers about your advance directive  Give each person a copy, and keep one for yourself in a place you can get to easily   Do not keep it hidden or locked away  · Review and revise your plans: You can revise your advance directive at any time, as long as you are able to make decisions  Review your plan every year, and when there are changes in your life, or your health  When you make changes, let your family, proxy, and healthcare providers know  Give each a new copy  Where can I find more information? · [employer]  [address] , [address]  Phone: 4- 414 - [phone]  Phone: 7- 636 - [phone]  Web Address: http://www  aafp org  · [address] [facility])  [address] [facility], [address] [facility] , [address] [place] [address]  Phone: [phone]  Phone: [phone]  Web Address: Shanae bernal  CARE AGREEMENT:   You have the right to help plan your care  To help with this plan, you must learn about your health condition and treatment options  You must also learn about advance directives and how they are used  Work with your healthcare providers to decide what care will be used to treat you  You always have the right to refuse treatment  The above information is an  only  It is not intended as medical advice for individual conditions or treatments  Talk to your doctor, nurse or pharmacist before following any medical regimen to see if it is safe and effective for you  © 2017 [address] Information is for End User's use only and may not be sold, redistributed or otherwise used for commercial purposes  All illustrations and images included in CareNotes® are the copyrighted property of [employer]  or Cole Cowan

## 2018-11-07 ENCOUNTER — TRANSCRIBE ORDERS (OUTPATIENT)
Dept: ADMINISTRATIVE | Facility: HOSPITAL | Age: 83
End: 2018-11-07

## 2018-11-07 DIAGNOSIS — Z12.31 VISIT FOR SCREENING MAMMOGRAM: Primary | ICD-10-CM

## 2018-11-15 ENCOUNTER — HOSPITAL ENCOUNTER (OUTPATIENT)
Dept: NON INVASIVE DIAGNOSTICS | Facility: CLINIC | Age: 83
Discharge: HOME | End: 2018-11-15
Payer: COMMERCIAL

## 2018-11-15 DIAGNOSIS — I65.22 CAROTID ARTERY OBSTRUCTION, LEFT: ICD-10-CM

## 2018-11-15 PROCEDURE — 93880 EXTRACRANIAL BILAT STUDY: CPT | Performed by: SURGERY

## 2018-11-15 PROCEDURE — 93880 EXTRACRANIAL BILAT STUDY: CPT

## 2018-12-12 DIAGNOSIS — K21.9 GERD WITHOUT ESOPHAGITIS: Primary | ICD-10-CM

## 2018-12-12 RX ORDER — OMEPRAZOLE 20 MG/1
CAPSULE, DELAYED RELEASE ORAL
Qty: 90 CAPSULE | Refills: 3 | Status: SHIPPED | OUTPATIENT
Start: 2018-12-12 | End: 2019-02-26 | Stop reason: SDUPTHER

## 2019-02-26 DIAGNOSIS — K21.9 GERD WITHOUT ESOPHAGITIS: ICD-10-CM

## 2019-02-26 DIAGNOSIS — I10 ESSENTIAL HYPERTENSION: ICD-10-CM

## 2019-02-26 DIAGNOSIS — I25.10 CAD IN NATIVE ARTERY: Primary | ICD-10-CM

## 2019-02-26 RX ORDER — TRIAMTERENE AND HYDROCHLOROTHIAZIDE 37.5; 25 MG/1; MG/1
1 TABLET ORAL DAILY
Qty: 90 TABLET | Refills: 3 | Status: SHIPPED | OUTPATIENT
Start: 2019-02-26 | End: 2020-02-16

## 2019-02-26 RX ORDER — OMEPRAZOLE 20 MG/1
20 CAPSULE, DELAYED RELEASE ORAL DAILY
Qty: 90 CAPSULE | Refills: 3 | Status: SHIPPED | OUTPATIENT
Start: 2019-02-26 | End: 2020-02-16

## 2019-02-26 RX ORDER — METOPROLOL TARTRATE 50 MG/1
100 TABLET, FILM COATED ORAL DAILY
Qty: 180 TABLET | Refills: 3 | Status: SHIPPED | OUTPATIENT
Start: 2019-02-26 | End: 2020-02-16

## 2019-02-26 NOTE — TELEPHONE ENCOUNTER
PLEASE REFILL THE FOLLOWING MEDICATIONS    TRIMTERENE HCTZ 37 5 1X DAILY    METOPROLOL TARTRATE 50 MG 2X DAILY    OMEPRAZOLE 20 MG DELAYED RELEASE 1X DAILY    ASPIRIN  81 MG 1X DAILY TO [facility] ON FILE

## 2019-03-22 ENCOUNTER — TELEPHONE (OUTPATIENT)
Dept: FAMILY MEDICINE CLINIC | Facility: CLINIC | Age: 84
End: 2019-03-22

## 2019-03-22 DIAGNOSIS — R30.0 DYSURIA: Primary | ICD-10-CM

## 2019-03-22 RX ORDER — CIPROFLOXACIN 500 MG/1
500 TABLET, FILM COATED ORAL EVERY 12 HOURS SCHEDULED
Qty: 10 TABLET | Refills: 0 | Status: SHIPPED | OUTPATIENT
Start: 2019-03-22 | End: 2019-03-27

## 2019-03-22 NOTE — TELEPHONE ENCOUNTER
Patient is patient of Dr Sophia Davis , Patient called and stated she has a bladder infection which she gets all the time  Symptoms: burning, an always has to go  She wanted to know if we can send something to the pharmacy   Please advised      Pharmacy is [facility]

## 2019-04-03 LAB
ALBUMIN SERPL-MCNC: 4.3 G/DL (ref 3.6–5.1)
ALBUMIN/GLOB SERPL: 1.5 (CALC) (ref 1–2.5)
ALP SERPL-CCNC: 86 U/L (ref 33–130)
ALT SERPL-CCNC: 32 U/L (ref 6–29)
AST SERPL-CCNC: 35 U/L (ref 10–35)
BILIRUB SERPL-MCNC: 0.6 MG/DL (ref 0.2–1.2)
BUN SERPL-MCNC: 29 MG/DL (ref 7–25)
BUN/CREAT SERPL: 22 (CALC) (ref 6–22)
CALCIUM SERPL-MCNC: 9.7 MG/DL (ref 8.6–10.4)
CHLORIDE SERPL-SCNC: 99 MMOL/L (ref 98–110)
CHOLEST SERPL-MCNC: 181 MG/DL
CHOLEST/HDLC SERPL: 3.3 (CALC)
CO2 SERPL-SCNC: 29 MMOL/L (ref 20–32)
CREAT SERPL-MCNC: 1.34 MG/DL (ref 0.6–0.88)
ERYTHROCYTE [DISTWIDTH] IN BLOOD BY AUTOMATED COUNT: 13.1 % (ref 11–15)
EST. AVERAGE GLUCOSE BLD GHB EST-MCNC: 143 (CALC)
EST. AVERAGE GLUCOSE BLD GHB EST-SCNC: 7.9 (CALC)
GLOBULIN SER CALC-MCNC: 2.9 G/DL (CALC) (ref 1.9–3.7)
GLUCOSE SERPL-MCNC: 133 MG/DL (ref 65–99)
HBA1C MFR BLD: 6.6 % OF TOTAL HGB
HCT VFR BLD AUTO: 41.7 % (ref 35–45)
HDLC SERPL-MCNC: 55 MG/DL
HGB BLD-MCNC: 14.4 G/DL (ref 11.7–15.5)
LDLC SERPL CALC-MCNC: 109 MG/DL (CALC)
MCH RBC QN AUTO: 32.2 PG (ref 27–33)
MCHC RBC AUTO-ENTMCNC: 34.5 G/DL (ref 32–36)
MCV RBC AUTO: 93.3 FL (ref 80–100)
NONHDLC SERPL-MCNC: 126 MG/DL (CALC)
PLATELET # BLD AUTO: 201 THOUSAND/UL (ref 140–400)
PMV BLD REES-ECKER: 9.9 FL (ref 7.5–12.5)
POTASSIUM SERPL-SCNC: 3.8 MMOL/L (ref 3.5–5.3)
PROT SERPL-MCNC: 7.2 G/DL (ref 6.1–8.1)
RBC # BLD AUTO: 4.47 MILLION/UL (ref 3.8–5.1)
SL AMB EGFR AFRICAN AMERICAN: 43 ML/MIN/1.73M2
SL AMB EGFR NON AFRICAN AMERICAN: 37 ML/MIN/1.73M2
SODIUM SERPL-SCNC: 137 MMOL/L (ref 135–146)
TRIGL SERPL-MCNC: 77 MG/DL
TSH SERPL-ACNC: 2.05 MIU/L (ref 0.4–4.5)
WBC # BLD AUTO: 4.9 THOUSAND/UL (ref 3.8–10.8)

## 2019-04-18 ENCOUNTER — OFFICE VISIT (OUTPATIENT)
Dept: CARDIOLOGY CLINIC | Facility: CLINIC | Age: 84
End: 2019-04-18
Payer: MEDICARE

## 2019-04-18 VITALS
HEART RATE: 62 BPM | BODY MASS INDEX: 25.95 KG/M2 | SYSTOLIC BLOOD PRESSURE: 98 MMHG | DIASTOLIC BLOOD PRESSURE: 68 MMHG | WEIGHT: 152 LBS | HEIGHT: 64 IN

## 2019-04-18 DIAGNOSIS — I65.22 CAROTID ARTERY OBSTRUCTION, LEFT: ICD-10-CM

## 2019-04-18 DIAGNOSIS — E78.2 MIXED HYPERLIPIDEMIA: ICD-10-CM

## 2019-04-18 DIAGNOSIS — I25.10 CORONARY ARTERY DISEASE INVOLVING NATIVE HEART WITHOUT ANGINA PECTORIS, UNSPECIFIED VESSEL OR LESION TYPE: Primary | ICD-10-CM

## 2019-04-18 DIAGNOSIS — I71.2 ANEURYSM OF ASCENDING AORTA (HCC): ICD-10-CM

## 2019-04-18 DIAGNOSIS — I10 ESSENTIAL HYPERTENSION: ICD-10-CM

## 2019-04-18 PROCEDURE — 99214 OFFICE O/P EST MOD 30 MIN: CPT | Performed by: INTERNAL MEDICINE

## 2019-04-18 PROCEDURE — 93000 ELECTROCARDIOGRAM COMPLETE: CPT | Performed by: INTERNAL MEDICINE

## 2019-04-18 RX ORDER — ATORVASTATIN CALCIUM 10 MG/1
10 TABLET, FILM COATED ORAL DAILY
Qty: 30 TABLET | Refills: 11 | Status: SHIPPED | OUTPATIENT
Start: 2019-04-18 | End: 2020-03-23 | Stop reason: SDUPTHER

## 2019-04-24 ENCOUNTER — OFFICE VISIT (OUTPATIENT)
Dept: FAMILY MEDICINE CLINIC | Facility: CLINIC | Age: 84
End: 2019-04-24
Payer: MEDICARE

## 2019-04-24 VITALS
DIASTOLIC BLOOD PRESSURE: 70 MMHG | TEMPERATURE: 97.7 F | SYSTOLIC BLOOD PRESSURE: 108 MMHG | HEIGHT: 64 IN | RESPIRATION RATE: 16 BRPM | BODY MASS INDEX: 25.78 KG/M2 | WEIGHT: 151 LBS | HEART RATE: 69 BPM | OXYGEN SATURATION: 97 %

## 2019-04-24 DIAGNOSIS — I25.10 CORONARY ARTERY DISEASE INVOLVING NATIVE CORONARY ARTERY OF NATIVE HEART WITHOUT ANGINA PECTORIS: ICD-10-CM

## 2019-04-24 DIAGNOSIS — N18.30 CHRONIC KIDNEY DISEASE, STAGE 3 (HCC): ICD-10-CM

## 2019-04-24 DIAGNOSIS — I65.22 CAROTID ARTERY OBSTRUCTION, LEFT: ICD-10-CM

## 2019-04-24 DIAGNOSIS — R73.01 IMPAIRED FASTING GLUCOSE: ICD-10-CM

## 2019-04-24 DIAGNOSIS — I10 ESSENTIAL HYPERTENSION: Primary | ICD-10-CM

## 2019-04-24 DIAGNOSIS — E78.2 MIXED HYPERLIPIDEMIA: ICD-10-CM

## 2019-04-24 DIAGNOSIS — I71.2 THORACIC AORTIC ANEURYSM WITHOUT RUPTURE (HCC): ICD-10-CM

## 2019-04-24 PROCEDURE — 99214 OFFICE O/P EST MOD 30 MIN: CPT | Performed by: FAMILY MEDICINE

## 2019-05-23 ENCOUNTER — TRANSCRIBE ORDERS (OUTPATIENT)
Dept: RADIOLOGY | Facility: HOSPITAL | Age: 84
End: 2019-05-23

## 2019-05-23 ENCOUNTER — HOSPITAL ENCOUNTER (OUTPATIENT)
Dept: RADIOLOGY | Facility: HOSPITAL | Age: 84
Discharge: HOME | End: 2019-05-23
Payer: MEDICARE

## 2019-05-23 ENCOUNTER — OFFICE VISIT (OUTPATIENT)
Dept: FAMILY MEDICINE CLINIC | Facility: CLINIC | Age: 84
End: 2019-05-23
Payer: MEDICARE

## 2019-05-23 VITALS
HEIGHT: 64 IN | DIASTOLIC BLOOD PRESSURE: 60 MMHG | RESPIRATION RATE: 12 BRPM | BODY MASS INDEX: 25.3 KG/M2 | SYSTOLIC BLOOD PRESSURE: 126 MMHG | WEIGHT: 148.2 LBS | HEART RATE: 77 BPM | OXYGEN SATURATION: 95 %

## 2019-05-23 DIAGNOSIS — M25.552 HIP PAIN, ACUTE, LEFT: ICD-10-CM
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
## 2019-05-23 DIAGNOSIS — M54.50 ACUTE LEFT-SIDED LOW BACK PAIN WITHOUT SCIATICA: ICD-10-CM
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
## 2019-05-23 PROCEDURE — 72110 X-RAY EXAM L-2 SPINE 4/>VWS: CPT

## 2019-05-23 PROCEDURE — 99213 OFFICE O/P EST LOW 20 MIN: CPT | Performed by: NURSE PRACTITIONER

## 2019-05-23 PROCEDURE — 73502 X-RAY EXAM HIP UNI 2-3 VIEWS: CPT

## 2019-06-17 ENCOUNTER — HOSPITAL ENCOUNTER (OUTPATIENT)
Dept: RADIOLOGY | Facility: HOSPITAL | Age: 84
Discharge: HOME | End: 2019-06-17
Payer: MEDICARE

## 2019-06-17 VITALS — HEIGHT: 64 IN | BODY MASS INDEX: 25.27 KG/M2 | WEIGHT: 148 LBS

## 2019-06-17 DIAGNOSIS — Z12.31 VISIT FOR SCREENING MAMMOGRAM: ICD-10-CM

## 2019-06-17 PROCEDURE — 77067 SCR MAMMO BI INCL CAD: CPT

## 2019-08-21 ENCOUNTER — OFFICE VISIT (OUTPATIENT)
Dept: FAMILY MEDICINE CLINIC | Facility: CLINIC | Age: 84
End: 2019-08-21
Payer: MEDICARE

## 2019-08-21 VITALS
RESPIRATION RATE: 14 BRPM | HEART RATE: 60 BPM | SYSTOLIC BLOOD PRESSURE: 112 MMHG | OXYGEN SATURATION: 97 % | WEIGHT: 147.4 LBS | BODY MASS INDEX: 25.16 KG/M2 | TEMPERATURE: 97.1 F | DIASTOLIC BLOOD PRESSURE: 72 MMHG | HEIGHT: 64 IN

## 2019-08-21 DIAGNOSIS — N30.01 ACUTE CYSTITIS WITH HEMATURIA: Primary | ICD-10-CM

## 2019-08-21 PROCEDURE — 87077 CULTURE AEROBIC IDENTIFY: CPT | Performed by: FAMILY MEDICINE

## 2019-08-21 PROCEDURE — 87186 SC STD MICRODIL/AGAR DIL: CPT | Performed by: FAMILY MEDICINE

## 2019-08-21 PROCEDURE — 99213 OFFICE O/P EST LOW 20 MIN: CPT | Performed by: FAMILY MEDICINE

## 2019-08-21 PROCEDURE — 87086 URINE CULTURE/COLONY COUNT: CPT | Performed by: FAMILY MEDICINE

## 2019-08-21 RX ORDER — CIPROFLOXACIN 500 MG/1
500 TABLET, FILM COATED ORAL EVERY 12 HOURS SCHEDULED
Qty: 14 TABLET | Refills: 0 | Status: SHIPPED | OUTPATIENT
Start: 2019-08-21 | End: 2019-08-29 | Stop reason: ALTCHOICE

## 2019-08-21 NOTE — PROGRESS NOTES
Assessment/Plan:    No problem-specific Assessment & Plan notes found for this encounter  Diagnoses and all orders for this visit:    Acute cystitis with hematuria  Comments:  Anshul Higgins is stable on exam   Suspected UTI -> will treat with Cipro (with Urine Cx pending), and good PO hydration  Orders:  -     ciprofloxacin (CIPRO) 500 mg tablet; Take 1 tablet (500 mg total) by mouth every 12 (twelve) hours for 7 days  -     Cancel: UA w Reflex to Microscopic w Reflex to Culture - Clinic Collect  -     Cancel: Urine culture; Future  -     Urine culture; Future  -     Urine culture          Subjective:      Patient ID: Brittanie Kincaid is a 81 y o  female  Anshul Higgins has had a UTI several months ago -> came on again this AM    +Dysuria and frequency  Urine Dip c/w likely UTI -> formal Urine Cx is pending  Last GFR at 37 in 04/2019        The following portions of the patient's history were reviewed and updated as appropriate: allergies, current medications, past family history, past social history, past surgical history and problem list     Past Medical History:   Diagnosis Date    Aorta aneurysm ([facility] [address] )     Cardiac disease     GERD (gastroesophageal reflux disease)     Hematuria     last assessed: 10/28/2013    Shortness of breath     last assessed: 6/27/2013         Current Outpatient Medications:     atorvastatin (LIPITOR) 10 mg tablet, Take 1 tablet (10 mg total) by mouth daily, Disp: 30 tablet, Rfl: 11    metoprolol tartrate (LOPRESSOR) 50 mg tablet, Take 2 tablets (100 mg total) by mouth daily, Disp: 180 tablet, Rfl: 3    omeprazole (PriLOSEC) 20 mg delayed release capsule, Take 1 capsule (20 mg total) by mouth daily, Disp: 90 capsule, Rfl: 3    triamterene-hydrochlorothiazide (MAXZIDE-25) 37 5-25 mg per tablet, Take 1 tablet by mouth daily, Disp: 90 tablet, Rfl: 3    ciprofloxacin (CIPRO) 500 mg tablet, Take 1 tablet (500 mg total) by mouth every 12 (twelve) hours for 7 days, Disp: 14 tablet, Rfl: 0    No Known Allergies      Review of Systems   Constitutional: Negative for activity change and fever  Gastrointestinal: Negative for abdominal pain  Genitourinary: Positive for dysuria and frequency  Negative for hematuria  Objective:      /72 (BP Location: Left arm, Patient Position: Sitting, Cuff Size: Standard)   Pulse 60   Temp (!) 97 1 °F (36 2 °C) (Tympanic)   Resp 14   Ht 5' 4" (1 626 m)   Wt 66 9 kg (147 lb 6 4 oz)   SpO2 97%   BMI 25 30 kg/m²          Physical Exam   Constitutional: She is oriented to person, place, and time  She appears well-developed and well-nourished  No distress  HENT:   Head: Normocephalic and atraumatic  Abdominal: Soft  She exhibits no distension and no mass  There is no tenderness  There is no rigidity, no rebound, no guarding and no CVA tenderness  Neurological: She is alert and oriented to person, place, and time  Skin: She is not diaphoretic  Psychiatric: She has a normal mood and affect  Her behavior is normal  Judgment and thought content normal    Nursing note and vitals reviewed

## 2019-08-23 LAB — BACTERIA UR CULT: ABNORMAL

## 2019-08-27 LAB
ALBUMIN SERPL-MCNC: 4.2 G/DL (ref 3.6–5.1)
ALBUMIN/GLOB SERPL: 1.4 (CALC) (ref 1–2.5)
ALP SERPL-CCNC: 96 U/L (ref 33–130)
ALT SERPL-CCNC: 26 U/L (ref 6–29)
AST SERPL-CCNC: 30 U/L (ref 10–35)
BILIRUB DIRECT SERPL-MCNC: 0.1 MG/DL
BILIRUB INDIRECT SERPL-MCNC: 0.5 MG/DL (CALC) (ref 0.2–1.2)
BILIRUB SERPL-MCNC: 0.6 MG/DL (ref 0.2–1.2)
CHOLEST SERPL-MCNC: 125 MG/DL
CHOLEST/HDLC SERPL: 2.2 (CALC)
GLOBULIN SER CALC-MCNC: 2.9 G/DL (CALC) (ref 1.9–3.7)
HDLC SERPL-MCNC: 56 MG/DL
LDLC SERPL CALC-MCNC: 55 MG/DL (CALC)
NONHDLC SERPL-MCNC: 69 MG/DL (CALC)
PROT SERPL-MCNC: 7.1 G/DL (ref 6.1–8.1)
TRIGL SERPL-MCNC: 62 MG/DL

## 2019-08-29 ENCOUNTER — OFFICE VISIT (OUTPATIENT)
Dept: FAMILY MEDICINE CLINIC | Facility: CLINIC | Age: 84
End: 2019-08-29
Payer: MEDICARE

## 2019-08-29 VITALS
BODY MASS INDEX: 25.33 KG/M2 | SYSTOLIC BLOOD PRESSURE: 110 MMHG | HEIGHT: 64 IN | OXYGEN SATURATION: 95 % | DIASTOLIC BLOOD PRESSURE: 70 MMHG | TEMPERATURE: 97 F | WEIGHT: 148.4 LBS | RESPIRATION RATE: 14 BRPM | HEART RATE: 62 BPM

## 2019-08-29 DIAGNOSIS — M54.50 CHRONIC LEFT-SIDED LOW BACK PAIN WITHOUT SCIATICA: Primary | ICD-10-CM

## 2019-08-29 DIAGNOSIS — G89.29 CHRONIC LEFT-SIDED LOW BACK PAIN WITHOUT SCIATICA: Primary | ICD-10-CM

## 2019-08-29 PROBLEM — M25.552 HIP PAIN, ACUTE, LEFT: Status: RESOLVED | Noted: 2019-05-23 | Resolved: 2019-08-29
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
## 2019-08-29 PROCEDURE — 99213 OFFICE O/P EST LOW 20 MIN: CPT | Performed by: FAMILY MEDICINE

## 2019-08-29 NOTE — PROGRESS NOTES
Assessment/Plan:    Problem List Items Addressed This Visit        Other    Chronic left-sided low back pain without sciatica - Primary     Will start PT         Relevant Orders    Ambulatory referral to Physical Therapy          BMI Counseling: Body mass index is 25 47 kg/m²  Discussed the patient's BMI with her  The BMI is above average  BMI counseling and education was provided to the patient  Nutrition recommendations include reducing portion sizes and 3-5 servings of fruits/vegetables daily  Exercise recommendations include exercising 3-5 times per week  There are no Patient Instructions on file for this visit  No follow-ups on file  Subjective:      Patient ID: Markham Phalen is a 81 y o  female  Chief Complaint   Patient presents with    Leg Pain     Patient here with leg pain top of buttock going down the leg knee down feels like pins        Has fallen 3 times- March was most recent very lightheaded and fell right onto hip  Twice fell onto left side  Having difficulty walking  Standing and cooking for hours  Unable to lay on that side  Starts in left mid buttock and travels down leg    Back Pain   This is a chronic problem  The current episode started more than 1 year ago  The problem occurs constantly  The pain is present in the gluteal  The pain radiates to the left thigh  The pain is at a severity of 6/10  The pain is moderate  The pain is worse during the night  She has tried nothing for the symptoms  The treatment provided no relief  The following portions of the patient's history were reviewed and updated as appropriate:  past social history    Review of Systems   Constitutional: Negative  HENT: Negative  Eyes: Negative  Respiratory: Negative  Cardiovascular: Negative  Gastrointestinal: Negative  Endocrine: Negative  Genitourinary: Negative  Musculoskeletal: Positive for back pain  Allergic/Immunologic: Negative  Neurological: Negative  Hematological: Negative  Psychiatric/Behavioral: Negative  Current Outpatient Medications   Medication Sig Dispense Refill    atorvastatin (LIPITOR) 10 mg tablet Take 1 tablet (10 mg total) by mouth daily 30 tablet 11    metoprolol tartrate (LOPRESSOR) 50 mg tablet Take 2 tablets (100 mg total) by mouth daily 180 tablet 3    omeprazole (PriLOSEC) 20 mg delayed release capsule Take 1 capsule (20 mg total) by mouth daily 90 capsule 3    triamterene-hydrochlorothiazide (MAXZIDE-25) 37 5-25 mg per tablet Take 1 tablet by mouth daily 90 tablet 3     No current facility-administered medications for this visit  Objective:    /70   Pulse 62   Temp (!) 97 °F (36 1 °C)   Resp 14   Ht 5' 4" (1 626 m)   Wt 67 3 kg (148 lb 6 4 oz)   SpO2 95%   BMI 25 47 kg/m²        Physical Exam   Constitutional: She appears well-developed and well-nourished  HENT:   Head: Normocephalic and atraumatic  Eyes: Pupils are equal, round, and reactive to light  Neck: Normal range of motion  Neck supple  Cardiovascular: Normal rate, regular rhythm, normal heart sounds and intact distal pulses  Pulmonary/Chest: Effort normal and breath sounds normal    Abdominal: Soft  Bowel sounds are normal    Musculoskeletal: She exhibits tenderness (pain at left gluteal area, dec ROm)  Neurological: She is alert  Nursing note and vitals reviewed               Marissa Adam DO

## 2019-09-09 ENCOUNTER — EVALUATION (OUTPATIENT)
Dept: PHYSICAL THERAPY | Facility: CLINIC | Age: 84
End: 2019-09-09
Payer: MEDICARE

## 2019-09-09 DIAGNOSIS — M54.50 CHRONIC LEFT-SIDED LOW BACK PAIN WITHOUT SCIATICA: Primary | ICD-10-CM

## 2019-09-09 DIAGNOSIS — G89.29 CHRONIC LEFT-SIDED LOW BACK PAIN WITHOUT SCIATICA: Primary | ICD-10-CM

## 2019-09-09 PROCEDURE — 97110 THERAPEUTIC EXERCISES: CPT | Performed by: PHYSICAL THERAPIST

## 2019-09-09 PROCEDURE — 97161 PT EVAL LOW COMPLEX 20 MIN: CPT | Performed by: PHYSICAL THERAPIST

## 2019-09-09 NOTE — PROGRESS NOTES
PT Evaluation     Today's date: 2019  Patient name: Kirby Landon  : 1935  MRN: [id number]  Referring provider: Duane Medicine, DO  Dx:   Encounter Diagnosis     ICD-10-CM    1  Chronic left-sided low back pain without sciatica M54 5 Ambulatory referral to Physical Therapy    G89 29                   Assessment  Assessment details: Kirby Landon is a 81 y o  Female who presents with chronic LBP  Pt has decreased lumbar ROM as well as decreased B LE strength  Pt currently has difficulty walking/standing >5 minutes, difficulty negotiating, interrupted sleep 3x/night, and is unable to return to recreational walking  Pt would benefit from skilled PT to address the above impairments and to return to pain free function  Impairments: impaired physical strength, lacks appropriate home exercise program and pain with function  Functional limitations: difficulty walking/standing >5 minutes, difficulty negotiating, interrupted sleep 3x/night, unable to return to recreational walking  Symptom irritability: highUnderstanding of Dx/Px/POC: good   Prognosis: good    Goals  1  Pt will be independent with HEP upon DC  2  Pt's L LE strength will increase by at least 1 MMT to allow for safe ambulation  3  Pt's pain will be no more than 3/10 to allow for uninterrupted sleep  4  Pt will report being able to ambulate up to 10 minutes at a time  Plan  Patient would benefit from: skilled physical therapy  Planned therapy interventions: manual therapy, neuromuscular re-education, therapeutic exercise and therapeutic activities  Frequency: 2x week  Duration in visits: 12  Duration in weeks: 6  Treatment plan discussed with: patient        Subjective Evaluation    History of Present Illness  Date of onset: 3/9/2019  Mechanism of injury: Pt reports that she had a fall in March which resulted in LBP and sciatica in L leg  X-rays were unremarkable  Pt denies MRI or injections  Pt presents to OP PT            Not a recurrent problem   Quality of life: good    Pain  Current pain ratin  At best pain ratin  At worst pain rating: 10  Location: L LE  Quality: needle-like and sharp  Relieving factors: rest and relaxation  Aggravating factors: walking and standing  Progression: worsening      Diagnostic Tests  X-ray: normal  Treatments  No previous or current treatments  Patient Goals  Patient goals for therapy: decreased pain  Patient goal: to return to walking        Objective     Active Range of Motion     Lumbar   Flexion:  WFL  Extension:  Restriction level: moderate  Left lateral flexion:  WFL  Right lateral flexion:  WFL  Left rotation:  Restriction level: minimal  Right rotation:  Restriction level: minimal    Strength/Myotome Testing     Left Hip   Planes of Motion   Flexion: 3  Extension: 3  Abduction: 3    Right Hip   Planes of Motion   Flexion: 4-  Extension: 4-  Abduction: 4-    General Comments:      Lumbar Comments  (-) TTP             Precautions: none      Manual                                                                                   Exercise Diary              Bike             SKTC 10x10"            LTR 5"x15            L mod piriformis stretch 3x30"            Supine hip flexor stretch             PPT with ball squeeze             Supine SLR             Bridging             Seated woodchops             Seated alt UE/LE             Standing SLR 3 ways

## 2019-09-16 ENCOUNTER — OFFICE VISIT (OUTPATIENT)
Dept: PHYSICAL THERAPY | Facility: CLINIC | Age: 84
End: 2019-09-16
Payer: MEDICARE

## 2019-09-16 DIAGNOSIS — G89.29 CHRONIC LEFT-SIDED LOW BACK PAIN WITHOUT SCIATICA: Primary | ICD-10-CM

## 2019-09-16 DIAGNOSIS — M54.50 CHRONIC LEFT-SIDED LOW BACK PAIN WITHOUT SCIATICA: Primary | ICD-10-CM

## 2019-09-16 PROCEDURE — 97110 THERAPEUTIC EXERCISES: CPT

## 2019-09-16 PROCEDURE — 97112 NEUROMUSCULAR REEDUCATION: CPT

## 2019-09-16 NOTE — PROGRESS NOTES
Daily Note     Today's date: 2019  Patient name: Nithya Gilbert  : 1935  MRN: [id number]  Referring provider: Tim Hahn DO  Dx:   Encounter Diagnosis     ICD-10-CM    1  Chronic left-sided low back pain without sciatica M54 5     G89 29                   Subjective: Pt reports she was cooking for 3 days straight at [employer] last week and was in severe pain  Notes she has been sleeping poorly "for months " Reports this morning is the best she has felt since she fell       Objective: See treatment diary below      Assessment: Reviewed/performed HEP  Able to bonnei new exercises, although PPT w/ball squeeze was quite challenging for pt initially, required tactile cueing  Issued updated written HEP instructions, reviewed same w/pt  Will monitor  Patient would benefit from continued PT      Plan: Continue per plan of care        Precautions: none      Manual                                                                                   Exercise Diary             Bike  5'           SKTC 10x10" 10x  10"           LTR 5"x15 5"x15           L mod piriformis stretch 3x30" 3x30"           Supine hip flexor stretch  2x30"           PPT with ball squeeze  5"x10           Supine SLR  x10ea           Bridging  x10           Seated woodchops             Seated alt UE/LE             Standing SLR 3 ways

## 2019-09-18 ENCOUNTER — OFFICE VISIT (OUTPATIENT)
Dept: PHYSICAL THERAPY | Facility: CLINIC | Age: 84
End: 2019-09-18
Payer: MEDICARE

## 2019-09-18 DIAGNOSIS — M54.50 CHRONIC LEFT-SIDED LOW BACK PAIN WITHOUT SCIATICA: Primary | ICD-10-CM

## 2019-09-18 DIAGNOSIS — G89.29 CHRONIC LEFT-SIDED LOW BACK PAIN WITHOUT SCIATICA: Primary | ICD-10-CM

## 2019-09-18 PROCEDURE — 97110 THERAPEUTIC EXERCISES: CPT

## 2019-09-18 PROCEDURE — 97112 NEUROMUSCULAR REEDUCATION: CPT

## 2019-09-18 NOTE — PROGRESS NOTES
Daily Note     Today's date: 2019  Patient name: Jia Cardona  : 1935  MRN: [id number]  Referring provider: Denisse Frankel DO  Dx:   Encounter Diagnosis     ICD-10-CM    1  Chronic left-sided low back pain without sciatica M54 5     G89 29                   Subjective: Pt reports she had good relief after LV, and was able to sleep through the night  Notes she woke yesterday feeling good, which lasted for a few hours  Pt then was going up/down stairs to do the laundry, which cause increased LBP, "it was pretty high, I could hardly walk "  Today reports pain level is 5/10, upon arrival to therapy  Objective: See treatment diary below      Assessment: Performed new exercises and exercise progressions w/o increasing symptoms  PPT w/ball squeeze remains a bit challenging for pt initially, required verbal and tactile cueing  Relief after tx, L LB pain level 1-2/10  Will monitor  Patient would benefit from continued PT      Plan: Continue per plan of care        Precautions: none      Manual                                                                                   Exercise Diary            Bike  5' 5'          SKTC 10x10" 10x  10" 10x10"          LTR 5"x15 5"x15 5"x15          L mod piriformis stretch 3x30" 3x30" 3x30"          Supine hip flexor stretch  2x30" 2x30"          PPT with ball squeeze  5"x10 5"x10          Supine SLR  x10ea x15          Bridging  x10 x15          Seated woodchops             Seated alt UE/LE             Standing SLR 3 ways   x15ea  B/L

## 2019-09-23 ENCOUNTER — OFFICE VISIT (OUTPATIENT)
Dept: PHYSICAL THERAPY | Facility: CLINIC | Age: 84
End: 2019-09-23
Payer: MEDICARE

## 2019-09-23 DIAGNOSIS — G89.29 CHRONIC LEFT-SIDED LOW BACK PAIN WITHOUT SCIATICA: Primary | ICD-10-CM

## 2019-09-23 DIAGNOSIS — M54.50 CHRONIC LEFT-SIDED LOW BACK PAIN WITHOUT SCIATICA: Primary | ICD-10-CM

## 2019-09-23 PROCEDURE — 97112 NEUROMUSCULAR REEDUCATION: CPT

## 2019-09-23 PROCEDURE — 97110 THERAPEUTIC EXERCISES: CPT

## 2019-09-23 NOTE — PROGRESS NOTES
Daily Note     Today's date: 2019  Patient name: Rhonda Lara  : 1935  MRN: [id number]  Referring provider: Aarti Cardoza DO  Dx:   Encounter Diagnosis     ICD-10-CM    1  Chronic left-sided low back pain without sciatica M54 5     G89 29                   Subjective: Pt reports she walked around a store on Saturday, which caused increased LBP  Notes LBP upon arrival to therapy is 7-8/10  Objective: See treatment diary below      Assessment: Performed new exercises and exercise progressions w/o difficulty or discomfort  Cont to require verbal and tactile cueing during PPT w/ball squeeze  Relief after tx, L LB pain level 5/10  Symptoms easily aggravated  Will monitor  Patient would benefit from continued PT      Plan: Continue per plan of care        Precautions: none      Manual                                                                                   Exercise Diary           Bike  5' 5' 6'         SKTC 10x10" 10x  10" 10x10" 10x10"         LTR 5"x15 5"x15 5"x15 5"x15         L mod piriformis stretch 3x30" 3x30" 3x30" 3x30"         Supine hip flexor stretch  2x30" 2x30" 2x30"         PPT with ball squeeze  5"x10 5"x10 5"x15         Supine SLR  x10ea x15 x15         Bridging  x10 x15 x15         Seated woodchops    x10ea         Seated alt UE/LE    x10ea         Standing SLR 3 ways   x15ea  B/L x15ea  B/L

## 2019-09-25 ENCOUNTER — OFFICE VISIT (OUTPATIENT)
Dept: PHYSICAL THERAPY | Facility: CLINIC | Age: 84
End: 2019-09-25
Payer: MEDICARE

## 2019-09-25 DIAGNOSIS — G89.29 CHRONIC LEFT-SIDED LOW BACK PAIN WITHOUT SCIATICA: Primary | ICD-10-CM

## 2019-09-25 DIAGNOSIS — M54.50 CHRONIC LEFT-SIDED LOW BACK PAIN WITHOUT SCIATICA: Primary | ICD-10-CM

## 2019-09-25 PROCEDURE — 97112 NEUROMUSCULAR REEDUCATION: CPT

## 2019-09-25 PROCEDURE — 97110 THERAPEUTIC EXERCISES: CPT

## 2019-09-25 NOTE — PROGRESS NOTES
Daily Note     Today's date: 2019  Patient name: Nuris Pascual  : 1935  MRN: [id number]  Referring provider: Ildefonso Jones DO  Dx:   Encounter Diagnosis     ICD-10-CM    1  Chronic left-sided low back pain without sciatica M54 5     G89 29                   Subjective: Pt reports she was hurting all over after LV  Notes LBP upon arrival to therapy is 8/10  Objective: See treatment diary below      Assessment: Performed modified ex program as below  Mild discomfort during L supine hip flexor stretch  Cont to require verbal and tactile cueing during PPT w/ball squeeze  Relief after tx, L LB pain level 3/10, R LB pain level 0/10  Symptoms cont to be easily aggravated  Will monitor  Patient would benefit from continued PT      Plan: Continue per plan of care        Precautions: none      Manual                                                                                   Exercise Diary          Bike  5' 5' 6' 6'        SKTC 10x10" 10x  10" 10x10" 10x10" 10x10"        LTR 5"x15 5"x15 5"x15 5"x15 5"x15        L mod piriformis stretch 3x30" 3x30" 3x30" 3x30" 3x30"        Supine hip flexor stretch  2x30" 2x30" 2x30" 2x30" hold       PPT with ball squeeze  5"x10 5"x10 5"x15 5"x15        Supine SLR  x10ea x15 x15 x15        Bridging  x10 x15 x15 x15        Seated woodchops    x10ea NP        Seated alt UE/LE    x10ea NP        Standing SLR 3 ways   x15ea  B/L x15ea  B/L NP

## 2019-09-30 ENCOUNTER — OFFICE VISIT (OUTPATIENT)
Dept: PHYSICAL THERAPY | Facility: CLINIC | Age: 84
End: 2019-09-30
Payer: MEDICARE

## 2019-09-30 DIAGNOSIS — M54.50 CHRONIC LEFT-SIDED LOW BACK PAIN WITHOUT SCIATICA: Primary | ICD-10-CM

## 2019-09-30 DIAGNOSIS — G89.29 CHRONIC LEFT-SIDED LOW BACK PAIN WITHOUT SCIATICA: Primary | ICD-10-CM

## 2019-09-30 PROCEDURE — 97110 THERAPEUTIC EXERCISES: CPT | Performed by: PHYSICAL THERAPIST

## 2019-09-30 PROCEDURE — 97140 MANUAL THERAPY 1/> REGIONS: CPT | Performed by: PHYSICAL THERAPIST

## 2019-09-30 NOTE — PROGRESS NOTES
Daily Note     Today's date: 2019  Patient name: Concepcion Richardson  : 1935  MRN: [id number]  Referring provider: Zuhair Samayoa DO  Dx:   Encounter Diagnosis     ICD-10-CM    1  Chronic left-sided low back pain without sciatica M54 5     G89 29                   Subjective: "This was helping but lately I've been flared up "      Objective: See treatment diary below      Assessment: Pt had no complaints of pain t/o session  Able to complete all exercises without difficulty  Added in trigger point to paraspinals with good tolerance  Plan: Continue per plan of care        Precautions: none          Exercise Diary         Bike  5' 5' 6' 6' 6'       SKTC 10x10" 10x  10" 10x10" 10x10" 10x10" 10x10"       LTR 5"x15 5"x15 5"x15 5"x15 5"x15 5"x15       L mod piriformis stretch 3x30" 3x30" 3x30" 3x30" 3x30" 3x30"       Supine hip flexor stretch  2x30" 2x30" 2x30" 2x30" hold       PPT with ball squeeze  5"x10 5"x10 5"x15 5"x15 5"x15       Supine SLR  x10ea x15 x15 x15 x15       Bridging  x10 x15 x15 x15 x15       Prone hip ext      x15       Seated woodchops    x10ea NP x15ea       Seated alt UE/LE    x10ea NP        Standing SLR 3 ways   x15ea  B/L x15ea  B/L NP x15 ea                    Trigger point to L lumbar parapsinals      8'

## 2019-10-02 ENCOUNTER — OFFICE VISIT (OUTPATIENT)
Dept: PHYSICAL THERAPY | Facility: CLINIC | Age: 84
End: 2019-10-02
Payer: MEDICARE

## 2019-10-02 DIAGNOSIS — G89.29 CHRONIC LEFT-SIDED LOW BACK PAIN WITHOUT SCIATICA: Primary | ICD-10-CM

## 2019-10-02 DIAGNOSIS — M54.50 CHRONIC LEFT-SIDED LOW BACK PAIN WITHOUT SCIATICA: Primary | ICD-10-CM

## 2019-10-02 LAB
EST. AVERAGE GLUCOSE BLD GHB EST-MCNC: 143 (CALC)
EST. AVERAGE GLUCOSE BLD GHB EST-SCNC: 7.9 (CALC)
HBA1C MFR BLD: 6.6 % OF TOTAL HGB

## 2019-10-02 PROCEDURE — 97140 MANUAL THERAPY 1/> REGIONS: CPT

## 2019-10-02 PROCEDURE — 97110 THERAPEUTIC EXERCISES: CPT

## 2019-10-02 NOTE — PROGRESS NOTES
Daily Note     Today's date: 10/2/2019  Patient name: Jeffery Shahid  : 1935  MRN: [id number]  Referring provider: Riya Samuels DO  Dx:   Encounter Diagnosis     ICD-10-CM    1  Chronic left-sided low back pain without sciatica M54 5     G89 29                   Subjective: Pt reports she felt "really good" after LV  Notes her L LB feels good this morning  Objective: See treatment diary below      Assessment: Performed exercise program w/o difficulty or discomfort  Required vc'ing t/o same for technique and counting reps  Responded well to TPM to L lumbar paraspinals and SIJ  Will monitor  Plan: Continue per plan of care        Precautions: none          Exercise Diary  9/9 9/16 9/18 9/23 9/25 9/30 10/2      Bike  5' 5' 6' 6' 6' 6'      SKTC 10x10" 10x  10" 10x10" 10x10" 10x10" 10x10" 10x10"      LTR 5"x15 5"x15 5"x15 5"x15 5"x15 5"x15 5"x15      L mod piriformis stretch 3x30" 3x30" 3x30" 3x30" 3x30" 3x30" 3x30'      Supine hip flexor stretch  2x30" 2x30" 2x30" 2x30" hold hold      PPT with ball squeeze  5"x10 5"x10 5"x15 5"x15 5"x15 5"x15      Supine SLR  x10ea x15 x15 x15 x15 x15      Bridging  x10 x15 x15 x15 x15 x15      Prone hip ext      x15 x15      Seated woodchops    x10ea NP x15ea x15ea      Seated alt UE/LE    x10ea NP        Standing SLR 3 ways   x15ea  B/L x15ea  B/L NP x15 ea x15ea                   Trigger point to L lumbar parapsinals      8' 8'

## 2019-10-07 ENCOUNTER — APPOINTMENT (OUTPATIENT)
Dept: PHYSICAL THERAPY | Facility: CLINIC | Age: 84
End: 2019-10-07
Payer: MEDICARE

## 2019-10-09 ENCOUNTER — IMMUNIZATIONS (OUTPATIENT)
Dept: FAMILY MEDICINE CLINIC | Facility: CLINIC | Age: 84
End: 2019-10-09
Payer: MEDICARE

## 2019-10-09 ENCOUNTER — OFFICE VISIT (OUTPATIENT)
Dept: PHYSICAL THERAPY | Facility: CLINIC | Age: 84
End: 2019-10-09
Payer: MEDICARE

## 2019-10-09 DIAGNOSIS — Z23 ENCOUNTER FOR IMMUNIZATION: ICD-10-CM

## 2019-10-09 DIAGNOSIS — G89.29 CHRONIC LEFT-SIDED LOW BACK PAIN WITHOUT SCIATICA: Primary | ICD-10-CM

## 2019-10-09 DIAGNOSIS — M54.50 CHRONIC LEFT-SIDED LOW BACK PAIN WITHOUT SCIATICA: Primary | ICD-10-CM

## 2019-10-09 PROCEDURE — 90662 IIV NO PRSV INCREASED AG IM: CPT

## 2019-10-09 PROCEDURE — 97110 THERAPEUTIC EXERCISES: CPT

## 2019-10-09 PROCEDURE — 97140 MANUAL THERAPY 1/> REGIONS: CPT

## 2019-10-09 PROCEDURE — G0008 ADMIN INFLUENZA VIRUS VAC: HCPCS

## 2019-10-09 NOTE — PROGRESS NOTES
Daily Note     Today's date: 10/9/2019  Patient name: Charlotte Cross  : 1935  MRN: [id number]  Referring provider: Day Ni DO  Dx:   Encounter Diagnosis     ICD-10-CM    1  Chronic left-sided low back pain without sciatica M54 5     G89 29                   Subjective: "I haven't had any pain for a few days "  Objective: See treatment diary below      Assessment: Performed exercise program w/o complaint  Cont to require vc'ing t/o same for technique and counting reps  Comfortable during TPM to L lumbar paraspinals and SIJ  Will monitor  Plan: Continue per plan of care        Precautions: none          Exercise Diary  9/9 9/16 9/18 9/23 9/25 9/30 10/2 10/9     Bike  5' 5' 6' 6' 6' 6' 6'     SKTC 10x10" 10x  10" 10x10" 10x10" 10x10" 10x10" 10x10" 10x10"     LTR 5"x15 5"x15 5"x15 5"x15 5"x15 5"x15 5"x15 5"x15     L mod piriformis stretch 3x30" 3x30" 3x30" 3x30" 3x30" 3x30" 3x30' 3x30"     Supine hip flexor stretch  2x30" 2x30" 2x30" 2x30" hold hold hold     PPT with ball squeeze  5"x10 5"x10 5"x15 5"x15 5"x15 5"x15 5"x15     Supine SLR  x10ea x15 x15 x15 x15 x15 x15ea     Bridging  x10 x15 x15 x15 x15 x15 x15     Prone hip ext      x15 x15 x15ea     Seated woodchops    x10ea NP x15ea x15ea x15ea     Seated alt UE/LE    x10ea NP        Standing SLR 3 ways   x15ea  B/L x15ea  B/L NP x15 ea x15ea x15ea                  Trigger point to L lumbar parapsinals      8' 8' 8'

## 2019-10-14 ENCOUNTER — OFFICE VISIT (OUTPATIENT)
Dept: PHYSICAL THERAPY | Facility: CLINIC | Age: 84
End: 2019-10-14
Payer: MEDICARE

## 2019-10-14 DIAGNOSIS — G89.29 CHRONIC LEFT-SIDED LOW BACK PAIN WITHOUT SCIATICA: Primary | ICD-10-CM

## 2019-10-14 DIAGNOSIS — M54.50 CHRONIC LEFT-SIDED LOW BACK PAIN WITHOUT SCIATICA: Primary | ICD-10-CM

## 2019-10-14 PROCEDURE — 97110 THERAPEUTIC EXERCISES: CPT

## 2019-10-14 NOTE — PROGRESS NOTES
Daily Note     Today's date: 10/14/2019  Patient name: Ziyad Vincent  : 1935  MRN: [id number]  Referring provider: Yodit Ortiz DO  Dx:   Encounter Diagnosis     ICD-10-CM    1  Chronic left-sided low back pain without sciatica M54 5     G89 29                   Subjective: "I should have done this right after I fell " Cont to report no LBP  Objective: See treatment diary below      Assessment: Performed exercise progressions w/o difficulty or discomfort  Cont to require vc'ing t/o same for technique and counting reps  Trial w/o TPM to L lumbar paraspinals and SIJ  Will monitor  Plan: Continue per plan of care        Precautions: none          Exercise Diary  9/9 9/16 9/18 9/23 9/25 9/30 10/2 10/9 10/14    Bike  5' 5' 6' 6' 6' 6' 6' 6'    SKTC 10x10" 10x  10" 10x10" 10x10" 10x10" 10x10" 10x10" 10x10" 10x10"    LTR 5"x15 5"x15 5"x15 5"x15 5"x15 5"x15 5"x15 5"x15 5"x15    L mod piriformis stretch 3x30" 3x30" 3x30" 3x30" 3x30" 3x30" 3x30' 3x30" 3x30"    Supine hip flexor stretch  2x30" 2x30" 2x30" 2x30" hold hold hold hold    PPT with ball squeeze  5"x10 5"x10 5"x15 5"x15 5"x15 5"x15 5"x15 5'x15    Supine SLR  x10ea x15 x15 x15 x15 x15 x15ea x20ea    Bridging  x10 x15 x15 x15 x15 x15 x15 x20    Prone hip ext      x15 x15 x15ea x15ea    Seated woodchops    x10ea NP x15ea x15ea x15ea x20ea    Seated alt UE/LE    x10ea NP        Standing SLR 3 ways   x15ea  B/L x15ea  B/L NP x15 ea x15ea x15ea x15ea                 Trigger point to L lumbar parapsinals      8' 8' 8' Trial  w/o

## 2019-10-16 ENCOUNTER — OFFICE VISIT (OUTPATIENT)
Dept: PHYSICAL THERAPY | Facility: CLINIC | Age: 84
End: 2019-10-16
Payer: MEDICARE

## 2019-10-16 DIAGNOSIS — M54.50 CHRONIC LEFT-SIDED LOW BACK PAIN WITHOUT SCIATICA: Primary | ICD-10-CM

## 2019-10-16 DIAGNOSIS — G89.29 CHRONIC LEFT-SIDED LOW BACK PAIN WITHOUT SCIATICA: Primary | ICD-10-CM

## 2019-10-16 PROCEDURE — 97110 THERAPEUTIC EXERCISES: CPT | Performed by: PHYSICAL THERAPIST

## 2019-10-16 NOTE — PROGRESS NOTES
Daily Note     Today's date: 10/16/2019  Patient name: Janet Castillo  : 1935  MRN: [id number]  Referring provider: Steve Sutton DO  Dx:   Encounter Diagnosis     ICD-10-CM    1  Chronic left-sided low back pain without sciatica M54 5     G89 29                   Subjective: "I'm feeling really good  I'm ready to be released "      Objective: See treatment diary below      Assessment: Pt completes all exercises with ease  Pt to be discharged to independent [facility]  Plan: No further skilled PT required       Precautions: none          Exercise Diary  9/9 9/16 9/18 9/23 9/25 9/30 10/2 10/9 10/14 10/16   Bike  5' 5' 6' 6' 6' 6' 6' 6' 8'   SKTC 10x10" 10x  10" 10x10" 10x10" 10x10" 10x10" 10x10" 10x10" 10x10" 10x10"   LTR 5"x15 5"x15 5"x15 5"x15 5"x15 5"x15 5"x15 5"x15 5"x15 5"x15   L mod piriformis stretch 3x30" 3x30" 3x30" 3x30" 3x30" 3x30" 3x30' 3x30" 3x30" 3x30"   PPT with ball squeeze  5"x10 5"x10 5"x15 5"x15 5"x15 5"x15 5"x15 5'x15 5"x20   Supine SLR  x10ea x15 x15 x15 x15 x15 x15ea x20ea x20ea   Bridging  x10 x15 x15 x15 x15 x15 x15 x20 x20   Prone hip ext      x15 x15 x15ea x15ea x20ea   Seated woodchops    x10ea NP x15ea x15ea x15ea x20ea x20ea   Seated alt UE/LE    x10ea NP        Standing SLR 3 ways   x15ea  B/L x15ea  B/L NP x15 ea x15ea x15ea x15ea x20ea                Trigger point to L lumbar parapsinals      8' 8' 8' Trial  w/o

## 2019-10-28 ENCOUNTER — OFFICE VISIT (OUTPATIENT)
Dept: FAMILY MEDICINE CLINIC | Facility: CLINIC | Age: 84
End: 2019-10-28
Payer: MEDICARE

## 2019-10-28 VITALS
TEMPERATURE: 97 F | WEIGHT: 146.2 LBS | HEIGHT: 63 IN | SYSTOLIC BLOOD PRESSURE: 120 MMHG | RESPIRATION RATE: 16 BRPM | OXYGEN SATURATION: 95 % | BODY MASS INDEX: 25.91 KG/M2 | DIASTOLIC BLOOD PRESSURE: 70 MMHG | HEART RATE: 67 BPM

## 2019-10-28 DIAGNOSIS — Z00.00 MEDICARE ANNUAL WELLNESS VISIT, SUBSEQUENT: Primary | ICD-10-CM

## 2019-10-28 DIAGNOSIS — I71.2 THORACIC AORTIC ANEURYSM WITHOUT RUPTURE (HCC): ICD-10-CM

## 2019-10-28 DIAGNOSIS — I10 ESSENTIAL HYPERTENSION: ICD-10-CM

## 2019-10-28 DIAGNOSIS — N18.30 CHRONIC KIDNEY DISEASE, STAGE 3 (HCC): ICD-10-CM

## 2019-10-28 DIAGNOSIS — R73.01 IMPAIRED FASTING GLUCOSE: ICD-10-CM

## 2019-10-28 PROCEDURE — G0439 PPPS, SUBSEQ VISIT: HCPCS | Performed by: FAMILY MEDICINE

## 2019-10-28 NOTE — PROGRESS NOTES
Assessment and Plan:     Problem List Items Addressed This Visit        Endocrine    Impaired fasting glucose    Relevant Orders    Hemoglobin A1C With EAG       Cardiovascular and Mediastinum    Thoracic aortic aneurysm without rupture (HCC)     Stable followed by cardiology         Hypertension    Relevant Orders    CBC    Comprehensive metabolic panel    Lipid Panel with Direct LDL reflex    TSH, 3rd generation with Free T4 reflex       Genitourinary    Chronic kidney disease, stage 3 (HCC)     Stable  Avoidance meds          Relevant Orders    Comprehensive metabolic panel       Other    Medicare annual wellness visit, subsequent - Primary     Up to date                Preventive health issues were discussed with patient, and age appropriate screening tests were ordered as noted in patient's After Visit Summary  Personalized health advice and appropriate referrals for health education or preventive services given if needed, as noted in patient's After Visit Summary       History of Present Illness:     Patient presents for Medicare Annual Wellness visit    Patient Care Team:  Rachel Pruitt DO as PCP - MD Bruce Stanley MD Stevens Ringer, DO Alfonza Provost, MD Whitney Peeks, DO     Problem List:     Patient Active Problem List   Diagnosis    Coronary artery disease involving native heart without angina pectoris    [facility]    Hypertension    Carotid artery obstruction, left    Aneurysm of ascending aorta [facility]    Arteriosclerosis of carotid artery    Carotid atherosclerosis    Chronic kidney disease, stage 3 ([address] )    GERD without esophagitis    Hyperlipidemia    Impaired fasting glucose    Polyp of sigmoid colon    Medicare annual wellness visit, subsequent    Chronic left-sided low back pain without sciatica      Past Medical and Surgical History:     Past Medical History:   Diagnosis Date    Aorta aneurysm [facility]     Cardiac disease     Fall 5/23/2019    GERD (gastroesophageal reflux disease)     Hematuria     last assessed: 10/28/2013    Hip pain, acute, left 5/23/2019    Shortness of breath     last assessed: 6/27/2013     Past Surgical History:   Procedure Laterality Date    ADENOIDECTOMY      APPENDECTOMY      BREAST BIOPSY Left     BREAST LUMPECTOMY Left     CATARACT EXTRACTION      CERVICAL FUSION      HYSTERECTOMY      OOPHORECTOMY      OTHER SURGICAL HISTORY      paravaginal defect graft-reinforced repair    REPAIR RECTOCELE      TONSILLECTOMY      VEIN LIGATION AND STRIPPING Bilateral       Family History:     Family History   Problem Relation Age of Onset    Diabetes Mother         mellitus    Glaucoma Mother     Hypertension Mother     Macular degeneration Mother     Stroke Father         syndrome    Hypertension Father     Cancer Sister     Dementia Sister     Heart disease Sister     No Known Problems Child     No Known Problems Child     No Known Problems Sister     No Known Problems Sister     No Known Problems Sister     No Known Problems Sister     Breast cancer Sister     No Known Problems Maternal Grandmother     No Known Problems Maternal Grandfather     No Known Problems Paternal Grandmother     No Known Problems Paternal Grandfather     No Known Problems Sister     Cancer Maternal Aunt     Cancer Maternal Aunt     Cancer Maternal Aunt     No Known Problems Maternal Aunt     No Known Problems Maternal Aunt     No Known Problems Maternal Aunt     No Known Problems Paternal Aunt     No Known Problems Paternal Aunt     No Known Problems Paternal Aunt     No Known Problems Paternal Aunt     No Known Problems Paternal Aunt       Social History:     Social History     Socioeconomic History    Marital status: /Civil Union     Spouse name: Not on file    Number of children: Not on file    Years of education: Not on file    Highest education level: Not on file   Occupational History    Not on file   Social Needs    Financial resource strain: Not on file    Food insecurity:     Worry: Not on file     Inability: Not on file    Transportation needs:     Medical: Not on file     Non-medical: Not on file   Tobacco Use    Smoking status: Former Smoker    Smokeless tobacco: Never Used   Substance and Sexual Activity    Alcohol use: No    Drug use: No    Sexual activity: Never   Lifestyle    Physical activity:     Days per week: Not on file     Minutes per session: Not on file    Stress: Not on file   Relationships    Social connections:     Talks on phone: Not on file     Gets together: Not on file     Attends [employer] service: Not on file     Active member of club or organization: Not on file     Attends meetings of clubs or organizations: Not on file     Relationship status: Not on file    Intimate partner violence:     Fear of current or ex partner: Not on file     Emotionally abused: Not on file     Physically abused: Not on file     Forced sexual activity: Not on file   Other Topics Concern    Not on file   Social History Narrative    Daily coffee consumption (1 cups/day)       Medications and Allergies:     Current Outpatient Medications   Medication Sig Dispense Refill    atorvastatin (LIPITOR) 10 mg tablet Take 1 tablet (10 mg total) by mouth daily 30 tablet 11    metoprolol tartrate (LOPRESSOR) 50 mg tablet Take 2 tablets (100 mg total) by mouth daily 180 tablet 3    omeprazole (PriLOSEC) 20 mg delayed release capsule Take 1 capsule (20 mg total) by mouth daily 90 capsule 3    triamterene-hydrochlorothiazide (MAXZIDE-25) 37 5-25 mg per tablet Take 1 tablet by mouth daily 90 tablet 3     No current facility-administered medications for this visit        No Known Allergies   Immunizations:     Immunization History   Administered Date(s) Administered    INFLUENZA 01/20/2012, 11/07/2012, 10/07/2017    Influenza Split High Dose Preservative Free IM 10/28/2013, 10/23/2014, 09/02/2015, 09/26/2016, 10/07/2017    Influenza TIV (IM) 01/01/2012    Influenza, high dose seasonal 0 5 mL 10/06/2018, 10/09/2019    Pneumococcal Conjugate 13-Valent 02/12/2015    Pneumococcal Polysaccharide PPV23 08/30/2011, 01/01/2012    Zoster 01/01/2013      Health Maintenance:         Topic Date Due    CRC Screening: Colonoscopy  12/21/2025     There are no preventive care reminders to display for this patient  Medicare Health Risk Assessment:     /70   Pulse 67   Temp (!) 97 °F (36 1 °C)   Resp 16   Ht 5' 2 99" (1 6 m)   Wt 66 3 kg (146 lb 3 2 oz)   SpO2 95%   BMI 25 90 kg/m²      José Antonio Carlos is here for her Subsequent Wellness visit  Health Risk Assessment:   Patient rates overall health as good  Patient feels that their physical health rating is much better  Eyesight was rated as same  Hearing was rated as same  Patient feels that their emotional and mental health rating is same  Pain experienced in the last 7 days has been none  Patient states that she has experienced no weight loss or gain in last 6 months  Fall Risk Screening: In the past year, patient has experienced: no history of falling in past year      Urinary Incontinence Screening:   Patient has not leaked urine accidently in the last six months  Home Safety:  Patient does not have trouble with stairs inside or outside of their home  Patient has working smoke alarms and has working carbon monoxide detector  Home safety hazards include: none  Nutrition:   Current diet is Regular  Medications:   Patient is not currently taking any over-the-counter supplements  Patient is able to manage medications  Activities of Daily Living (ADLs)/Instrumental Activities of Daily Living (IADLs):   Walk and transfer into and out of bed and chair?: Yes  Dress and groom yourself?: Yes    Bathe or shower yourself?: Yes    Feed yourself?  Yes  Do your laundry/housekeeping?: Yes  Manage your money, pay your bills and track your expenses?: Yes  Make your own meals?: Yes    Do your own shopping?: Yes    Previous Hospitalizations:   Any hospitalizations or ED visits within the last 12 months?: No      Advance Care Planning:   Living will: Yes    Durable POA for healthcare: Yes    Advanced directive: Yes      Cognitive Screening:   Provider or family/friend/caregiver concerned regarding cognition?: No    PREVENTIVE SCREENINGS      Cardiovascular Screening:    General: Screening Not Indicated and History Lipid Disorder      Diabetes Screening:     General: Screening Current      Colorectal Cancer Screening:     General: Screening Current      Breast Cancer Screening:     General: Screening Current      Cervical Cancer Screening:    General: Screening Not Indicated      Lung Cancer Screening:     General: Screening Not Indicated      Hepatitis C Screening:    General: Screening Not Indicated    Other Counseling Topics:   Regular weightbearing exercise         Magy Pompa,

## 2019-10-28 NOTE — PATIENT INSTRUCTIONS
Medicare Preventive Visit Patient Instructions  Thank you for completing your Welcome to Medicare Visit or Medicare Annual Wellness Visit today  Your next wellness visit will be due in one year (10/28/2020)  The screening/preventive services that you may require over the next 5-10 years are detailed below  Some tests may not apply to you based off risk factors and/or age  Screening tests ordered at today's visit but not completed yet may show as past due  Also, please note that scanned in results may not display below  Preventive Screenings:  Service Recommendations Previous Testing/Comments   Colorectal Cancer Screening  * Colonoscopy    * Fecal Occult Blood Test (FOBT)/Fecal Immunochemical Test (FIT)  * Fecal DNA/Cologuard Test  * Flexible Sigmoidoscopy Age: 54-65 years old   Colonoscopy: every 10 years (may be performed more frequently if at higher risk)  OR  FOBT/FIT: every 1 year  OR  Cologuard: every 3 years  OR  Sigmoidoscopy: every 5 years  Screening may be recommended earlier than age 48 if at higher risk for colorectal cancer  Also, an individualized decision between you and your healthcare provider will decide whether screening between the ages of 74-80 would be appropriate  Colonoscopy: Not on file  FOBT/FIT: Not on file  Cologuard: Not on file  Sigmoidoscopy: Not on file         Breast Cancer Screening Age: 36 years old  Frequency: every 1-2 years  Not required if history of left and right mastectomy Mammogram: 06/17/2019       Cervical Cancer Screening Between the ages of 21-29, pap smear recommended once every 3 years  Between the ages of 33-67, can perform pap smear with HPV co-testing every 5 years     Recommendations may differ for women with a history of total hysterectomy, cervical cancer, or abnormal pap smears in past  Pap Smear: Not on file       Hepatitis C Screening Once for adults born between [facility]  More frequently in patients at high risk for Hepatitis C Hep C Antibody: Not on file       Diabetes Screening 1-2 times per year if you're at risk for diabetes or have pre-diabetes Fasting glucose: No results in last 5 years   A1C: 6 6 % of total Hgb       Cholesterol Screening Once every 5 years if you don't have a lipid disorder  May order more often based on risk factors  Lipid panel: 08/26/2019         Other Preventive Screenings Covered by Medicare:  1  Abdominal Aortic Aneurysm (AAA) Screening: covered once if your at risk  You're considered to be at risk if you have a family history of AAA  2  Lung Cancer Screening: covers low dose CT scan once per year if you meet all of the following conditions: (1) Age 50-69; (2) No signs or symptoms of lung cancer; (3) Current smoker or have quit smoking within the last 15 years; (4) You have a tobacco smoking history of at least 30 pack years (packs per day multiplied by number of years you smoked); (5) You get a written order from a healthcare provider  3  Glaucoma Screening: covered annually if you're considered high risk: (1) You have diabetes OR (2) Family history of glaucoma OR (3)  aged 48 and older OR (3)  American aged 72 and older  3  Osteoporosis Screening: covered every 2 years if you meet one of the following conditions: (1) You're estrogen deficient and at risk for osteoporosis based off medical history and other findings; (2) Have a vertebral abnormality; (3) On glucocorticoid therapy for more than 3 months; (4) Have primary hyperparathyroidism; (5) On osteoporosis medications and need to assess response to drug therapy  · Last bone density test (DXA Scan): 03/06/2015  5  HIV Screening: covered annually if you're between the age of 12-76  Also covered annually if you are younger than 13 and older than 72 with risk factors for HIV infection  For pregnant patients, it is covered up to 3 times per pregnancy      Immunizations:  Immunization Recommendations   Influenza Vaccine Annual influenza vaccination during flu season is recommended for all persons aged >= 6 months who do not have contraindications   Pneumococcal Vaccine (Prevnar and Pneumovax)  * Prevnar = PCV13  * Pneumovax = PPSV23   Adults 25-60 years old: 1-3 doses may be recommended based on certain risk factors  Adults 72 years old: Prevnar (PCV13) vaccine recommended followed by Pneumovax (PPSV23) vaccine  If already received PPSV23 since turning 65, then PCV13 recommended at least one year after PPSV23 dose  Hepatitis B Vaccine 3 dose series if at intermediate or high risk (ex: diabetes, end stage renal disease, liver disease)   Tetanus (Td) Vaccine - COST NOT COVERED BY MEDICARE PART B Following completion of primary series, a booster dose should be given every 10 years to maintain immunity against tetanus  Td may also be given as tetanus wound prophylaxis  Tdap Vaccine - COST NOT COVERED BY MEDICARE PART B Recommended at least once for all adults  For pregnant patients, recommended with each pregnancy  Shingles Vaccine (Shingrix) - COST NOT COVERED BY MEDICARE PART B  2 shot series recommended in those aged 48 and above     Health Maintenance Due:      Topic Date Due    CRC Screening: Colonoscopy  12/21/2025     Immunizations Due:  There are no preventive care reminders to display for this patient  Advance Directives   What are advance directives? Advance directives are legal documents that state your wishes and plans for medical care  These plans are made ahead of time in case you lose your ability to make decisions for yourself  Advance directives can apply to any medical decision, such as the treatments you want, and if you want to donate organs  What are the types of advance directives? There are many types of advance directives, and each state has rules about how to use them  You may choose a combination of any of the following:  · Living will: This is a written record of the treatment you want   You can also choose which treatments you do not want, which to limit, and which to stop at a certain time  This includes surgery, medicine, IV fluid, and tube feedings  · Durable power of  for healthcare [facility]): This is a written record that states who you want to make healthcare choices for you when you are unable to make them for yourself  This person, called a proxy, is usually a family member or a friend  You may choose more than 1 proxy  · Do not resuscitate (DNR) order:  A DNR order is used in case your heart stops beating or you stop breathing  It is a request not to have certain forms of treatment, such as CPR  A DNR order may be included in other types of advance directives  · Medical directive: This covers the care that you want if you are in a coma, near death, or unable to make decisions for yourself  You can list the treatments you want for each condition  Treatment may include pain medicine, surgery, blood transfusions, dialysis, IV or tube feedings, and a ventilator (breathing machine)  · Values history: This document has questions about your views, beliefs, and how you feel and think about life  This information can help others choose the care that you would choose  Why are advance directives important? An advance directive helps you control your care  Although spoken wishes may be used, it is better to have your wishes written down  Spoken wishes can be misunderstood, or not followed  Treatments may be given even if you do not want them  An advance directive may make it easier for your family to make difficult choices about your care  Weight Management   Why it is important to manage your weight:  Being overweight increases your risk of health conditions such as heart disease, high blood pressure, type 2 diabetes, and certain types of cancer  It can also increase your risk for osteoarthritis, sleep apnea, and other respiratory problems  Aim for a slow, steady weight loss   Even a small amount of weight loss can lower your risk of health problems  How to lose weight safely:  A safe and healthy way to lose weight is to eat fewer calories and get regular exercise  You can lose up about 1 pound a week by decreasing the number of calories you eat by 500 calories each day  Healthy meal plan for weight management:  A healthy meal plan includes a variety of foods, contains fewer calories, and helps you stay healthy  A healthy meal plan includes the following:  · Eat whole-grain foods more often  A healthy meal plan should contain fiber  Fiber is the part of grains, fruits, and vegetables that is not broken down by your body  Whole-grain foods are healthy and provide extra fiber in your diet  Some examples of whole-grain foods are whole-wheat breads and pastas, oatmeal, brown rice, and bulgur  · Eat a variety of vegetables every day  Include dark, leafy greens such as spinach, kale, jomar greens, and mustard greens  Eat yellow and orange vegetables such as carrots, sweet potatoes, and winter squash  · Eat a variety of fruits every day  Choose fresh or canned fruit (canned in its own juice or light syrup) instead of juice  Fruit juice has very little or no fiber  · Eat low-fat dairy foods  Drink fat-free (skim) milk or 1% milk  Eat fat-free yogurt and low-fat cottage cheese  Try low-fat cheeses such as mozzarella and other reduced-fat cheeses  · Choose meat and other protein foods that are low in fat  Choose beans or other legumes such as split peas or lentils  Choose fish, skinless poultry (chicken or turkey), or lean cuts of red meat (beef or pork)  Before you cook meat or poultry, cut off any visible fat  · Use less fat and oil  Try baking foods instead of frying them  Add less fat, such as margarine, sour cream, regular salad dressing and mayonnaise to foods  Eat fewer high-fat foods  Some examples of high-fat foods include french fries, doughnuts, ice cream, and cakes  · Eat fewer sweets    Limit foods and drinks that are high in sugar  This includes candy, cookies, regular soda, and sweetened drinks  Exercise:  Exercise at least 30 minutes per day on most days of the week  Some examples of exercise include walking, biking, dancing, and swimming  You can also fit in more physical activity by taking the stairs instead of the elevator or parking farther away from stores  Ask your healthcare provider about the best exercise plan for you  © Copyright [employer] 2018 Information is for End User's use only and may not be sold, redistributed or otherwise used for commercial purposes   All illustrations and images included in CareNotes® are the copyrighted property of A D A M , Inc  or [address] [place] [address]

## 2020-01-03 ENCOUNTER — TRANSCRIBE ORDERS (OUTPATIENT)
Dept: ADMINISTRATIVE | Facility: HOSPITAL | Age: 85
End: 2020-01-03

## 2020-01-03 DIAGNOSIS — Z12.31 SCREENING MAMMOGRAM FOR HIGH-RISK PATIENT: Primary | ICD-10-CM

## 2020-02-16 DIAGNOSIS — K21.9 GERD WITHOUT ESOPHAGITIS: ICD-10-CM

## 2020-02-16 DIAGNOSIS — I10 ESSENTIAL HYPERTENSION: ICD-10-CM

## 2020-02-16 RX ORDER — METOPROLOL TARTRATE 50 MG/1
TABLET, FILM COATED ORAL
Qty: 180 TABLET | Refills: 3 | Status: SHIPPED | OUTPATIENT
Start: 2020-02-16 | End: 2021-02-10 | Stop reason: SDUPTHER

## 2020-02-16 RX ORDER — TRIAMTERENE AND HYDROCHLOROTHIAZIDE 37.5; 25 MG/1; MG/1
1 TABLET ORAL DAILY
Qty: 90 TABLET | Refills: 3 | Status: SHIPPED | OUTPATIENT
Start: 2020-02-16 | End: 2021-02-10 | Stop reason: SDUPTHER

## 2020-02-16 RX ORDER — OMEPRAZOLE 20 MG/1
CAPSULE, DELAYED RELEASE ORAL
Qty: 90 CAPSULE | Refills: 3 | Status: SHIPPED | OUTPATIENT
Start: 2020-02-16 | End: 2021-02-10 | Stop reason: SDUPTHER

## 2020-03-23 DIAGNOSIS — I25.10 CORONARY ARTERY DISEASE INVOLVING NATIVE HEART WITHOUT ANGINA PECTORIS, UNSPECIFIED VESSEL OR LESION TYPE: ICD-10-CM

## 2020-03-23 DIAGNOSIS — E78.2 MIXED HYPERLIPIDEMIA: ICD-10-CM

## 2020-03-24 DIAGNOSIS — E78.2 MIXED HYPERLIPIDEMIA: ICD-10-CM

## 2020-03-24 DIAGNOSIS — I25.10 CORONARY ARTERY DISEASE INVOLVING NATIVE HEART WITHOUT ANGINA PECTORIS, UNSPECIFIED VESSEL OR LESION TYPE: ICD-10-CM

## 2020-03-24 RX ORDER — ATORVASTATIN CALCIUM 10 MG/1
TABLET, FILM COATED ORAL
Qty: 90 TABLET | Refills: 5 | Status: SHIPPED | OUTPATIENT
Start: 2020-03-24 | End: 2021-06-14 | Stop reason: SDUPTHER

## 2020-03-24 RX ORDER — ATORVASTATIN CALCIUM 10 MG/1
10 TABLET, FILM COATED ORAL DAILY
Qty: 30 TABLET | Refills: 11 | Status: SHIPPED | OUTPATIENT
Start: 2020-03-24 | End: 2020-03-24

## 2020-04-02 LAB
ALBUMIN SERPL-MCNC: 4.2 G/DL (ref 3.6–5.1)
ALBUMIN/GLOB SERPL: 1.4 (CALC) (ref 1–2.5)
ALP SERPL-CCNC: 91 U/L (ref 37–153)
ALT SERPL-CCNC: 22 U/L (ref 6–29)
AST SERPL-CCNC: 25 U/L (ref 10–35)
BILIRUB SERPL-MCNC: 0.6 MG/DL (ref 0.2–1.2)
BUN SERPL-MCNC: 26 MG/DL (ref 7–25)
BUN/CREAT SERPL: 19 (CALC) (ref 6–22)
CALCIUM SERPL-MCNC: 10.3 MG/DL (ref 8.6–10.4)
CHLORIDE SERPL-SCNC: 97 MMOL/L (ref 98–110)
CHOLEST SERPL-MCNC: 136 MG/DL
CHOLEST/HDLC SERPL: 2.4 (CALC)
CO2 SERPL-SCNC: 31 MMOL/L (ref 20–32)
CREAT SERPL-MCNC: 1.35 MG/DL (ref 0.6–0.88)
ERYTHROCYTE [DISTWIDTH] IN BLOOD BY AUTOMATED COUNT: 12.9 % (ref 11–15)
EST. AVERAGE GLUCOSE BLD GHB EST-MCNC: 148 (CALC)
EST. AVERAGE GLUCOSE BLD GHB EST-SCNC: 8.2 (CALC)
GLOBULIN SER CALC-MCNC: 3.1 G/DL (CALC) (ref 1.9–3.7)
GLUCOSE SERPL-MCNC: 131 MG/DL (ref 65–99)
HBA1C MFR BLD: 6.8 % OF TOTAL HGB
HCT VFR BLD AUTO: 43.5 % (ref 35–45)
HDLC SERPL-MCNC: 56 MG/DL
HGB BLD-MCNC: 14.7 G/DL (ref 11.7–15.5)
LDLC SERPL CALC-MCNC: 64 MG/DL (CALC)
MCH RBC QN AUTO: 31.7 PG (ref 27–33)
MCHC RBC AUTO-ENTMCNC: 33.8 G/DL (ref 32–36)
MCV RBC AUTO: 94 FL (ref 80–100)
NONHDLC SERPL-MCNC: 80 MG/DL (CALC)
PLATELET # BLD AUTO: 200 THOUSAND/UL (ref 140–400)
PMV BLD REES-ECKER: 10.8 FL (ref 7.5–12.5)
POTASSIUM SERPL-SCNC: 3.8 MMOL/L (ref 3.5–5.3)
PROT SERPL-MCNC: 7.3 G/DL (ref 6.1–8.1)
RBC # BLD AUTO: 4.63 MILLION/UL (ref 3.8–5.1)
SL AMB EGFR AFRICAN AMERICAN: 42 ML/MIN/1.73M2
SL AMB EGFR NON AFRICAN AMERICAN: 36 ML/MIN/1.73M2
SODIUM SERPL-SCNC: 138 MMOL/L (ref 135–146)
TRIGL SERPL-MCNC: 75 MG/DL
TSH SERPL-ACNC: 2.23 MIU/L (ref 0.4–4.5)
WBC # BLD AUTO: 5.8 THOUSAND/UL (ref 3.8–10.8)

## 2020-04-09 ENCOUNTER — TELEMEDICINE (OUTPATIENT)
Dept: CARDIOLOGY CLINIC | Facility: CLINIC | Age: 85
End: 2020-04-09
Payer: MEDICARE

## 2020-04-09 DIAGNOSIS — I71.2 ANEURYSM OF ASCENDING AORTA (HCC): ICD-10-CM

## 2020-04-09 DIAGNOSIS — I25.10 CORONARY ARTERY DISEASE INVOLVING NATIVE CORONARY ARTERY OF NATIVE HEART WITHOUT ANGINA PECTORIS: Primary | ICD-10-CM

## 2020-04-09 DIAGNOSIS — I10 ESSENTIAL HYPERTENSION: ICD-10-CM

## 2020-04-09 DIAGNOSIS — E78.2 MIXED HYPERLIPIDEMIA: ICD-10-CM

## 2020-04-09 DIAGNOSIS — I65.22 CAROTID ARTERY OBSTRUCTION, LEFT: ICD-10-CM

## 2020-04-09 PROCEDURE — 99442 PR PHYS/QHP TELEPHONE EVALUATION 11-20 MIN: CPT | Performed by: INTERNAL MEDICINE

## 2020-04-13 ENCOUNTER — TELEMEDICINE (OUTPATIENT)
Dept: FAMILY MEDICINE CLINIC | Facility: CLINIC | Age: 85
End: 2020-04-13
Payer: MEDICARE

## 2020-04-13 VITALS — WEIGHT: 145 LBS | BODY MASS INDEX: 25.69 KG/M2 | TEMPERATURE: 98 F

## 2020-04-13 DIAGNOSIS — N18.30 CHRONIC KIDNEY DISEASE, STAGE 3 (HCC): ICD-10-CM

## 2020-04-13 DIAGNOSIS — I10 ESSENTIAL HYPERTENSION: ICD-10-CM

## 2020-04-13 DIAGNOSIS — E78.2 MIXED HYPERLIPIDEMIA: ICD-10-CM

## 2020-04-13 DIAGNOSIS — I25.10 CORONARY ARTERY DISEASE INVOLVING NATIVE CORONARY ARTERY OF NATIVE HEART WITHOUT ANGINA PECTORIS: ICD-10-CM

## 2020-04-13 DIAGNOSIS — R73.01 IMPAIRED FASTING GLUCOSE: Primary | ICD-10-CM

## 2020-04-13 PROCEDURE — 99442 PR PHYS/QHP TELEPHONE EVALUATION 11-20 MIN: CPT | Performed by: FAMILY MEDICINE

## 2020-07-04 LAB
ALBUMIN/CREAT UR: 154 MCG/MG CREAT
CREAT UR-MCNC: 93 MG/DL (ref 20–275)
EST. AVERAGE GLUCOSE BLD GHB EST-MCNC: 146 (CALC)
EST. AVERAGE GLUCOSE BLD GHB EST-SCNC: 8.1 (CALC)
HBA1C MFR BLD: 6.7 % OF TOTAL HGB
MICROALBUMIN UR-MCNC: 14.3 MG/DL

## 2020-07-15 ENCOUNTER — OFFICE VISIT (OUTPATIENT)
Dept: FAMILY MEDICINE CLINIC | Facility: CLINIC | Age: 85
End: 2020-07-15
Payer: MEDICARE

## 2020-07-15 VITALS
RESPIRATION RATE: 16 BRPM | HEIGHT: 64 IN | OXYGEN SATURATION: 96 % | HEART RATE: 82 BPM | SYSTOLIC BLOOD PRESSURE: 120 MMHG | DIASTOLIC BLOOD PRESSURE: 82 MMHG | WEIGHT: 145.6 LBS | BODY MASS INDEX: 24.86 KG/M2 | TEMPERATURE: 98 F

## 2020-07-15 DIAGNOSIS — R73.01 IMPAIRED FASTING GLUCOSE: Primary | ICD-10-CM

## 2020-07-15 DIAGNOSIS — I10 ESSENTIAL HYPERTENSION: ICD-10-CM

## 2020-07-15 DIAGNOSIS — N18.30 CHRONIC KIDNEY DISEASE, STAGE 3 (HCC): ICD-10-CM

## 2020-07-15 DIAGNOSIS — I71.2 THORACIC AORTIC ANEURYSM WITHOUT RUPTURE (HCC): ICD-10-CM

## 2020-07-15 DIAGNOSIS — E78.2 MIXED HYPERLIPIDEMIA: ICD-10-CM

## 2020-07-15 PROCEDURE — 3074F SYST BP LT 130 MM HG: CPT | Performed by: FAMILY MEDICINE

## 2020-07-15 PROCEDURE — 3008F BODY MASS INDEX DOCD: CPT | Performed by: FAMILY MEDICINE

## 2020-07-15 PROCEDURE — 1036F TOBACCO NON-USER: CPT | Performed by: FAMILY MEDICINE

## 2020-07-15 PROCEDURE — 1160F RVW MEDS BY RX/DR IN RCRD: CPT | Performed by: FAMILY MEDICINE

## 2020-07-15 PROCEDURE — 3079F DIAST BP 80-89 MM HG: CPT | Performed by: FAMILY MEDICINE

## 2020-07-15 PROCEDURE — 4040F PNEUMOC VAC/ADMIN/RCVD: CPT | Performed by: FAMILY MEDICINE

## 2020-07-15 PROCEDURE — 99214 OFFICE O/P EST MOD 30 MIN: CPT | Performed by: FAMILY MEDICINE

## 2020-07-15 NOTE — PROGRESS NOTES
Assessment/Plan:    1  Impaired fasting glucose  Assessment & Plan:  Sugar slightly improved  Will cont diet and exercise  Avoiding meds right now    Orders:  -     Hemoglobin A1C With EAG; Future; Expected date: 11/09/2020  -     Hemoglobin A1C With EAG    2  Essential hypertension  Assessment & Plan:  Stable on current meds    Orders:  -     CBC; Future; Expected date: 11/09/2020  -     Comprehensive metabolic panel; Future; Expected date: 11/09/2020  -     CBC  -     Comprehensive metabolic panel    3  Mixed hyperlipidemia  Assessment & Plan:  Stable on lipitor    Orders:  -     Lipid Panel with Direct LDL reflex; Future; Expected date: 11/09/2020  -     TSH, 3rd generation with Free T4 reflex; Future; Expected date: 11/09/2020  -     Lipid Panel with Direct LDL reflex  -     TSH, 3rd generation with Free T4 reflex    4  Chronic kidney disease, stage 3 (HCC)  Assessment & Plan:  Stable on current meds      5  Thoracic aortic aneurysm without rupture [facility])  Assessment & Plan:  Stable  Seeing cardiology            There are no Patient Instructions on file for this visit  No follow-ups on file  Subjective:      Patient ID: Caity King is a 81 y o  female  Chief Complaint   Patient presents with    Follow-up     Patient here for follow up on Hypertension       Here for follow up  Overall feeling good  Done with PT  Not walkig as much    Hypertension   This is a chronic problem  The current episode started more than 1 year ago  The problem is unchanged  The problem is controlled  There are no associated agents to hypertension  Past treatments include beta blockers and diuretics  The current treatment provides significant improvement  There are no compliance problems  Hyperlipidemia   This is a chronic problem  The current episode started more than 1 year ago  The problem is controlled  Recent lipid tests were reviewed and are normal  There are no known factors aggravating her hyperlipidemia   The current treatment provides significant improvement of lipids  There are no compliance problems  The following portions of the patient's history were reviewed and updated as appropriate: allergies, current medications, past family history, past medical history, past social history, past surgical history and problem list     Review of Systems   Constitutional: Negative  HENT: Negative  Eyes: Negative  Respiratory: Negative  Cardiovascular: Negative  Gastrointestinal: Negative  Endocrine: Negative  Genitourinary: Negative  Musculoskeletal: Negative  Skin: Negative  Allergic/Immunologic: Negative  Neurological: Negative  Hematological: Negative  Psychiatric/Behavioral: Negative  Current Outpatient Medications   Medication Sig Dispense Refill    atorvastatin (LIPITOR) 10 mg tablet TAKE 1 TABLET(10 MG) BY MOUTH DAILY 90 tablet 5    metoprolol tartrate (LOPRESSOR) 50 mg tablet TAKE 2 TABLETS(100 MG) BY MOUTH DAILY 180 tablet 3    omeprazole (PriLOSEC) 20 mg delayed release capsule TAKE 1 CAPSULE BY MOUTH EVERY DAY 90 capsule 3    triamterene-hydrochlorothiazide (MAXZIDE-25) 37 5-25 mg per tablet TAKE 1 TABLET BY MOUTH DAILY 90 tablet 3     No current facility-administered medications for this visit  Objective:    /82   Pulse 82   Temp 98 °F (36 7 °C)   Resp 16   Ht 5' 4" (1 626 m)   Wt 66 kg (145 lb 9 6 oz)   SpO2 96%   BMI 24 99 kg/m²        Physical Exam   Constitutional: She is oriented to person, place, and time  She appears well-developed and well-nourished  HENT:   Head: Normocephalic and atraumatic  Eyes: Pupils are equal, round, and reactive to light  Conjunctivae and EOM are normal    Neck: Normal range of motion  Neck supple  Cardiovascular: Normal rate, regular rhythm, normal heart sounds and intact distal pulses  Pulmonary/Chest: Effort normal and breath sounds normal    Abdominal: Soft   Bowel sounds are normal  Musculoskeletal: Normal range of motion  Neurological: She is alert and oriented to person, place, and time  Skin: Skin is warm and dry  Capillary refill takes less than 2 seconds  Psychiatric: She has a normal mood and affect  Her behavior is normal  Judgment and thought content normal    Nursing note and vitals reviewed               Tomas Lizarraga DO

## 2020-09-01 ENCOUNTER — HOSPITAL ENCOUNTER (OUTPATIENT)
Dept: MAMMOGRAPHY | Facility: CLINIC | Age: 85
Discharge: HOME | End: 2020-09-01
Payer: MEDICARE

## 2020-09-01 VITALS — BODY MASS INDEX: 24.75 KG/M2 | TEMPERATURE: 96.3 F | HEIGHT: 64 IN | WEIGHT: 145 LBS

## 2020-09-01 DIAGNOSIS — Z12.31 SCREENING MAMMOGRAM FOR HIGH-RISK PATIENT: ICD-10-CM

## 2020-09-01 PROCEDURE — 77067 SCR MAMMO BI INCL CAD: CPT

## 2020-09-01 PROCEDURE — 77063 BREAST TOMOSYNTHESIS BI: CPT

## 2020-09-03 ENCOUNTER — IMMUNIZATIONS (OUTPATIENT)
Dept: FAMILY MEDICINE CLINIC | Facility: CLINIC | Age: 85
End: 2020-09-03
Payer: MEDICARE

## 2020-09-03 DIAGNOSIS — Z23 NEED FOR INFLUENZA VACCINATION: Primary | ICD-10-CM

## 2020-09-03 PROCEDURE — G0008 ADMIN INFLUENZA VIRUS VAC: HCPCS

## 2020-09-03 PROCEDURE — 90662 IIV NO PRSV INCREASED AG IM: CPT

## 2020-11-11 LAB
ALBUMIN SERPL-MCNC: 4.4 G/DL (ref 3.6–5.1)
ALBUMIN/GLOB SERPL: 1.4 (CALC) (ref 1–2.5)
ALP SERPL-CCNC: 107 U/L (ref 37–153)
ALT SERPL-CCNC: 20 U/L (ref 6–29)
AST SERPL-CCNC: 23 U/L (ref 10–35)
BILIRUB SERPL-MCNC: 0.7 MG/DL (ref 0.2–1.2)
BUN SERPL-MCNC: 32 MG/DL (ref 7–25)
BUN/CREAT SERPL: 22 (CALC) (ref 6–22)
CALCIUM SERPL-MCNC: 10.2 MG/DL (ref 8.6–10.4)
CHLORIDE SERPL-SCNC: 98 MMOL/L (ref 98–110)
CHOLEST SERPL-MCNC: 144 MG/DL
CHOLEST/HDLC SERPL: 2.5 (CALC)
CO2 SERPL-SCNC: 31 MMOL/L (ref 20–32)
CREAT SERPL-MCNC: 1.47 MG/DL (ref 0.6–0.88)
ERYTHROCYTE [DISTWIDTH] IN BLOOD BY AUTOMATED COUNT: 13.1 % (ref 11–15)
EST. AVERAGE GLUCOSE BLD GHB EST-MCNC: 143 (CALC)
EST. AVERAGE GLUCOSE BLD GHB EST-SCNC: 7.9 (CALC)
GLOBULIN SER CALC-MCNC: 3.1 G/DL (CALC) (ref 1.9–3.7)
GLUCOSE SERPL-MCNC: 146 MG/DL (ref 65–99)
HBA1C MFR BLD: 6.6 % OF TOTAL HGB
HCT VFR BLD AUTO: 43.5 % (ref 35–45)
HDLC SERPL-MCNC: 58 MG/DL
HGB BLD-MCNC: 14.4 G/DL (ref 11.7–15.5)
LDLC SERPL CALC-MCNC: 69 MG/DL (CALC)
MCH RBC QN AUTO: 31.5 PG (ref 27–33)
MCHC RBC AUTO-ENTMCNC: 33.1 G/DL (ref 32–36)
MCV RBC AUTO: 95.2 FL (ref 80–100)
NONHDLC SERPL-MCNC: 86 MG/DL (CALC)
PLATELET # BLD AUTO: 204 THOUSAND/UL (ref 140–400)
PMV BLD REES-ECKER: 10.3 FL (ref 7.5–12.5)
POTASSIUM SERPL-SCNC: 4.1 MMOL/L (ref 3.5–5.3)
PROT SERPL-MCNC: 7.5 G/DL (ref 6.1–8.1)
RBC # BLD AUTO: 4.57 MILLION/UL (ref 3.8–5.1)
SL AMB EGFR AFRICAN AMERICAN: 38 ML/MIN/1.73M2
SL AMB EGFR NON AFRICAN AMERICAN: 32 ML/MIN/1.73M2
SODIUM SERPL-SCNC: 140 MMOL/L (ref 135–146)
TRIGL SERPL-MCNC: 87 MG/DL
TSH SERPL-ACNC: 2.81 MIU/L (ref 0.4–4.5)
WBC # BLD AUTO: 5.7 THOUSAND/UL (ref 3.8–10.8)

## 2020-11-19 ENCOUNTER — OFFICE VISIT (OUTPATIENT)
Dept: CARDIOLOGY CLINIC | Facility: CLINIC | Age: 85
End: 2020-11-19
Payer: MEDICARE

## 2020-11-19 VITALS
OXYGEN SATURATION: 94 % | BODY MASS INDEX: 25.03 KG/M2 | SYSTOLIC BLOOD PRESSURE: 118 MMHG | WEIGHT: 146.6 LBS | HEIGHT: 64 IN | DIASTOLIC BLOOD PRESSURE: 80 MMHG | TEMPERATURE: 97.1 F | HEART RATE: 67 BPM

## 2020-11-19 DIAGNOSIS — I71.2 THORACIC AORTIC ANEURYSM WITHOUT RUPTURE (HCC): ICD-10-CM

## 2020-11-19 DIAGNOSIS — I10 ESSENTIAL HYPERTENSION: ICD-10-CM

## 2020-11-19 DIAGNOSIS — I25.10 CORONARY ARTERY DISEASE INVOLVING NATIVE CORONARY ARTERY OF NATIVE HEART WITHOUT ANGINA PECTORIS: ICD-10-CM

## 2020-11-19 DIAGNOSIS — I65.22 CAROTID ARTERY OBSTRUCTION, LEFT: Primary | ICD-10-CM

## 2020-11-19 DIAGNOSIS — E78.2 MIXED HYPERLIPIDEMIA: ICD-10-CM

## 2020-11-19 PROCEDURE — 99214 OFFICE O/P EST MOD 30 MIN: CPT | Performed by: INTERNAL MEDICINE

## 2020-11-23 ENCOUNTER — OFFICE VISIT (OUTPATIENT)
Dept: FAMILY MEDICINE CLINIC | Facility: CLINIC | Age: 85
End: 2020-11-23
Payer: MEDICARE

## 2020-11-23 VITALS
RESPIRATION RATE: 16 BRPM | HEART RATE: 55 BPM | WEIGHT: 147 LBS | HEIGHT: 64 IN | TEMPERATURE: 97.5 F | OXYGEN SATURATION: 95 % | SYSTOLIC BLOOD PRESSURE: 116 MMHG | DIASTOLIC BLOOD PRESSURE: 82 MMHG | BODY MASS INDEX: 25.1 KG/M2

## 2020-11-23 DIAGNOSIS — Z00.00 MEDICARE ANNUAL WELLNESS VISIT, SUBSEQUENT: Primary | ICD-10-CM

## 2020-11-23 DIAGNOSIS — E78.2 MIXED HYPERLIPIDEMIA: ICD-10-CM

## 2020-11-23 DIAGNOSIS — N18.32 STAGE 3B CHRONIC KIDNEY DISEASE (HCC): ICD-10-CM

## 2020-11-23 DIAGNOSIS — I10 ESSENTIAL HYPERTENSION: ICD-10-CM

## 2020-11-23 DIAGNOSIS — I71.2 THORACIC AORTIC ANEURYSM WITHOUT RUPTURE (HCC): ICD-10-CM

## 2020-11-23 DIAGNOSIS — R73.01 IMPAIRED FASTING GLUCOSE: ICD-10-CM

## 2020-11-23 PROCEDURE — G0438 PPPS, INITIAL VISIT: HCPCS | Performed by: FAMILY MEDICINE

## 2020-11-23 PROCEDURE — 1123F ACP DISCUSS/DSCN MKR DOCD: CPT | Performed by: FAMILY MEDICINE

## 2020-12-15 ENCOUNTER — HOSPITAL ENCOUNTER (OUTPATIENT)
Dept: NON INVASIVE DIAGNOSTICS | Facility: CLINIC | Age: 85
Discharge: HOME | End: 2020-12-15
Payer: MEDICARE

## 2020-12-15 DIAGNOSIS — I65.22 CAROTID ARTERY OBSTRUCTION, LEFT: ICD-10-CM

## 2020-12-15 PROCEDURE — 93880 EXTRACRANIAL BILAT STUDY: CPT | Performed by: SURGERY

## 2020-12-15 PROCEDURE — 93880 EXTRACRANIAL BILAT STUDY: CPT

## 2021-01-18 ENCOUNTER — IMMUNIZATIONS (OUTPATIENT)
Dept: FAMILY MEDICINE CLINIC | Facility: HOSPITAL | Age: 86
End: 2021-01-18

## 2021-01-18 DIAGNOSIS — Z23 ENCOUNTER FOR IMMUNIZATION: Primary | ICD-10-CM

## 2021-01-18 PROCEDURE — 91300 SARS-COV-2 / COVID-19 MRNA VACCINE (PFIZER-BIONTECH) 30 MCG: CPT

## 2021-01-18 PROCEDURE — 0001A SARS-COV-2 / COVID-19 MRNA VACCINE (PFIZER-BIONTECH) 30 MCG: CPT

## 2021-02-09 ENCOUNTER — TELEPHONE (OUTPATIENT)
Dept: CARDIOLOGY CLINIC | Facility: CLINIC | Age: 86
End: 2021-02-09

## 2021-02-09 NOTE — TELEPHONE ENCOUNTER
----- Message from Lizzeth Zhu sent at 2/9/2021  2:26 PM EST -----    ----- Message -----  From: Marco A Valadez MD  Sent: 2/9/2021  12:17 PM EST  To: [facility]    Please call and make appointment next available

## 2021-02-10 ENCOUNTER — IMMUNIZATIONS (OUTPATIENT)
Dept: FAMILY MEDICINE CLINIC | Facility: HOSPITAL | Age: 86
End: 2021-02-10

## 2021-02-10 DIAGNOSIS — K21.9 GERD WITHOUT ESOPHAGITIS: ICD-10-CM

## 2021-02-10 DIAGNOSIS — Z23 ENCOUNTER FOR IMMUNIZATION: Primary | ICD-10-CM

## 2021-02-10 DIAGNOSIS — I10 ESSENTIAL HYPERTENSION: ICD-10-CM

## 2021-02-10 PROCEDURE — 0002A SARS-COV-2 / COVID-19 MRNA VACCINE (PFIZER-BIONTECH) 30 MCG: CPT

## 2021-02-10 PROCEDURE — 91300 SARS-COV-2 / COVID-19 MRNA VACCINE (PFIZER-BIONTECH) 30 MCG: CPT

## 2021-02-10 RX ORDER — OMEPRAZOLE 20 MG/1
20 CAPSULE, DELAYED RELEASE ORAL DAILY
Qty: 90 CAPSULE | Refills: 3 | Status: SHIPPED | OUTPATIENT
Start: 2021-02-10 | End: 2021-11-29

## 2021-02-10 RX ORDER — TRIAMTERENE AND HYDROCHLOROTHIAZIDE 37.5; 25 MG/1; MG/1
1 TABLET ORAL DAILY
Qty: 90 TABLET | Refills: 3 | Status: SHIPPED | OUTPATIENT
Start: 2021-02-10 | End: 2022-02-05

## 2021-02-10 RX ORDER — METOPROLOL TARTRATE 50 MG/1
50 TABLET, FILM COATED ORAL EVERY 12 HOURS SCHEDULED
Qty: 180 TABLET | Refills: 3 | Status: SHIPPED | OUTPATIENT
Start: 2021-02-10 | End: 2022-02-05

## 2021-05-01 LAB
ALBUMIN SERPL-MCNC: 4.4 G/DL (ref 3.6–5.1)
ALBUMIN/GLOB SERPL: 1.5 (CALC) (ref 1–2.5)
ALP SERPL-CCNC: 93 U/L (ref 37–153)
ALT SERPL-CCNC: 22 U/L (ref 6–29)
AST SERPL-CCNC: 28 U/L (ref 10–35)
BASOPHILS # BLD AUTO: 41 CELLS/UL (ref 0–200)
BASOPHILS NFR BLD AUTO: 0.8 %
BILIRUB SERPL-MCNC: 0.8 MG/DL (ref 0.2–1.2)
BUN SERPL-MCNC: 28 MG/DL (ref 7–25)
BUN/CREAT SERPL: 21 (CALC) (ref 6–22)
CALCIUM SERPL-MCNC: 9.9 MG/DL (ref 8.6–10.4)
CHLORIDE SERPL-SCNC: 96 MMOL/L (ref 98–110)
CHOLEST SERPL-MCNC: 141 MG/DL
CHOLEST/HDLC SERPL: 2.7 (CALC)
CO2 SERPL-SCNC: 32 MMOL/L (ref 20–32)
CREAT SERPL-MCNC: 1.36 MG/DL (ref 0.6–0.88)
EOSINOPHIL # BLD AUTO: 194 CELLS/UL (ref 15–500)
EOSINOPHIL NFR BLD AUTO: 3.8 %
ERYTHROCYTE [DISTWIDTH] IN BLOOD BY AUTOMATED COUNT: 13.2 % (ref 11–15)
GLOBULIN SER CALC-MCNC: 2.9 G/DL (CALC) (ref 1.9–3.7)
GLUCOSE SERPL-MCNC: 136 MG/DL (ref 65–99)
HBA1C MFR BLD: 6.6 % OF TOTAL HGB
HCT VFR BLD AUTO: 44.2 % (ref 35–45)
HDLC SERPL-MCNC: 53 MG/DL
HGB BLD-MCNC: 14.6 G/DL (ref 11.7–15.5)
LDLC SERPL CALC-MCNC: 72 MG/DL (CALC)
LYMPHOCYTES # BLD AUTO: 1362 CELLS/UL (ref 850–3900)
LYMPHOCYTES NFR BLD AUTO: 26.7 %
MCH RBC QN AUTO: 31.3 PG (ref 27–33)
MCHC RBC AUTO-ENTMCNC: 33 G/DL (ref 32–36)
MCV RBC AUTO: 94.6 FL (ref 80–100)
MONOCYTES # BLD AUTO: 393 CELLS/UL (ref 200–950)
MONOCYTES NFR BLD AUTO: 7.7 %
NEUTROPHILS # BLD AUTO: 3111 CELLS/UL (ref 1500–7800)
NEUTROPHILS NFR BLD AUTO: 61 %
NONHDLC SERPL-MCNC: 88 MG/DL (CALC)
PLATELET # BLD AUTO: 198 THOUSAND/UL (ref 140–400)
PMV BLD REES-ECKER: 10.7 FL (ref 7.5–12.5)
POTASSIUM SERPL-SCNC: 3.7 MMOL/L (ref 3.5–5.3)
PROT SERPL-MCNC: 7.3 G/DL (ref 6.1–8.1)
RBC # BLD AUTO: 4.67 MILLION/UL (ref 3.8–5.1)
SL AMB EGFR AFRICAN AMERICAN: 41 ML/MIN/1.73M2
SL AMB EGFR NON AFRICAN AMERICAN: 36 ML/MIN/1.73M2
SODIUM SERPL-SCNC: 137 MMOL/L (ref 135–146)
TRIGL SERPL-MCNC: 77 MG/DL
TSH SERPL-ACNC: 2.76 MIU/L (ref 0.4–4.5)
WBC # BLD AUTO: 5.1 THOUSAND/UL (ref 3.8–10.8)

## 2021-05-06 ENCOUNTER — OFFICE VISIT (OUTPATIENT)
Dept: CARDIOLOGY CLINIC | Facility: CLINIC | Age: 86
End: 2021-05-06
Payer: MEDICARE

## 2021-05-06 VITALS
OXYGEN SATURATION: 97 % | HEART RATE: 68 BPM | WEIGHT: 150.6 LBS | HEIGHT: 64 IN | BODY MASS INDEX: 25.71 KG/M2 | SYSTOLIC BLOOD PRESSURE: 116 MMHG | DIASTOLIC BLOOD PRESSURE: 70 MMHG

## 2021-05-06 DIAGNOSIS — I25.10 CORONARY ARTERY DISEASE INVOLVING NATIVE CORONARY ARTERY OF NATIVE HEART WITHOUT ANGINA PECTORIS: ICD-10-CM

## 2021-05-06 DIAGNOSIS — I65.22 CAROTID ARTERY OBSTRUCTION, LEFT: Primary | ICD-10-CM

## 2021-05-06 DIAGNOSIS — I10 ESSENTIAL HYPERTENSION: ICD-10-CM

## 2021-05-06 DIAGNOSIS — I71.2 ANEURYSM OF ASCENDING AORTA (HCC): ICD-10-CM

## 2021-05-06 DIAGNOSIS — E78.2 MIXED HYPERLIPIDEMIA: ICD-10-CM

## 2021-05-06 PROCEDURE — 99214 OFFICE O/P EST MOD 30 MIN: CPT | Performed by: INTERNAL MEDICINE

## 2021-05-20 NOTE — PROGRESS NOTES
Cardiology Follow Up    Dorien Felty  1935  [id number]  [address] [facility] [address]  [phone]    1  Carotid artery obstruction, left  Ambulatory referral to Vascular Surgery   2  Aneurysm of ascending aorta (HCC)     3  Coronary artery disease involving native coronary artery of native heart without angina pectoris     4  Essential hypertension     5  Mixed hyperlipidemia         Interval History:   No new complaints  Denies chest pain shortness of breath orthopnea paroxysmal nocturnal dyspnea or focal neurological deficit      Patient Active Problem List   Diagnosis    Coronary artery disease involving native heart without angina pectoris    Thoracic aortic aneurysm without rupture ([place] [address] )    Hypertension    Carotid artery obstruction, left    Aneurysm of ascending aorta (HCC)    Arteriosclerosis of carotid artery    Carotid atherosclerosis    Chronic kidney disease, stage 3 ([place] [address] )    GERD without esophagitis    Hyperlipidemia    Impaired fasting glucose    Polyp of sigmoid colon    Medicare annual wellness visit, subsequent    Chronic left-sided low back pain without sciatica     Past Medical History:   Diagnosis Date    Aorta aneurysm ([place] [address] )     Cardiac disease     Fall 5/23/2019    GERD (gastroesophageal reflux disease)     Hematuria     last assessed: 10/28/2013    Hip pain, acute, left 5/23/2019    Shortness of breath     last assessed: 6/27/2013     Social History     Socioeconomic History    Marital status: /Civil Union     Spouse name: Not on file    Number of children: Not on file    Years of education: Not on file    Highest education level: Not on file   Occupational History    Not on file   Social Needs    Financial resource strain: Not on file    Food insecurity     Worry: Not on file     Inability: Not on file    Transportation needs     Medical: Not on file Non-medical: Not on file   Tobacco Use    Smoking status: Former Smoker    Smokeless tobacco: Never Used   Substance and Sexual Activity    Alcohol use: No    Drug use: No    Sexual activity: Never   Lifestyle    Physical activity     Days per week: Not on file     Minutes per session: Not on file    Stress: Not on file   Relationships    Social connections     Talks on phone: Not on file     Gets together: Not on file     Attends Mandaeism service: Not on file     Active member of club or organization: Not on file     Attends meetings of clubs or organizations: Not on file     Relationship status: Not on file    Intimate partner violence     Fear of current or ex partner: Not on file     Emotionally abused: Not on file     Physically abused: Not on file     Forced sexual activity: Not on file   Other Topics Concern    Not on file   Social History Narrative    Daily coffee consumption (1 cups/day)      Family History   Problem Relation Age of Onset    Diabetes Mother         mellitus    Glaucoma Mother     Hypertension Mother     Macular degeneration Mother     Stroke Father         syndrome    Hypertension Father     Cancer Sister     Dementia Sister     Heart disease Sister     No Known Problems Child     No Known Problems Child     No Known Problems Sister     No Known Problems Sister     No Known Problems Sister     No Known Problems Sister     Breast cancer Sister 36    No Known Problems Maternal Grandmother     No Known Problems Maternal Grandfather     No Known Problems Paternal Grandmother     No Known Problems Paternal Grandfather     No Known Problems Sister     Cancer Maternal Aunt     Cancer Maternal Aunt     Cancer Maternal Aunt     No Known Problems Maternal Aunt     No Known Problems Maternal Aunt     No Known Problems Maternal Aunt     No Known Problems Paternal Aunt     No Known Problems Paternal Aunt     No Known Problems Paternal Aunt     No Known Problems Paternal Aunt     No Known Problems Paternal Aunt      Past Surgical History:   Procedure Laterality Date    ADENOIDECTOMY      APPENDECTOMY      BREAST BIOPSY Left     BREAST LUMPECTOMY Left     CATARACT EXTRACTION      CERVICAL FUSION      HYSTERECTOMY      OOPHORECTOMY      OTHER SURGICAL HISTORY      paravaginal defect graft-reinforced repair    REPAIR RECTOCELE      TONSILLECTOMY      VEIN LIGATION AND STRIPPING Bilateral        Current Outpatient Medications:     atorvastatin (LIPITOR) 10 mg tablet, TAKE 1 TABLET(10 MG) BY MOUTH DAILY, Disp: 90 tablet, Rfl: 5    metoprolol tartrate (LOPRESSOR) 50 mg tablet, Take 1 tablet (50 mg total) by mouth every 12 (twelve) hours, Disp: 180 tablet, Rfl: 3    omeprazole (PriLOSEC) 20 mg delayed release capsule, Take 1 capsule (20 mg total) by mouth daily, Disp: 90 capsule, Rfl: 3    triamterene-hydrochlorothiazide (MAXZIDE-25) 37 5-25 mg per tablet, Take 1 tablet by mouth daily, Disp: 90 tablet, Rfl: 3  No Known Allergies    Labs:  Orders Only on 04/30/2021   Component Date Value    Total Cholesterol 04/30/2021 141     HDL 04/30/2021 53     Triglycerides 04/30/2021 77     LDL Calculated 04/30/2021 72     Chol HDLC Ratio 04/30/2021 2 7     Non-HDL Cholesterol 04/30/2021 88     Glucose, Random 04/30/2021 136*    BUN 04/30/2021 28*    Creatinine 04/30/2021 1 36*    eGFR Non  04/30/2021 36*    eGFR  04/30/2021 41*    SL AMB BUN/CREATININE RA* 04/30/2021 21     Sodium 04/30/2021 137     Potassium 04/30/2021 3 7     Chloride 04/30/2021 96*    CO2 04/30/2021 32     Calcium 04/30/2021 9 9     Protein, Total 04/30/2021 7 3     Albumin 04/30/2021 4 4     Globulin 04/30/2021 2 9     Albumin/Globulin Ratio 04/30/2021 1 5     TOTAL BILIRUBIN 04/30/2021 0 8     Alkaline Phosphatase 04/30/2021 93     AST 04/30/2021 28     ALT 04/30/2021 22     White Blood Cell Count 04/30/2021 5 1     Red Blood Cell Count 04/30/2021 4 67     Hemoglobin 04/30/2021 14 6     HCT 04/30/2021 44 2     MCV 04/30/2021 94 6     MCH 04/30/2021 31 3     MCHC 04/30/2021 33 0     RDW 04/30/2021 13 2     Platelet Count 71/18/0775 198     SL AMB MPV 04/30/2021 10 7     Neutrophils (Absolute) 04/30/2021 3,111     Lymphocytes (Absolute) 04/30/2021 1,362     Monocytes (Absolute) 04/30/2021 393     Eosinophils (Absolute) 04/30/2021 194     Basophils ABS 04/30/2021 41     Neutrophils 04/30/2021 61     Lymphocytes 04/30/2021 26 7     Monocytes 04/30/2021 7 7     Eosinophils 04/30/2021 3 8     Basophils PCT 04/30/2021 0 8     TSH W/RFX TO FREE T4 04/30/2021 2 76     Hemoglobin A1C 04/30/2021 6 6*     Imaging: No results found  Review of Systems:  Review of Systems   Constitutional: Negative for fatigue  HENT: Negative for hearing loss  Eyes: Negative for discharge  Respiratory: Negative for shortness of breath  Cardiovascular: Negative for chest pain, palpitations and leg swelling  Gastrointestinal: Negative for abdominal pain  Endocrine: Negative for polyuria  Genitourinary: Negative for dysuria  Musculoskeletal: Negative for back pain and myalgias  Skin: Negative for rash  Neurological: Negative for syncope  Hematological: Does not bruise/bleed easily  Psychiatric/Behavioral: Negative for confusion and sleep disturbance  Physical Exam:  Physical Exam  Vitals signs and nursing note reviewed  Constitutional:       Appearance: She is well-developed  HENT:      Head: Normocephalic and atraumatic  Neck:      Musculoskeletal: Normal range of motion and neck supple  Vascular: No JVD  Cardiovascular:      Rate and Rhythm: Normal rate and regular rhythm  Pulses:           Carotid pulses are on the right side with bruit and on the left side with bruit  Heart sounds: Normal heart sounds  Pulmonary:      Effort: Pulmonary effort is normal       Breath sounds: Normal breath sounds  Abdominal:      General: Bowel sounds are normal       Palpations: Abdomen is soft  Musculoskeletal: Normal range of motion  Skin:     General: Skin is warm and dry  Neurological:      Mental Status: She is alert and oriented to person, place, and time  Psychiatric:         Behavior: Behavior normal          Thought Content: Thought content normal          Judgment: Judgment normal          Discussion/Summary:  She is asymptomatic and functional class 1  Her most recent carotid duplex shows a lesion of greater than 70% on the left  She is on good medical regimen that includes moderate dose statin with an LDL level of 72 and good blood pressure control as well as aspirin therapy I will ask the vascular surgeon for an opinion on carotid endarterectomy on verses continued aggressive medical therapy  She has an ascending aortic aneurysm that is no longer being followed by imaging as surgery did not feel at age 81 she never be surgical candidate for replacement of her ascending aorta  I have made no changes I will see her in 6 months

## 2021-06-01 ENCOUNTER — OFFICE VISIT (OUTPATIENT)
Dept: FAMILY MEDICINE CLINIC | Facility: CLINIC | Age: 86
End: 2021-06-01
Payer: MEDICARE

## 2021-06-01 VITALS
HEIGHT: 64 IN | WEIGHT: 148 LBS | HEART RATE: 58 BPM | SYSTOLIC BLOOD PRESSURE: 108 MMHG | DIASTOLIC BLOOD PRESSURE: 84 MMHG | RESPIRATION RATE: 16 BRPM | BODY MASS INDEX: 25.27 KG/M2 | OXYGEN SATURATION: 97 %

## 2021-06-01 DIAGNOSIS — I71.2 THORACIC AORTIC ANEURYSM WITHOUT RUPTURE (HCC): ICD-10-CM

## 2021-06-01 DIAGNOSIS — I65.22 CAROTID ARTERY OBSTRUCTION, LEFT: ICD-10-CM

## 2021-06-01 DIAGNOSIS — E78.2 MIXED HYPERLIPIDEMIA: ICD-10-CM

## 2021-06-01 DIAGNOSIS — N18.32 STAGE 3B CHRONIC KIDNEY DISEASE (HCC): ICD-10-CM

## 2021-06-01 DIAGNOSIS — I10 ESSENTIAL HYPERTENSION: ICD-10-CM

## 2021-06-01 DIAGNOSIS — R73.01 IMPAIRED FASTING GLUCOSE: Primary | ICD-10-CM

## 2021-06-01 PROCEDURE — 99214 OFFICE O/P EST MOD 30 MIN: CPT | Performed by: FAMILY MEDICINE

## 2021-06-01 NOTE — PROGRESS NOTES
Assessment/Plan:    1  Impaired fasting glucose  Assessment & Plan:  Watching diet  Will hold off on meds    Orders:  -     Hemoglobin A1C With EAG; Future; Expected date: 11/01/2021  -     Hemoglobin A1C With EAG    2  Essential hypertension  Assessment & Plan:  Stable on current meds    Orders:  -     Comprehensive metabolic panel; Future; Expected date: 11/01/2021  -     CBC; Future; Expected date: 11/01/2021  -     TSH, 3rd generation with Free T4 reflex; Future; Expected date: 11/01/2021  -     Comprehensive metabolic panel  -     CBC  -     TSH, 3rd generation with Free T4 reflex    3  Thoracic aortic aneurysm without rupture (HCC)  Assessment & Plan:  No change      4  Stage 3b chronic kidney disease [facility])  Assessment & Plan:  Lab Results   Component Value Date    CREATININE 1 36 (H) 04/30/2021    CREATININE 1 47 (H) 11/10/2020    CREATININE 1 35 (H) 04/01/2020   keep hydrated  Avoid nephrotoxics      5  Mixed hyperlipidemia  Assessment & Plan:  Stable lipids  On lipitor    Orders:  -     Lipid Panel with Direct LDL reflex; Future; Expected date: 11/01/2021  -     TSH, 3rd generation with Free T4 reflex; Future; Expected date: 11/01/2021  -     Lipid Panel with Direct LDL reflex  -     TSH, 3rd generation with Free T4 reflex    6  Carotid artery obstruction, left  Assessment & Plan:  Dr Anurag Welch referred to vascular  Waiting on appt      BMI Counseling: Body mass index is 25 4 kg/m²  The BMI is above normal  Nutrition recommendations include encouraging healthy choices of fruits and vegetables  Exercise recommendations include exercising 3-5 times per week  No pharmacotherapy was ordered  Patient Instructions   MEDICARE [facility] [phone]      No follow-ups on file  Subjective:      Patient ID: Parth Edwards is a 81 y o  female      Chief Complaint   Patient presents with    Follow-up     6 month f/u       Here for follow up  Not walking a much   Labs reviewed      Hypertension  This is a chronic problem  The current episode started more than 1 year ago  The problem is unchanged  The problem is controlled  There are no associated agents to hypertension  Risk factors for coronary artery disease include diabetes mellitus and dyslipidemia  Hyperlipidemia  This is a chronic problem  The current episode started more than 1 year ago  The problem is controlled  Recent lipid tests were reviewed and are normal  Current antihyperlipidemic treatment includes statins  The current treatment provides significant improvement of lipids  There are no compliance problems  The following portions of the patient's history were reviewed and updated as appropriate: allergies, current medications, past family history, past medical history, past social history, past surgical history and problem list     Review of Systems   Constitutional: Negative  HENT: Negative  Eyes: Negative  Respiratory: Negative  Cardiovascular: Negative  Gastrointestinal: Negative  Endocrine: Negative  Genitourinary: Negative  Musculoskeletal: Positive for arthralgias (knee pain)  Allergic/Immunologic: Negative  Neurological: Negative  Hematological: Negative  Psychiatric/Behavioral: Negative  Current Outpatient Medications   Medication Sig Dispense Refill    atorvastatin (LIPITOR) 10 mg tablet TAKE 1 TABLET(10 MG) BY MOUTH DAILY 90 tablet 5    metoprolol tartrate (LOPRESSOR) 50 mg tablet Take 1 tablet (50 mg total) by mouth every 12 (twelve) hours 180 tablet 3    omeprazole (PriLOSEC) 20 mg delayed release capsule Take 1 capsule (20 mg total) by mouth daily 90 capsule 3    triamterene-hydrochlorothiazide (MAXZIDE-25) 37 5-25 mg per tablet Take 1 tablet by mouth daily 90 tablet 3     No current facility-administered medications for this visit          Objective:    /84   Pulse 58   Resp 16   Ht 5' 4" (1 626 m)   Wt 67 1 kg (148 lb)   SpO2 97%   BMI 25 40 kg/m²        Physical Exam  Vitals signs and nursing note reviewed  Constitutional:       Appearance: Normal appearance  HENT:      Head: Normocephalic and atraumatic  Eyes:      Extraocular Movements: Extraocular movements intact  Pupils: Pupils are equal, round, and reactive to light  Neck:      Musculoskeletal: Normal range of motion and neck supple  Cardiovascular:      Rate and Rhythm: Normal rate and regular rhythm  Pulses: Normal pulses  Heart sounds: Normal heart sounds  Pulmonary:      Effort: Pulmonary effort is normal       Breath sounds: Normal breath sounds  Abdominal:      General: Abdomen is flat  Palpations: Abdomen is soft  Musculoskeletal: Normal range of motion  Skin:     General: Skin is warm  Capillary Refill: Capillary refill takes less than 2 seconds  Neurological:      General: No focal deficit present  Mental Status: She is alert and oriented to person, place, and time  Psychiatric:         Mood and Affect: Mood normal          Behavior: Behavior normal          Thought Content:  Thought content normal          Judgment: Judgment normal                 Eddie Velez DO

## 2021-06-01 NOTE — ASSESSMENT & PLAN NOTE
Lab Results   Component Value Date    CREATININE 1 36 (H) 04/30/2021    CREATININE 1 47 (H) 11/10/2020    CREATININE 1 35 (H) 04/01/2020   keep hydrated  Avoid nephrotoxics

## 2021-06-14 DIAGNOSIS — I25.10 CORONARY ARTERY DISEASE INVOLVING NATIVE HEART WITHOUT ANGINA PECTORIS, UNSPECIFIED VESSEL OR LESION TYPE: ICD-10-CM

## 2021-06-14 DIAGNOSIS — E78.2 MIXED HYPERLIPIDEMIA: ICD-10-CM

## 2021-06-15 RX ORDER — ATORVASTATIN CALCIUM 10 MG/1
10 TABLET, FILM COATED ORAL DAILY
Qty: 90 TABLET | Refills: 3 | Status: SHIPPED | OUTPATIENT
Start: 2021-06-15 | End: 2022-06-06

## 2021-08-20 ENCOUNTER — TELEPHONE (OUTPATIENT)
Dept: FAMILY MEDICINE CLINIC | Facility: CLINIC | Age: 86
End: 2021-08-20

## 2021-08-20 NOTE — TELEPHONE ENCOUNTER
Called patient back and tried to schedule an appointment and she got upset that I told her she had to come in and did not want to make and appointment, and hung up the phone

## 2021-08-20 NOTE — TELEPHONE ENCOUNTER
Patient called and stated that she has a UTI, she has frequency urination, and burning and she wanted to know if something could be sent to the pharmacy for her   Please advise        Pharmacy [facility] [address]

## 2021-08-20 NOTE — TELEPHONE ENCOUNTER
Contacted the PT back and let her know about why we schedule an appt and that we test her urine and also send it out  I explain to her that every infection is different and we need to use the best ABX to fight the infection  We can not send over just a generic on and hope it works  PT understood    But she stated I would not come over for any kind of test  I stated if it gets worse to go to urgent care and she said the hospital

## 2021-08-23 ENCOUNTER — OFFICE VISIT (OUTPATIENT)
Dept: FAMILY MEDICINE CLINIC | Facility: CLINIC | Age: 86
End: 2021-08-23
Payer: MEDICARE

## 2021-08-23 VITALS
HEART RATE: 72 BPM | RESPIRATION RATE: 16 BRPM | SYSTOLIC BLOOD PRESSURE: 104 MMHG | WEIGHT: 143.8 LBS | TEMPERATURE: 97.8 F | DIASTOLIC BLOOD PRESSURE: 72 MMHG | BODY MASS INDEX: 24.55 KG/M2 | HEIGHT: 64 IN | OXYGEN SATURATION: 97 %

## 2021-08-23 DIAGNOSIS — R30.0 DYSURIA: Primary | ICD-10-CM

## 2021-08-23 LAB
SL AMB  POCT GLUCOSE, UA: ABNORMAL
SL AMB LEUKOCYTE ESTERASE,UA: ABNORMAL
SL AMB POCT BILIRUBIN,UA: ABNORMAL
SL AMB POCT BLOOD,UA: ABNORMAL
SL AMB POCT CLARITY,UA: ABNORMAL
SL AMB POCT COLOR,UA: YELLOW
SL AMB POCT KETONES,UA: ABNORMAL
SL AMB POCT NITRITE,UA: ABNORMAL
SL AMB POCT PH,UA: 6
SL AMB POCT SPECIFIC GRAVITY,UA: 1.02
SL AMB POCT URINE PROTEIN: ABNORMAL
SL AMB POCT UROBILINOGEN: 0.2

## 2021-08-23 PROCEDURE — 99213 OFFICE O/P EST LOW 20 MIN: CPT | Performed by: FAMILY MEDICINE

## 2021-08-23 PROCEDURE — 87077 CULTURE AEROBIC IDENTIFY: CPT | Performed by: FAMILY MEDICINE

## 2021-08-23 PROCEDURE — 87186 SC STD MICRODIL/AGAR DIL: CPT | Performed by: FAMILY MEDICINE

## 2021-08-23 PROCEDURE — 81002 URINALYSIS NONAUTO W/O SCOPE: CPT | Performed by: FAMILY MEDICINE

## 2021-08-23 PROCEDURE — 87086 URINE CULTURE/COLONY COUNT: CPT | Performed by: FAMILY MEDICINE

## 2021-08-23 RX ORDER — CIPROFLOXACIN 250 MG/1
250 TABLET, FILM COATED ORAL EVERY 12 HOURS SCHEDULED
Qty: 14 TABLET | Refills: 0 | Status: SHIPPED | OUTPATIENT
Start: 2021-08-23 | End: 2022-01-05 | Stop reason: SDUPTHER

## 2021-08-23 NOTE — PROGRESS NOTES
Assessment/Plan:    1  Dysuria  Assessment & Plan:  Start cipro   Check culture    Orders:  -     POCT urine dip  -     ciprofloxacin (CIPRO) 250 mg tablet; Take 1 tablet (250 mg total) by mouth every 12 (twelve) hours for 7 days  -     Urine culture       Recent Results (from the past 24 hour(s))   POCT urine dip    Collection Time: 08/23/21 10:17 AM   Result Value Ref Range    LEUKOCYTE ESTERASE,UA 3+     NITRITE,UA +     SL AMB POCT UROBILINOGEN 0 2     POCT URINE PROTEIN 2+      PH,UA 6 0     BLOOD,UA 2+     SPECIFIC GRAVITY,UA 1 025     KETONES,UA neg     BILIRUBIN,UA neg     GLUCOSE, UA neg      COLOR,UA yellow     CLARITY,UA very cloudy and thick      There are no Patient Instructions on file for this visit  No follow-ups on file  Subjective:      Patient ID: Kayla Harrell is a 81 y o  female  Chief Complaint   Patient presents with    Difficulty Urinating     burning when urinating, loss of appetitite       Started last week with burning, frequency and then incontience  Didn't have ride to get here  Drinking water  Cranberry juice    Difficulty Urinating   This is a new problem  The problem occurs every urination  The problem has been gradually improving  The pain is moderate  She is not sexually active  There is no history of pyelonephritis  Associated symptoms include frequency and urgency  Pertinent negatives include no hematuria  She has tried increased fluids for the symptoms  The treatment provided mild relief  The following portions of the patient's history were reviewed and updated as appropriate:  past social history    Review of Systems   Constitutional: Negative  HENT: Negative  Eyes: Negative  Respiratory: Negative  Cardiovascular: Negative  Gastrointestinal: Negative  Endocrine: Negative  Genitourinary: Positive for dysuria, frequency and urgency  Negative for hematuria  Musculoskeletal: Negative  Allergic/Immunologic: Negative      Neurological: Negative  Hematological: Negative  Psychiatric/Behavioral: Negative  Current Outpatient Medications   Medication Sig Dispense Refill    atorvastatin (LIPITOR) 10 mg tablet Take 1 tablet (10 mg total) by mouth daily 90 tablet 3    ciprofloxacin (CIPRO) 250 mg tablet Take 1 tablet (250 mg total) by mouth every 12 (twelve) hours for 7 days 14 tablet 0    metoprolol tartrate (LOPRESSOR) 50 mg tablet Take 1 tablet (50 mg total) by mouth every 12 (twelve) hours 180 tablet 3    omeprazole (PriLOSEC) 20 mg delayed release capsule Take 1 capsule (20 mg total) by mouth daily 90 capsule 3    triamterene-hydrochlorothiazide (MAXZIDE-25) 37 5-25 mg per tablet Take 1 tablet by mouth daily 90 tablet 3     No current facility-administered medications for this visit  Objective:    /72   Pulse 72   Temp 97 8 °F (36 6 °C) (Tympanic)   Resp 16   Ht 5' 4" (1 626 m)   Wt 65 2 kg (143 lb 12 8 oz)   SpO2 97%   BMI 24 68 kg/m²      Physical Exam  Vitals and nursing note reviewed  Constitutional:       Appearance: Normal appearance  HENT:      Head: Normocephalic and atraumatic  Eyes:      Extraocular Movements: Extraocular movements intact  Conjunctiva/sclera: Conjunctivae normal       Pupils: Pupils are equal, round, and reactive to light  Cardiovascular:      Rate and Rhythm: Normal rate and regular rhythm  Pulses: Normal pulses  Heart sounds: Normal heart sounds  Pulmonary:      Effort: Pulmonary effort is normal       Breath sounds: Normal breath sounds  Abdominal:      General: Abdomen is flat  Tenderness: There is no abdominal tenderness  Musculoskeletal:      Cervical back: Normal range of motion and neck supple  Skin:     General: Skin is warm  Capillary Refill: Capillary refill takes less than 2 seconds  Neurological:      General: No focal deficit present  Mental Status: She is alert and oriented to person, place, and time     Psychiatric: Mood and Affect: Mood normal          Behavior: Behavior normal          Thought Content:  Thought content normal          Judgment: Judgment normal              Yadiel Ok, DO

## 2021-08-23 NOTE — ED PROVIDER NOTES
History  Chief Complaint   Patient presents with    Back Pain     moving furniture last week and her back hurt and not getting beter  Placed on 5mg flexeril with out any relief     43-year-old female presents to the emergency department with complaints of lower back pain  States that she was moving a bed at home last week and started to have pain 1-2 days later  Seen by her family doctor previously and was given a prescription for Flexeril which she has been using without much relief  States the pain is fairly constant but worse with movement  States the pain seems to be worse in the lower back and on the right side without radiation into the leg  No history of similar symptoms  States she is currently unable to take other pain medications because she is due to have a small procedure on her eye in 4 days  History provided by:  Patient   used: No    Back Pain   Location:  Lumbar spine  Quality:  Aching  Radiates to:  Does not radiate  Pain severity:  Mild  Pain is:  Same all the time  Onset quality:  Gradual  Duration:  1 week  Timing:  Constant  Progression:  Waxing and waning  Chronicity:  New  Context: lifting heavy objects    Relieved by:  Nothing  Worsened by: Movement  Ineffective treatments:  Muscle relaxants  Associated symptoms: no abdominal pain, no abdominal swelling, no bladder incontinence, no bowel incontinence, no chest pain, no dysuria, no fever, no headaches, no leg pain, no numbness, no paresthesias, no pelvic pain, no perianal numbness, no tingling, no weakness and no weight loss        Prior to Admission Medications   Prescriptions Last Dose Informant Patient Reported?  Taking?   aspirin 81 MG tablet 12/7/2017 at Unknown time  Yes Yes   Sig: Take by mouth   metoprolol tartrate (LOPRESSOR) 50 mg tablet 12/7/2017 at Unknown time  Yes Yes   Sig: Take by mouth   omeprazole (PriLOSEC) 20 mg delayed release capsule 12/7/2017 at Unknown time  Yes Yes   Sig: Take 20 mg MA to call Rebecca in on visit with Dr Moreira to discuss questions/concerns.   by mouth daily   triamterene-hydrochlorothiazide (MAXZIDE-25) 37 5-25 mg per tablet 12/7/2017 at Unknown time  Yes Yes   Sig: Take by mouth      Facility-Administered Medications: None       Past Medical History:   Diagnosis Date    Cardiac disease     GERD (gastroesophageal reflux disease)        History reviewed  No pertinent surgical history  History reviewed  No pertinent family history  I have reviewed and agree with the history as documented  Social History   Substance Use Topics    Smoking status: Former Smoker    Smokeless tobacco: Not on file    Alcohol use No        Review of Systems   Constitutional: Negative for activity change, fever and weight loss  Cardiovascular: Negative for chest pain  Gastrointestinal: Negative for abdominal pain, bowel incontinence, nausea and vomiting  Genitourinary: Negative for bladder incontinence, decreased urine volume (no urinary incontinence ), dysuria, flank pain and pelvic pain  Musculoskeletal: Positive for back pain  Skin: Negative for rash  Neurological: Negative for tingling, weakness, numbness, headaches and paresthesias  Physical Exam  ED Triage Vitals [12/07/17 1031]   Temperature Pulse Respirations Blood Pressure SpO2   (!) 96 8 °F (36 °C) 58 18 138/65 95 %      Temp Source Heart Rate Source Patient Position - Orthostatic VS BP Location FiO2 (%)   Tympanic Monitor Sitting -- --      Pain Score       9           Orthostatic Vital Signs  Vitals:    12/07/17 1031   BP: 138/65   Pulse: 58   Patient Position - Orthostatic VS: Sitting       Physical Exam   Constitutional: She is oriented to person, place, and time  She appears well-developed and well-nourished  HENT:   Head: Normocephalic and atraumatic  Cardiovascular: Normal rate, regular rhythm and normal heart sounds  Exam reveals no gallop and no friction rub  No murmur heard    Pulmonary/Chest: Effort normal and breath sounds normal    Musculoskeletal: She exhibits no edema or deformity  Lumbar back: She exhibits decreased range of motion, tenderness, pain and spasm  She exhibits no bony tenderness, no swelling, no edema and no deformity  Neurological: She is alert and oriented to person, place, and time  She has normal reflexes  Psychiatric: She has a normal mood and affect  Her behavior is normal    Nursing note and vitals reviewed  ED Medications  Medications   lidocaine (LIDODERM) 5 % patch 1 patch (1 patch Transdermal Medication Applied 12/7/17 1101)   acetaminophen (TYLENOL) tablet 650 mg (650 mg Oral Given 12/7/17 1101)       Diagnostic Studies  Results Reviewed     None                 XR lumbar spine 2 or 3 views   ED Interpretation by Nargis Sharif PA-C (12/07 1137)   No fracture  Final Result by Bony Harris MD (12/07 1134)   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 0 Filed at: 12/07/2017 1036   In the last 24 hours, have you needed more help than usual?  0 Filed at: 12/07/2017 1036   Have you been hospitalized for one or more nights during the past 6 months? No data   In general, do you see well?  0 Filed at: 12/07/2017 1036   In general, do you have serious problems with your memory?   0 Filed at: 12/07/2017 1036   Do you take more than three different medications every day?  0 Filed at: 12/07/2017 1036   ISAR Score  0 Filed at: 12/07/2017 1036                          MDM  Number of Diagnoses or Management Options  Lumbar sprain, initial encounter:   Diagnosis management comments: Differential diagnosis includes but not limited to:  Lumbar strain, arthritis, compression fracture       Amount and/or Complexity of Data Reviewed  Tests in the radiology section of CPT®: ordered and reviewed  Independent visualization of images, tracings, or specimens: yes      CritCare Time    Disposition  Final diagnoses:   Lumbar sprain, initial encounter     Time reflects when diagnosis was documented in both MDM as applicable and the Disposition within this note     Time User Action Codes Description Comment    12/7/2017 11:37 AM Augie Vail [S33  5XXA] Lumbar sprain, initial encounter       ED Disposition     ED Disposition Condition Comment    Discharge  Dalton April discharge to home/self care  Condition at discharge: Stable        Follow-up Information     Follow up With Specialties Details Why Americo Gamboa  Schedule an appointment as soon as possible for a visit  [address]  [phone]        Discharge Medication List as of 12/7/2017 11:38 AM      START taking these medications    Details   lidocaine (LIDODERM) 5 % Place 1 patch on the skin daily Remove & Discard patch within 12 hours or as directed by MD, Starting Thu 12/7/2017, Print         CONTINUE these medications which have NOT CHANGED    Details   aspirin 81 MG tablet Take by mouth, Historical Med      metoprolol tartrate (LOPRESSOR) 50 mg tablet Take by mouth, Starting Mon 7/25/2016, Historical Med      omeprazole (PriLOSEC) 20 mg delayed release capsule Take 20 mg by mouth daily, Historical Med      triamterene-hydrochlorothiazide (MAXZIDE-25) 37 5-25 mg per tablet Take by mouth, Starting Fri 9/16/2016, Historical Med           No discharge procedures on file      ED Provider  Electronically Signed by           Veto Monroe PA-C  12/07/17 1200

## 2021-08-25 LAB — BACTERIA UR CULT: ABNORMAL

## 2021-10-05 ENCOUNTER — IMMUNIZATIONS (OUTPATIENT)
Dept: FAMILY MEDICINE CLINIC | Facility: HOSPITAL | Age: 86
End: 2021-10-05

## 2021-10-05 DIAGNOSIS — Z23 ENCOUNTER FOR IMMUNIZATION: Primary | ICD-10-CM

## 2021-10-05 PROCEDURE — 0001A SARS-COV-2 / COVID-19 MRNA VACCINE (PFIZER-BIONTECH) 30 MCG: CPT

## 2021-10-05 PROCEDURE — 91300 SARS-COV-2 / COVID-19 MRNA VACCINE (PFIZER-BIONTECH) 30 MCG: CPT

## 2021-10-16 ENCOUNTER — IMMUNIZATIONS (OUTPATIENT)
Dept: FAMILY MEDICINE CLINIC | Facility: CLINIC | Age: 86
End: 2021-10-16
Payer: MEDICARE

## 2021-10-16 DIAGNOSIS — Z23 ENCOUNTER FOR IMMUNIZATION: Primary | ICD-10-CM

## 2021-10-16 PROCEDURE — G0008 ADMIN INFLUENZA VIRUS VAC: HCPCS

## 2021-10-16 PROCEDURE — 90662 IIV NO PRSV INCREASED AG IM: CPT

## 2021-11-17 LAB
ALBUMIN SERPL-MCNC: 4.2 G/DL (ref 3.6–5.1)
ALBUMIN/GLOB SERPL: 1.4 (CALC) (ref 1–2.5)
ALP SERPL-CCNC: 98 U/L (ref 37–153)
ALT SERPL-CCNC: 15 U/L (ref 6–29)
AST SERPL-CCNC: 20 U/L (ref 10–35)
BILIRUB SERPL-MCNC: 0.7 MG/DL (ref 0.2–1.2)
BUN SERPL-MCNC: 24 MG/DL (ref 7–25)
BUN/CREAT SERPL: 20 (CALC) (ref 6–22)
CALCIUM SERPL-MCNC: 10 MG/DL (ref 8.6–10.4)
CHLORIDE SERPL-SCNC: 98 MMOL/L (ref 98–110)
CHOLEST SERPL-MCNC: 142 MG/DL
CHOLEST/HDLC SERPL: 2.4 (CALC)
CO2 SERPL-SCNC: 30 MMOL/L (ref 20–32)
CREAT SERPL-MCNC: 1.2 MG/DL (ref 0.6–0.88)
ERYTHROCYTE [DISTWIDTH] IN BLOOD BY AUTOMATED COUNT: 12.6 % (ref 11–15)
EST. AVERAGE GLUCOSE BLD GHB EST-MCNC: 134 (CALC)
EST. AVERAGE GLUCOSE BLD GHB EST-SCNC: 7.4 (CALC)
GLOBULIN SER CALC-MCNC: 3.1 G/DL (CALC) (ref 1.9–3.7)
GLUCOSE SERPL-MCNC: 135 MG/DL (ref 65–99)
HBA1C MFR BLD: 6.3 % OF TOTAL HGB
HCT VFR BLD AUTO: 41.6 % (ref 35–45)
HDLC SERPL-MCNC: 60 MG/DL
HGB BLD-MCNC: 14 G/DL (ref 11.7–15.5)
LDLC SERPL CALC-MCNC: 65 MG/DL (CALC)
MCH RBC QN AUTO: 31.7 PG (ref 27–33)
MCHC RBC AUTO-ENTMCNC: 33.7 G/DL (ref 32–36)
MCV RBC AUTO: 94.3 FL (ref 80–100)
NONHDLC SERPL-MCNC: 82 MG/DL (CALC)
PLATELET # BLD AUTO: 187 THOUSAND/UL (ref 140–400)
PMV BLD REES-ECKER: 10.5 FL (ref 7.5–12.5)
POTASSIUM SERPL-SCNC: 3.9 MMOL/L (ref 3.5–5.3)
PROT SERPL-MCNC: 7.3 G/DL (ref 6.1–8.1)
RBC # BLD AUTO: 4.41 MILLION/UL (ref 3.8–5.1)
SL AMB EGFR AFRICAN AMERICAN: 48 ML/MIN/1.73M2
SL AMB EGFR NON AFRICAN AMERICAN: 41 ML/MIN/1.73M2
SODIUM SERPL-SCNC: 138 MMOL/L (ref 135–146)
TRIGL SERPL-MCNC: 85 MG/DL
TSH SERPL-ACNC: 1.99 MIU/L (ref 0.4–4.5)
WBC # BLD AUTO: 5.2 THOUSAND/UL (ref 3.8–10.8)

## 2021-11-29 ENCOUNTER — OFFICE VISIT (OUTPATIENT)
Dept: FAMILY MEDICINE CLINIC | Facility: CLINIC | Age: 86
End: 2021-11-29
Payer: MEDICARE

## 2021-11-29 VITALS
SYSTOLIC BLOOD PRESSURE: 122 MMHG | HEIGHT: 63 IN | DIASTOLIC BLOOD PRESSURE: 70 MMHG | WEIGHT: 142.6 LBS | BODY MASS INDEX: 25.27 KG/M2 | RESPIRATION RATE: 16 BRPM | OXYGEN SATURATION: 98 % | HEART RATE: 58 BPM | TEMPERATURE: 97.6 F

## 2021-11-29 DIAGNOSIS — Z00.00 MEDICARE ANNUAL WELLNESS VISIT, SUBSEQUENT: Primary | ICD-10-CM

## 2021-11-29 DIAGNOSIS — K21.9 GERD WITHOUT ESOPHAGITIS: ICD-10-CM

## 2021-11-29 DIAGNOSIS — I10 PRIMARY HYPERTENSION: ICD-10-CM

## 2021-11-29 DIAGNOSIS — R73.01 IMPAIRED FASTING GLUCOSE: ICD-10-CM

## 2021-11-29 DIAGNOSIS — N18.32 STAGE 3B CHRONIC KIDNEY DISEASE (HCC): ICD-10-CM

## 2021-11-29 DIAGNOSIS — Z12.31 ENCOUNTER FOR SCREENING MAMMOGRAM FOR MALIGNANT NEOPLASM OF BREAST: ICD-10-CM

## 2021-11-29 DIAGNOSIS — E78.2 MIXED HYPERLIPIDEMIA: ICD-10-CM

## 2021-11-29 PROBLEM — R30.0 DYSURIA: Status: RESOLVED | Noted: 2021-08-23 | Resolved: 2021-11-29

## 2021-11-29 PROCEDURE — G0439 PPPS, SUBSEQ VISIT: HCPCS | Performed by: FAMILY MEDICINE

## 2022-01-03 ENCOUNTER — HOSPITAL ENCOUNTER (OUTPATIENT)
Dept: RADIOLOGY | Age: 87
Discharge: HOME | End: 2022-01-03
Payer: MEDICARE

## 2022-01-03 ENCOUNTER — TELEPHONE (OUTPATIENT)
Dept: FAMILY MEDICINE CLINIC | Facility: CLINIC | Age: 87
End: 2022-01-03

## 2022-01-03 VITALS — WEIGHT: 142 LBS | HEIGHT: 63 IN | BODY MASS INDEX: 25.16 KG/M2

## 2022-01-03 DIAGNOSIS — Z12.31 ENCOUNTER FOR SCREENING MAMMOGRAM FOR MALIGNANT NEOPLASM OF BREAST: ICD-10-CM

## 2022-01-03 PROCEDURE — 77067 SCR MAMMO BI INCL CAD: CPT

## 2022-01-03 PROCEDURE — 77063 BREAST TOMOSYNTHESIS BI: CPT

## 2022-01-03 NOTE — TELEPHONE ENCOUNTER
Spoke to patient she had been having burning with urination and frequency of urination but it wouldn't come out  Cranberry help her out for the last 3 days  She want to know if an antibiotic can be called in for her  She said it was ok to run this by Dr Lu Carroll tomorrow

## 2022-01-05 ENCOUNTER — OFFICE VISIT (OUTPATIENT)
Dept: FAMILY MEDICINE CLINIC | Facility: CLINIC | Age: 87
End: 2022-01-05
Payer: MEDICARE

## 2022-01-05 ENCOUNTER — TELEPHONE (OUTPATIENT)
Dept: FAMILY MEDICINE CLINIC | Facility: CLINIC | Age: 87
End: 2022-01-05

## 2022-01-05 VITALS
WEIGHT: 144 LBS | BODY MASS INDEX: 25.52 KG/M2 | OXYGEN SATURATION: 98 % | SYSTOLIC BLOOD PRESSURE: 110 MMHG | HEART RATE: 65 BPM | TEMPERATURE: 97.3 F | HEIGHT: 63 IN | RESPIRATION RATE: 16 BRPM | DIASTOLIC BLOOD PRESSURE: 70 MMHG

## 2022-01-05 DIAGNOSIS — R39.15 URGENCY OF URINATION: Primary | ICD-10-CM

## 2022-01-05 DIAGNOSIS — R30.0 DYSURIA: ICD-10-CM

## 2022-01-05 DIAGNOSIS — I71.2 THORACIC AORTIC ANEURYSM WITHOUT RUPTURE (HCC): ICD-10-CM

## 2022-01-05 DIAGNOSIS — N18.32 STAGE 3B CHRONIC KIDNEY DISEASE (HCC): ICD-10-CM

## 2022-01-05 LAB
SL AMB  POCT GLUCOSE, UA: NEGATIVE
SL AMB LEUKOCYTE ESTERASE,UA: ABNORMAL
SL AMB POCT BILIRUBIN,UA: NEGATIVE
SL AMB POCT BLOOD,UA: ABNORMAL
SL AMB POCT COLOR,UA: ABNORMAL
SL AMB POCT KETONES,UA: NEGATIVE
SL AMB POCT NITRITE,UA: NEGATIVE
SL AMB POCT PH,UA: 6
SL AMB POCT SPECIFIC GRAVITY,UA: 1.02
SL AMB POCT URINE PROTEIN: ABNORMAL
SL AMB POCT UROBILINOGEN: NEGATIVE

## 2022-01-05 PROCEDURE — 99213 OFFICE O/P EST LOW 20 MIN: CPT | Performed by: FAMILY MEDICINE

## 2022-01-05 PROCEDURE — 87086 URINE CULTURE/COLONY COUNT: CPT | Performed by: FAMILY MEDICINE

## 2022-01-05 PROCEDURE — 81002 URINALYSIS NONAUTO W/O SCOPE: CPT | Performed by: FAMILY MEDICINE

## 2022-01-05 RX ORDER — CIPROFLOXACIN 250 MG/1
250 TABLET, FILM COATED ORAL EVERY 12 HOURS SCHEDULED
Qty: 14 TABLET | Refills: 0 | Status: SHIPPED | OUTPATIENT
Start: 2022-01-05 | End: 2022-01-12

## 2022-01-05 NOTE — ASSESSMENT & PLAN NOTE
Lab Results   Component Value Date    CREATININE 1 20 (H) 11/17/2021    CREATININE 1 36 (H) 04/30/2021    CREATININE 1 47 (H) 11/10/2020   stable  Will do lower dose of cipro

## 2022-01-05 NOTE — PROGRESS NOTES
Assessment/Plan:    1  Urgency of urination  Assessment & Plan:  Check culture  Start cipro  Pt uncomfortable    Orders:  -     POCT urine dip  -     Urine culture    2  Thoracic aortic aneurysm without rupture ([address] )  Assessment & Plan:  stable      3  Stage 3b chronic kidney disease [facility])  Assessment & Plan:  Lab Results   Component Value Date    CREATININE 1 20 (H) 11/17/2021    CREATININE 1 36 (H) 04/30/2021    CREATININE 1 47 (H) 11/10/2020   stable  Will do lower dose of cipro      4  Dysuria  -     ciprofloxacin (CIPRO) 250 mg tablet; Take 1 tablet (250 mg total) by mouth every 12 (twelve) hours for 7 days    BMI Counseling: Body mass index is 25 51 kg/m²  The BMI is above normal  Nutrition recommendations include encouraging healthy choices of fruits and vegetables  Exercise recommendations include exercising 3-5 times per week  No pharmacotherapy was ordered  Rationale for BMI follow-up plan is due to patient being overweight or obese  There are no Patient Instructions on file for this visit  No follow-ups on file  Subjective:      Patient ID: Geri Chris is a 81 y o  female  Chief Complaint   Patient presents with    Urinary Tract Infection     Patient here for urinary urgency no burning no blood for 3 days       Started Monday with increased frequency  No burning  Increased water and cranberry     Urinary Frequency   This is a new problem  The current episode started in the past 7 days  The problem occurs every urination  The problem has been unchanged  There has been no fever  She is not sexually active  There is no history of pyelonephritis  Associated symptoms include frequency, hesitancy and urgency  Pertinent negatives include no hematuria  She has tried increased fluids for the symptoms  The treatment provided no relief         The following portions of the patient's history were reviewed and updated as appropriate:  past social history    Review of Systems   Genitourinary: Positive for frequency, hesitancy and urgency  Negative for hematuria  Current Outpatient Medications   Medication Sig Dispense Refill    atorvastatin (LIPITOR) 10 mg tablet Take 1 tablet (10 mg total) by mouth daily 90 tablet 3    metoprolol tartrate (LOPRESSOR) 50 mg tablet Take 1 tablet (50 mg total) by mouth every 12 (twelve) hours 180 tablet 3    triamterene-hydrochlorothiazide (MAXZIDE-25) 37 5-25 mg per tablet Take 1 tablet by mouth daily 90 tablet 3    ciprofloxacin (CIPRO) 250 mg tablet Take 1 tablet (250 mg total) by mouth every 12 (twelve) hours for 7 days 14 tablet 0     No current facility-administered medications for this visit  Objective:    /70 (BP Location: Left arm, Patient Position: Sitting, Cuff Size: Standard)   Pulse 65   Temp (!) 97 3 °F (36 3 °C)   Resp 16   Ht 5' 3" (1 6 m)   Wt 65 3 kg (144 lb)   SpO2 98%   BMI 25 51 kg/m²      Physical Exam  Vitals reviewed  Constitutional:       Appearance: Normal appearance  Eyes:      Extraocular Movements: Extraocular movements intact  Pupils: Pupils are equal, round, and reactive to light  Cardiovascular:      Rate and Rhythm: Normal rate and regular rhythm  Pulses: Normal pulses  Heart sounds: Normal heart sounds  Pulmonary:      Effort: Pulmonary effort is normal       Breath sounds: Normal breath sounds  Abdominal:      General: Abdomen is flat  Palpations: Abdomen is soft  Musculoskeletal:         General: Normal range of motion  Cervical back: Normal range of motion and neck supple  Skin:     General: Skin is warm  Capillary Refill: Capillary refill takes less than 2 seconds  Neurological:      General: No focal deficit present  Mental Status: She is alert and oriented to person, place, and time  Psychiatric:         Mood and Affect: Mood normal          Behavior: Behavior normal          Thought Content:  Thought content normal          Judgment: Judgment normal  Recent Results (from the past 24 hour(s))   POCT urine dip    Collection Time: 01/05/22 10:22 AM   Result Value Ref Range    LEUKOCYTE ESTERASE,UA 3+     NITRITE,UA Negative     SL AMB POCT UROBILINOGEN Negative     POCT URINE PROTEIN 2+      PH,UA 6 0     BLOOD,UA 3+     SPECIFIC GRAVITY,UA 1 020     KETONES,UA Negative     BILIRUBIN,UA Negative     GLUCOSE, UA Negative      COLOR,UA Yellow cloudy      Himie Erp, DO

## 2022-01-05 NOTE — TELEPHONE ENCOUNTER
Patient came into the office and wanted to know if a Urine test can be ordered     Patient called Monday and stated that she has been having burning with urination and urinary frequency she stated that she has been waiting for someone to call her back , I explained we left a message with your  for her to call us back,     Lab: quest

## 2022-01-06 ENCOUNTER — OFFICE VISIT (OUTPATIENT)
Dept: CARDIOLOGY CLINIC | Facility: CLINIC | Age: 87
End: 2022-01-06
Payer: MEDICARE

## 2022-01-06 VITALS
BODY MASS INDEX: 24.59 KG/M2 | HEIGHT: 64 IN | DIASTOLIC BLOOD PRESSURE: 56 MMHG | HEART RATE: 82 BPM | WEIGHT: 144 LBS | OXYGEN SATURATION: 94 % | SYSTOLIC BLOOD PRESSURE: 116 MMHG

## 2022-01-06 DIAGNOSIS — I25.10 CORONARY ARTERY DISEASE INVOLVING NATIVE CORONARY ARTERY OF NATIVE HEART WITHOUT ANGINA PECTORIS: ICD-10-CM

## 2022-01-06 DIAGNOSIS — I65.22 CAROTID ARTERY OBSTRUCTION, LEFT: Primary | ICD-10-CM

## 2022-01-06 DIAGNOSIS — E78.2 MIXED HYPERLIPIDEMIA: ICD-10-CM

## 2022-01-06 DIAGNOSIS — I71.2 THORACIC AORTIC ANEURYSM WITHOUT RUPTURE (HCC): ICD-10-CM

## 2022-01-06 DIAGNOSIS — I10 PRIMARY HYPERTENSION: ICD-10-CM

## 2022-01-06 LAB — BACTERIA UR CULT: NORMAL

## 2022-01-06 PROCEDURE — 99214 OFFICE O/P EST MOD 30 MIN: CPT | Performed by: INTERNAL MEDICINE

## 2022-01-06 NOTE — PROGRESS NOTES
Cardiology Follow Up    Tao Francis  1935  [id number]  [facility] [address]  [phone]    1  Carotid artery obstruction, left  Ambulatory referral to Vascular Surgery   2  Coronary artery disease involving native coronary artery of native heart without angina pectoris     3  Thoracic aortic aneurysm without rupture ([facility] )     4  Primary hypertension     5  Mixed hyperlipidemia         Interval History:  No complaints  Denies chest pain shortness of breath orthopnea paroxysmal nocturnal dyspnea syncope or focal neurological deficit      Patient Active Problem List   Diagnosis    Coronary artery disease involving native heart without angina pectoris    Thoracic aortic aneurysm without rupture ([facility] )    Hypertension    Carotid artery obstruction, left    Aneurysm of ascending aorta (HCC)    Arteriosclerosis of carotid artery    Carotid atherosclerosis    Chronic kidney disease, stage 3 ([facility] )    GERD without esophagitis    Hyperlipidemia    Impaired fasting glucose    Polyp of sigmoid colon    Medicare annual wellness visit, subsequent    Chronic left-sided low back pain without sciatica    Urgency of urination     Past Medical History:   Diagnosis Date    Aorta aneurysm ([facility] )     Cardiac disease     Fall 5/23/2019    GERD (gastroesophageal reflux disease)     Hematuria     last assessed: 10/28/2013    Hip pain, acute, left 5/23/2019    Shortness of breath     last assessed: 6/27/2013     Social History     Socioeconomic History    Marital status: /Civil Union     Spouse name: Not on file    Number of children: Not on file    Years of education: Not on file    Highest education level: Not on file   Occupational History    Not on file   Tobacco Use    Smoking status: Former Smoker    Smokeless tobacco: Never Used   Substance and Sexual Activity    Alcohol use: No    Drug use: No    Sexual activity: Never   Other Topics Concern    Not on file   Social History Narrative    Daily coffee consumption (1 cups/day)     Social Determinants of Health     Financial Resource Strain: Not on file   Food Insecurity: Not on file   Transportation Needs: Not on file   Physical Activity: Not on file   Stress: Not on file   Social Connections: Not on file   Intimate Partner Violence: Not on file   Housing Stability: Not on file      Family History   Problem Relation Age of Onset    Diabetes Mother         mellitus    Glaucoma Mother     Hypertension Mother     Macular degeneration Mother     Stroke Father         syndrome    Hypertension Father     Cancer Sister     Dementia Sister     Heart disease Sister     No Known Problems Daughter     No Known Problems Daughter     No Known Problems Sister     No Known Problems Sister     No Known Problems Sister     No Known Problems Sister     Breast cancer Sister 36    No Known Problems Maternal Grandmother     No Known Problems Maternal Grandfather     No Known Problems Paternal Grandmother     No Known Problems Paternal Grandfather     No Known Problems Sister     Cancer Maternal Aunt     Cancer Maternal Aunt     Cancer Maternal Aunt     No Known Problems Maternal Aunt     No Known Problems Maternal Aunt     No Known Problems Maternal Aunt     No Known Problems Paternal Aunt     No Known Problems Paternal Aunt     No Known Problems Paternal Aunt     No Known Problems Paternal Aunt     No Known Problems Paternal Aunt      Past Surgical History:   Procedure Laterality Date    ADENOIDECTOMY      APPENDECTOMY      BREAST BIOPSY Left     BREAST LUMPECTOMY Left     CATARACT EXTRACTION      CERVICAL FUSION      HYSTERECTOMY      age 49   [place] OOPHORECTOMY      OTHER SURGICAL HISTORY      paravaginal defect graft-reinforced repair    REPAIR RECTOCELE      TONSILLECTOMY      VEIN LIGATION AND STRIPPING Bilateral        Current Outpatient Medications:     atorvastatin (LIPITOR) 10 mg tablet, Take 1 tablet (10 mg total) by mouth daily, Disp: 90 tablet, Rfl: 3    ciprofloxacin (CIPRO) 250 mg tablet, Take 1 tablet (250 mg total) by mouth every 12 (twelve) hours for 7 days, Disp: 14 tablet, Rfl: 0    metoprolol tartrate (LOPRESSOR) 50 mg tablet, Take 1 tablet (50 mg total) by mouth every 12 (twelve) hours, Disp: 180 tablet, Rfl: 3    triamterene-hydrochlorothiazide (MAXZIDE-25) 37 5-25 mg per tablet, Take 1 tablet by mouth daily, Disp: 90 tablet, Rfl: 3  No Known Allergies    Labs:  Office Visit on 01/05/2022   Component Date Value    LEUKOCYTE ESTERASE,UA 01/05/2022 3+     NITRITE,UA 01/05/2022 Negative     SL AMB POCT UROBILINOGEN 01/05/2022 Negative     POCT URINE PROTEIN 01/05/2022 2+      PH,UA 01/05/2022 6 0     BLOOD,UA 01/05/2022 3+     SPECIFIC GRAVITY,UA 01/05/2022 1 020     KETONES,UA 01/05/2022 Negative     BILIRUBIN,UA 01/05/2022 Negative     GLUCOSE, UA 01/05/2022 Negative      COLOR,UA 01/05/2022 Yellow cloudy      Imaging: Mammo screening bilateral w 3d & cad    Result Date: 1/5/2022  Narrative: DIAGNOSIS: Encounter for screening mammogram for malignant neoplasm of breast TECHNIQUE: Digital screening mammography was performed  Computer Aided Detection (CAD) analyzed all applicable images  COMPARISONS: Prior breast imaging dated: 09/01/2020, 06/17/2019, 06/13/2018, 06/12/2017, 06/12/2017, and 06/10/2016 RELEVANT HISTORY: Family Breast Cancer History: History of breast cancer in Sister  Family Medical History: Family medical history includes breast cancer in sister  Personal History: No known relevant hormone history  Surgical history includes breast biopsy, lumpectomy, hysterectomy, and oophorectomy  No known relevant medical history  The patient is scheduled in a reminder system for screening mammography  8-10% of cancers will be missed on mammography   Management of a palpable abnormality must be based on clinical grounds  Patients will be notified of their results via letter from our facility  Accredited by [employer] of [employer] and FDA  RISK ASSESSMENT: 5 Year Tyrer-Cuzick: No Score 10 Year Tyrer-Cuzick: No Score Lifetime Tyrer-Cuzick: 0 18 % TISSUE DENSITY: There are scattered areas of fibroglandular density  INDICATION: Yazmin Leiva is a 81 y o  female presenting for screening mammography  FINDINGS: Bilateral There are no suspicious masses, grouped microcalcifications or areas of unexplained architectural distortion  Scattered benign-appearing calcifications are seen in both breasts  The skin and nipple areolar complex are unremarkable  Impression: No mammographic evidence of malignancy  No significant change ASSESSMENT/BI-RADS CATEGORY: Left: 2 - Benign Right: 2 - Benign Overall: 2 - Benign RECOMMENDATION:      - Routine screening mammogram in 1 year for both breasts  Workstation ID: [id number]       Review of Systems:  Review of Systems   Constitutional: Negative for fatigue  HENT: Negative for hearing loss  Eyes: Negative for discharge  Respiratory: Negative for shortness of breath  Cardiovascular: Negative for chest pain, palpitations and leg swelling  Gastrointestinal: Negative for abdominal pain  Endocrine: Negative for polyuria  Genitourinary: Negative for dysuria  Musculoskeletal: Negative for back pain and myalgias  Skin: Negative for rash  Neurological: Negative for syncope  Hematological: Does not bruise/bleed easily  Psychiatric/Behavioral: Negative for confusion and sleep disturbance  Physical Exam:  Physical Exam  Vitals and nursing note reviewed  Constitutional:       Appearance: She is well-developed  HENT:      Head: Normocephalic and atraumatic  Neck:      Vascular: No JVD  Cardiovascular:      Rate and Rhythm: Normal rate and regular rhythm        Pulses:           Carotid pulses are on the right side with bruit and on the left side with bruit  Heart sounds: Normal heart sounds  Pulmonary:      Effort: Pulmonary effort is normal       Breath sounds: Normal breath sounds  Abdominal:      General: Bowel sounds are normal       Palpations: Abdomen is soft  Musculoskeletal:         General: Normal range of motion  Cervical back: Normal range of motion and neck supple  Skin:     General: Skin is warm and dry  Neurological:      Mental Status: She is alert and oriented to person, place, and time  Psychiatric:         Behavior: Behavior normal          Thought Content: Thought content normal          Judgment: Judgment normal          Discussion/Summary:  Asymptomatic in functional class 1  Remote history of coronary intervention  Known ascending aortic aneurysm no longer being monitored secondary to her not being a surgical candidate  She does pursue aggressive risk factor modification including good blood pressure control at present  She is on moderate dose statin  She continues on aspirin  She does have a greater than 70% with right obstruction  I have asked her to get an opinion from vascular surgeon whether she should continue on medical therapy or consider carotid surgery  I will see her again in 6 months

## 2022-02-05 DIAGNOSIS — I10 ESSENTIAL HYPERTENSION: ICD-10-CM

## 2022-02-05 RX ORDER — TRIAMTERENE AND HYDROCHLOROTHIAZIDE 37.5; 25 MG/1; MG/1
1 TABLET ORAL DAILY
Qty: 90 TABLET | Refills: 3 | Status: SHIPPED | OUTPATIENT
Start: 2022-02-05

## 2022-02-05 RX ORDER — METOPROLOL TARTRATE 50 MG/1
TABLET, FILM COATED ORAL
Qty: 180 TABLET | Refills: 3 | Status: SHIPPED | OUTPATIENT
Start: 2022-02-05

## 2022-04-26 LAB
ALBUMIN SERPL-MCNC: 4.3 G/DL (ref 3.6–5.1)
ALBUMIN/GLOB SERPL: 1.4 (CALC) (ref 1–2.5)
ALP SERPL-CCNC: 91 U/L (ref 37–153)
ALT SERPL-CCNC: 15 U/L (ref 6–29)
AST SERPL-CCNC: 22 U/L (ref 10–35)
BILIRUB SERPL-MCNC: 0.8 MG/DL (ref 0.2–1.2)
BUN SERPL-MCNC: 31 MG/DL (ref 7–25)
BUN/CREAT SERPL: 23 (CALC) (ref 6–22)
CALCIUM SERPL-MCNC: 10.1 MG/DL (ref 8.6–10.4)
CHLORIDE SERPL-SCNC: 98 MMOL/L (ref 98–110)
CHOLEST SERPL-MCNC: 145 MG/DL
CHOLEST/HDLC SERPL: 2.6 (CALC)
CO2 SERPL-SCNC: 34 MMOL/L (ref 20–32)
CREAT SERPL-MCNC: 1.33 MG/DL (ref 0.6–0.88)
ERYTHROCYTE [DISTWIDTH] IN BLOOD BY AUTOMATED COUNT: 12.7 % (ref 11–15)
EST. AVERAGE GLUCOSE BLD GHB EST-MCNC: 140 MG/DL
EST. AVERAGE GLUCOSE BLD GHB EST-SCNC: 7.7 MMOL/L
GLOBULIN SER CALC-MCNC: 3 G/DL (CALC) (ref 1.9–3.7)
GLUCOSE SERPL-MCNC: 134 MG/DL (ref 65–99)
HBA1C MFR BLD: 6.5 % OF TOTAL HGB
HCT VFR BLD AUTO: 41.5 % (ref 35–45)
HDLC SERPL-MCNC: 55 MG/DL
HGB BLD-MCNC: 14.4 G/DL (ref 11.7–15.5)
LDLC SERPL CALC-MCNC: 70 MG/DL (CALC)
MCH RBC QN AUTO: 32.7 PG (ref 27–33)
MCHC RBC AUTO-ENTMCNC: 34.7 G/DL (ref 32–36)
MCV RBC AUTO: 94.3 FL (ref 80–100)
NONHDLC SERPL-MCNC: 90 MG/DL (CALC)
PLATELET # BLD AUTO: 178 THOUSAND/UL (ref 140–400)
PMV BLD REES-ECKER: 10.2 FL (ref 7.5–12.5)
POTASSIUM SERPL-SCNC: 3.6 MMOL/L (ref 3.5–5.3)
PROT SERPL-MCNC: 7.3 G/DL (ref 6.1–8.1)
RBC # BLD AUTO: 4.4 MILLION/UL (ref 3.8–5.1)
SL AMB EGFR AFRICAN AMERICAN: 42 ML/MIN/1.73M2
SL AMB EGFR NON AFRICAN AMERICAN: 36 ML/MIN/1.73M2
SODIUM SERPL-SCNC: 139 MMOL/L (ref 135–146)
TRIGL SERPL-MCNC: 123 MG/DL
TSH SERPL-ACNC: 2.62 MIU/L (ref 0.4–4.5)
WBC # BLD AUTO: 5.4 THOUSAND/UL (ref 3.8–10.8)

## 2022-06-05 DIAGNOSIS — I25.10 CORONARY ARTERY DISEASE INVOLVING NATIVE HEART WITHOUT ANGINA PECTORIS, UNSPECIFIED VESSEL OR LESION TYPE: ICD-10-CM

## 2022-06-05 DIAGNOSIS — E78.2 MIXED HYPERLIPIDEMIA: ICD-10-CM

## 2022-06-06 RX ORDER — ATORVASTATIN CALCIUM 10 MG/1
TABLET, FILM COATED ORAL
Qty: 90 TABLET | Refills: 0 | Status: SHIPPED | OUTPATIENT
Start: 2022-06-06

## 2022-06-13 ENCOUNTER — OFFICE VISIT (OUTPATIENT)
Dept: FAMILY MEDICINE CLINIC | Facility: CLINIC | Age: 87
End: 2022-06-13
Payer: MEDICARE

## 2022-06-13 VITALS
WEIGHT: 137.6 LBS | HEART RATE: 83 BPM | HEIGHT: 64 IN | SYSTOLIC BLOOD PRESSURE: 98 MMHG | DIASTOLIC BLOOD PRESSURE: 90 MMHG | TEMPERATURE: 97.6 F | OXYGEN SATURATION: 94 % | RESPIRATION RATE: 16 BRPM | BODY MASS INDEX: 23.49 KG/M2

## 2022-06-13 DIAGNOSIS — J06.9 VIRAL UPPER RESPIRATORY TRACT INFECTION: Primary | ICD-10-CM

## 2022-06-13 PROCEDURE — 99213 OFFICE O/P EST LOW 20 MIN: CPT | Performed by: NURSE PRACTITIONER

## 2022-06-13 NOTE — PROGRESS NOTES
Assessment/Plan:    Viral upper respiratory tract infection  Day 5 of symptoms, starting to improve  Afebrile, unremarkable exam, lungs clear throughout  At this point, likely illness is viral and abx not indicated  She can use mucinex as needed to help bring up sputum  Call if sx worsen or persist   Reinforced hydration, nutrition  Diagnoses and all orders for this visit:    Viral upper respiratory tract infection        Subjective:      Patient ID: Victoriano Ji is a 81 y o  female  Pt is an 82 yo female here today for evaluation of cough  PMH includes GERD, ascending aortic aneurysm, carotid artery stenosis, CAD, toracic aortic aneurysm, CKD III, chronic low back pain, HLD  Sx started 6/9/22 with cough, sneezing, sore throat  Cough is productive of sputum  Denies fever, chills, body ache, headache, sob, nasal congestion  Appetite is decreased, denies n/v/d  She is not taking anything for her symptoms  She took a covid test on 6/10, 6/11 and both were negative  The following portions of the patient's history were reviewed and updated as appropriate: allergies, current medications, past family history, past medical history, past social history, past surgical history and problem list     Review of Systems   Constitutional: Positive for appetite change and fatigue  Negative for chills and fever  HENT: Positive for sore throat  Negative for congestion, ear pain, postnasal drip and sinus pressure  Respiratory: Positive for cough  Negative for chest tightness, shortness of breath and wheezing  Cardiovascular: Negative for chest pain, palpitations and leg swelling  Gastrointestinal: Negative for abdominal pain, diarrhea, nausea and vomiting  Genitourinary: Negative for dysuria  Musculoskeletal: Negative for myalgias  Neurological: Negative for dizziness, light-headedness and headaches  Hematological: Negative for adenopathy     Psychiatric/Behavioral: Negative for sleep disturbance  Objective:      BP 98/90 (BP Location: Left arm, Patient Position: Sitting)   Pulse 83   Temp 97 6 °F (36 4 °C)   Resp 16   Ht 5' 4" (1 626 m)   Wt 62 4 kg (137 lb 9 6 oz)   SpO2 94%   BMI 23 62 kg/m²          Physical Exam  Vitals reviewed  Constitutional:       General: She is awake  She is not in acute distress  Appearance: Normal appearance  She is well-developed and well-groomed  She is not ill-appearing  HENT:      Head: Normocephalic  Right Ear: Hearing, tympanic membrane, ear canal and external ear normal  No middle ear effusion  Tympanic membrane is not bulging  Left Ear: Hearing, tympanic membrane, ear canal and external ear normal   No middle ear effusion  Tympanic membrane is not bulging  Nose: No mucosal edema or rhinorrhea  Mouth/Throat:      Lips: Pink  Mouth: Mucous membranes are moist  Mucous membranes are not dry  Pharynx: No oropharyngeal exudate or posterior oropharyngeal erythema  Tonsils: No tonsillar abscesses  Eyes:      General: Lids are normal       Conjunctiva/sclera: Conjunctivae normal    Cardiovascular:      Rate and Rhythm: Normal rate and regular rhythm  Pulses: Normal pulses  Heart sounds: Normal heart sounds  No murmur heard  Pulmonary:      Effort: Pulmonary effort is normal  No accessory muscle usage or respiratory distress  Breath sounds: Normal breath sounds  No decreased breath sounds, wheezing, rhonchi or rales  Lymphadenopathy:      Head:      Right side of head: No submental, submandibular, tonsillar, preauricular, posterior auricular or occipital adenopathy  Left side of head: No submental, submandibular, tonsillar, preauricular, posterior auricular or occipital adenopathy  Cervical: No cervical adenopathy  Skin:     General: Skin is warm and dry  Neurological:      Mental Status: She is alert and oriented to person, place, and time     Psychiatric:         Attention and Perception: Attention normal          Mood and Affect: Mood normal          Speech: Speech normal          Behavior: Behavior normal  Behavior is cooperative  Thought Content:  Thought content normal          Cognition and Memory: Cognition normal          Judgment: Judgment normal

## 2022-06-13 NOTE — ASSESSMENT & PLAN NOTE
Day 5 of symptoms, starting to improve  Afebrile, unremarkable exam, lungs clear throughout  At this point, likely illness is viral and abx not indicated  She can use mucinex as needed to help bring up sputum  Call if sx worsen or persist   Reinforced hydration, nutrition

## 2022-09-03 DIAGNOSIS — E78.2 MIXED HYPERLIPIDEMIA: ICD-10-CM

## 2022-09-03 DIAGNOSIS — I25.10 CORONARY ARTERY DISEASE INVOLVING NATIVE HEART WITHOUT ANGINA PECTORIS, UNSPECIFIED VESSEL OR LESION TYPE: ICD-10-CM

## 2022-09-07 RX ORDER — ATORVASTATIN CALCIUM 10 MG/1
TABLET, FILM COATED ORAL
Qty: 90 TABLET | Refills: 0 | Status: SHIPPED | OUTPATIENT
Start: 2022-09-07

## 2022-09-13 ENCOUNTER — OFFICE VISIT (OUTPATIENT)
Dept: FAMILY MEDICINE CLINIC | Facility: CLINIC | Age: 87
End: 2022-09-13
Payer: MEDICARE

## 2022-09-13 VITALS
BODY MASS INDEX: 23.56 KG/M2 | WEIGHT: 138 LBS | SYSTOLIC BLOOD PRESSURE: 100 MMHG | OXYGEN SATURATION: 96 % | HEART RATE: 60 BPM | RESPIRATION RATE: 16 BRPM | HEIGHT: 64 IN | DIASTOLIC BLOOD PRESSURE: 60 MMHG | TEMPERATURE: 97.9 F

## 2022-09-13 DIAGNOSIS — M54.50 CHRONIC LEFT-SIDED LOW BACK PAIN WITHOUT SCIATICA: Primary | ICD-10-CM

## 2022-09-13 DIAGNOSIS — G89.29 CHRONIC LEFT-SIDED LOW BACK PAIN WITHOUT SCIATICA: Primary | ICD-10-CM

## 2022-09-13 PROBLEM — J06.9 VIRAL UPPER RESPIRATORY TRACT INFECTION: Status: RESOLVED | Noted: 2022-06-13 | Resolved: 2022-09-13

## 2022-09-13 PROCEDURE — 99213 OFFICE O/P EST LOW 20 MIN: CPT | Performed by: FAMILY MEDICINE

## 2022-09-13 NOTE — PROGRESS NOTES
Assessment/Plan:    1  Chronic left-sided low back pain without sciatica  Assessment & Plan:  Similar to 2019  Did pt and improved symptoms    Orders:  -     Ambulatory Referral to Physical Therapy; Future        Depression Screening and Follow-up Plan: Patient was screened for depression during today's encounter  They screened negative with a PHQ-2 score of 0  There are no Patient Instructions on file for this visit  Return if symptoms worsen or fail to improve  Subjective:      Patient ID: Dennis Palomares is a 81 y o  female  Chief Complaint   Patient presents with    Back Pain     Patient here with low back pain into left buttock down to leg        Here for follow up  Started Monday with left buttock pain and into left leg pain  Few days prior was working for [employer]    Back [employer]  This is a recurrent problem  The current episode started in the past 7 days  The problem occurs intermittently  The problem has been gradually improving since onset  The pain is present in the lumbar spine and gluteal  The quality of the pain is described as burning  The symptoms are aggravated by sitting  She has tried analgesics for the symptoms  The treatment provided mild relief  The following portions of the patient's history were reviewed and updated as appropriate: allergies, current medications, past family history, past medical history, past social history, past surgical history and problem list     Review of Systems   Constitutional: Negative  HENT: Negative  Eyes: Negative  Respiratory: Negative  Cardiovascular: Negative  Musculoskeletal: Positive for back pain  Allergic/Immunologic: Negative  Neurological: Negative  Hematological: Negative  Psychiatric/Behavioral: Negative            Current Outpatient Medications   Medication Sig Dispense Refill    atorvastatin (LIPITOR) 10 mg tablet TAKE 1 TABLET(10 MG) BY MOUTH DAILY 90 tablet 0    metoprolol tartrate (LOPRESSOR) 50 mg tablet TAKE 1 TABLET(50 MG) BY MOUTH EVERY 12 HOURS 180 tablet 3    triamterene-hydrochlorothiazide (MAXZIDE-25) 37 5-25 mg per tablet TAKE 1 TABLET BY MOUTH DAILY 90 tablet 3     No current facility-administered medications for this visit  Objective:    /60 (BP Location: Left arm, Patient Position: Sitting, Cuff Size: Standard)   Pulse 60   Temp 97 9 °F (36 6 °C) (Tympanic)   Resp 16   Ht 5' 4" (1 626 m)   Wt 62 6 kg (138 lb)   SpO2 96%   BMI 23 69 kg/m²        Physical Exam  Vitals and nursing note reviewed  Constitutional:       Appearance: Normal appearance  Musculoskeletal:         General: Tenderness (left sciatica buttock pain) present  Neurological:      General: No focal deficit present  Mental Status: She is alert and oriented to person, place, and time  Psychiatric:         Mood and Affect: Mood normal          Behavior: Behavior normal          Thought Content:  Thought content normal          Judgment: Judgment normal                 Cholo Vaca DO

## 2022-09-22 ENCOUNTER — EVALUATION (OUTPATIENT)
Dept: PHYSICAL THERAPY | Facility: CLINIC | Age: 87
End: 2022-09-22
Payer: MEDICARE

## 2022-09-22 DIAGNOSIS — M54.50 CHRONIC LEFT-SIDED LOW BACK PAIN WITHOUT SCIATICA: ICD-10-CM

## 2022-09-22 DIAGNOSIS — G89.29 CHRONIC LEFT-SIDED LOW BACK PAIN WITHOUT SCIATICA: ICD-10-CM

## 2022-09-22 PROCEDURE — 97161 PT EVAL LOW COMPLEX 20 MIN: CPT | Performed by: PHYSICAL THERAPIST

## 2022-09-22 PROCEDURE — 97140 MANUAL THERAPY 1/> REGIONS: CPT | Performed by: PHYSICAL THERAPIST

## 2022-09-22 NOTE — PROGRESS NOTES
PT Evaluation     Today's date: 2022  Patient name: Parth Edwards  : 1935  MRN: [id number]  Referring provider: Junior Duffy DO  Dx:   Encounter Diagnosis     ICD-10-CM    1  Chronic left-sided low back pain without sciatica  M54 50 Ambulatory Referral to Physical Therapy    G89 29                   Assessment  Assessment details: Pt is a 81 y o  female presenting to PT with acute L LBP with radiculopathy not associated with injury  PT findings include: gross hypomobility of lumbar spine, strength deficits in L proximal LE musculature as well as significant soft tissue tension of L lumbar paraspinals  Pt educated on natural course of improvement in lumbar spine generally with movement per tolerance as well as importance of posture  Pt educated further on anatomy, biomechanics, POC and rehab course  Pt will benefit from skilled PT to address above impairments to return to PLOF with less restriction and to reach functional goals  Impairments: abnormal or restricted ROM, impaired physical strength, lacks appropriate home exercise program and pain with function    Symptom irritability: lowUnderstanding of Dx/Px/POC: good   Prognosis: good    Goals  1  Pt will demonstrate understanding of HEP and POC in order to improve compliance with course of rehab in 2 weeks  2  Pt will demonstrate awareness of appropriate posture with seated, standing and functional dynamic reaching activities in order to decrease excessive loads/pain with ADL's in 3 weeks  3  Pt will experience 0/10 pain with activity in order for pt to return to PLOF by d/c    4  Pt will demonstrate decreased lumbar paraspinal tone to allow pt to perform transfers with less restriction by d/c       Plan  Patient would benefit from: skilled physical therapy  Planned modality interventions: low level laser therapy  Planned therapy interventions: manual therapy, joint mobilization, patient education, postural training, therapeutic activities, therapeutic exercise, stretching, strengthening, home exercise program, functional ROM exercises and abdominal trunk stabilization  Frequency: 2x week  Duration in weeks: 4        Subjective Evaluation    History of Present Illness  Mechanism of injury: Pt arrives with chief complaint of L low back pain that had radiated into the L lower leg  She states that there was no injury or trauma associated with her pain  She states that at the time, she had significant difficulty with walking and moving overall secondary to her pain  She states that at this time, improving  She did not take any medication but did try her best to walk  She was referred by her PCP  Quality of life: good    Pain  Current pain ratin  At best pain ratin  At worst pain ratin  Location: L LBP  Quality: radiating, dull ache and throbbing    Patient Goals  Patient goals for therapy: decreased pain, increased motion, increased strength and independence with ADLs/IADLs          Objective    Flowsheet Rows    Flowsheet Row Most Recent Value   PT/OT G-Codes    Current Score 36   Projected Score 63        ROM:  Lumbar flex: limited 25%  Lumbar ext: limited 25%  SB: limited 25% L SB (recreation of pain)    Joint mobility:  CPA: hypo L1-S1  UPA: hypo L1/L2-L5/S1 L    Strength (R; L): Hip flex: 5/5; 4/5  Hip abduction: 5/5; 4/5  Hip ER: 5/5; 4/5    Palpation:   +TTP lumbar paraspinals with significant L lumbar paraspinal soft tissue tension  Transfer:   Bed mobility: WNL  Sit to Stand: able to perform without difficulty  Precautions:  Aortic Aneurysm per chart       Manuals             Lumbar paraspinal STM/TrP STM DL L focus 10'            Laser L lumbar paraspinal            CPA lumbar Gr III                         Neuro Re-Ed                                                                                                        Ther Ex             Bridge HEP            Hip abduction HEP            Clamshell HEP LTR HEP            Hip adduction sq                Glute stretch             Supine march                          Ther Activity             Bike                          Gait Training                                       Modalities

## 2022-09-27 ENCOUNTER — OFFICE VISIT (OUTPATIENT)
Dept: PHYSICAL THERAPY | Facility: CLINIC | Age: 87
End: 2022-09-27
Payer: MEDICARE

## 2022-09-27 DIAGNOSIS — M54.50 CHRONIC LEFT-SIDED LOW BACK PAIN WITHOUT SCIATICA: Primary | ICD-10-CM

## 2022-09-27 DIAGNOSIS — G89.29 CHRONIC LEFT-SIDED LOW BACK PAIN WITHOUT SCIATICA: Primary | ICD-10-CM

## 2022-09-27 PROCEDURE — 97110 THERAPEUTIC EXERCISES: CPT

## 2022-09-27 PROCEDURE — 97140 MANUAL THERAPY 1/> REGIONS: CPT

## 2022-09-27 NOTE — PROGRESS NOTES
Daily Note     Today's date: 2022  Patient name: Brittany Saucedo  : 1935  MRN: [id number]  Referring provider: Lance Gardiner DO  Dx:   Encounter Diagnosis     ICD-10-CM    1  Chronic left-sided low back pain without sciatica  M54 50     G89 29                   Subjective: Pt reports her L LB area is feeling better  Objective: See treatment diary below    Assessment: Reviewed/performed HEP  Required verbal and tactile cues for clamshells and s/l hip ABD  New exercises performed w/o complaint  Responded well to manual therapies  Issued updated written HEP instructions, reviewed same w/pt  Will monitor Progress as tolerated  Plan: Cont per POC  Precautions:  Aortic Aneurysm per chart       Manuals            Lumbar paraspinal STM/TrP STM DL L focus 10' STM focus L 10'           Laser L lumbar paraspinal L lumbar para           CPA lumbar Gr III Gr III - KL                        Neuro Re-Ed                                                                                                        Ther Ex             Bridge HEP 2x10           Hip abduction HEP 2x10           Clamshell HEP 2x10           LTR HEP 2x10           Hip adduction sq    5"x10           Glute stretch  3x20"           Supine march  10x                        Ther Activity             Bike  5'                        Gait Training                                       Modalities

## 2022-09-29 ENCOUNTER — OFFICE VISIT (OUTPATIENT)
Dept: PHYSICAL THERAPY | Facility: CLINIC | Age: 87
End: 2022-09-29
Payer: MEDICARE

## 2022-09-29 DIAGNOSIS — M54.50 CHRONIC LEFT-SIDED LOW BACK PAIN WITHOUT SCIATICA: Primary | ICD-10-CM

## 2022-09-29 DIAGNOSIS — G89.29 CHRONIC LEFT-SIDED LOW BACK PAIN WITHOUT SCIATICA: Primary | ICD-10-CM

## 2022-09-29 PROCEDURE — 97110 THERAPEUTIC EXERCISES: CPT

## 2022-09-29 PROCEDURE — 97140 MANUAL THERAPY 1/> REGIONS: CPT

## 2022-09-29 NOTE — PROGRESS NOTES
Daily Note     Today's date: 2022  Patient name: Eiatn Troy  : 1935  MRN: [id number]  Referring provider: Jose Luis Bernardo DO  Dx:   Encounter Diagnosis     ICD-10-CM    1  Chronic left-sided low back pain without sciatica  M54 50     G89 29                   Subjective: Pt reports that her back is feeling good  She states back feels "much better than last week"  "I felt so good after last visit "    Objective: See treatment diary below    Assessment: Performed exercise progressions w/o issue  Cont to require verbal and tactile cues for s/l clamshells and hip  ABD  Good relief after STM and laser  Will monitor  Plan: Continue per plan of care  Precautions:  Aortic Aneurysm per chart       Manuals           Lumbar paraspinal STM/TrP STM DL L focus 10' STM focus L 10' STM focus L 10'          Laser L lumbar paraspinal L lumbar para L lumbar paravertebrals          CPA lumbar Gr III Gr III - KL nv                       Neuro Re-Ed                                                                                                        Ther Ex             Bridge HEP 2x10 2x10          Hip abduction HEP 2x10 2x10          Clamshell HEP 2x10 2x10          LTR HEP 2x10 2x10          Hip adduction sq    5"x10 5"x20          Glute stretch  3x20" 3x20"          Supine march  10x 2x10                       Ther Activity             Bike  5' 8'                       Gait Training                                       Modalities

## 2022-10-04 ENCOUNTER — OFFICE VISIT (OUTPATIENT)
Dept: PHYSICAL THERAPY | Facility: CLINIC | Age: 87
End: 2022-10-04
Payer: MEDICARE

## 2022-10-04 DIAGNOSIS — G89.29 CHRONIC LEFT-SIDED LOW BACK PAIN WITHOUT SCIATICA: Primary | ICD-10-CM

## 2022-10-04 DIAGNOSIS — M54.50 CHRONIC LEFT-SIDED LOW BACK PAIN WITHOUT SCIATICA: Primary | ICD-10-CM

## 2022-10-04 PROCEDURE — 97140 MANUAL THERAPY 1/> REGIONS: CPT

## 2022-10-04 PROCEDURE — 97110 THERAPEUTIC EXERCISES: CPT

## 2022-10-04 PROCEDURE — 97530 THERAPEUTIC ACTIVITIES: CPT

## 2022-10-04 NOTE — PROGRESS NOTES
Daily Note     Today's date: 10/4/2022  Patient name: Magnolia Wilcox  : 1935  MRN: [id number]  Referring provider: Jennie Rutledge DO  Dx:   Encounter Diagnosis     ICD-10-CM    1  Chronic left-sided low back pain without sciatica  M54 50     G89 29                   Subjective: Pt reports she is feeling good, even w/rainy weather  Objective: See treatment diary below    Assessment: Performed exercise program w/o discomfort  Cont to require verbal and tactile cueing for s/l hip ABD and clamshells  Manual therapies well bonnie  Will monitor  Plan: Cont per POC  Precautions: Aortic Aneurysm per chart       Manuals 9/22 9/27 9/29 10/4         Lumbar paraspinal STM/TrP STM DL L focus 10' STM focus L 10' STM focus L 10' STM focus L- MO         Laser L lumbar paraspinal L lumbar para L lumbar paravertebrals L lum   para         CPA lumbar Gr III Gr III - KL nv Gr III  DL                      Neuro Re-Ed                                                                                                        Ther Ex             Bridge HEP 2x10 2x10 2x10         Hip abduction HEP 2x10 2x10 2x10         Clamshell HEP 2x10 2x10 2x10         LTR HEP 2x10 2x10 2x10         Hip adduction sq    5"x10 5"x20 5"x20         Glute stretch  3x20" 3x20" 3x20"         Supine march  10x 2x10 2x10                      Ther Activity             Bike  5' 8' 8'                      Gait Training                                       Modalities

## 2022-10-06 ENCOUNTER — OFFICE VISIT (OUTPATIENT)
Dept: PHYSICAL THERAPY | Facility: CLINIC | Age: 87
End: 2022-10-06
Payer: MEDICARE

## 2022-10-06 DIAGNOSIS — M54.50 CHRONIC LEFT-SIDED LOW BACK PAIN WITHOUT SCIATICA: Primary | ICD-10-CM

## 2022-10-06 DIAGNOSIS — G89.29 CHRONIC LEFT-SIDED LOW BACK PAIN WITHOUT SCIATICA: Primary | ICD-10-CM

## 2022-10-06 PROCEDURE — 97530 THERAPEUTIC ACTIVITIES: CPT

## 2022-10-06 PROCEDURE — 97110 THERAPEUTIC EXERCISES: CPT

## 2022-10-06 PROCEDURE — 97140 MANUAL THERAPY 1/> REGIONS: CPT

## 2022-10-06 NOTE — PROGRESS NOTES
Daily Note     Today's date: 10/6/2022  Patient name: Jyoti Salgado  : 1935  MRN: [id number]  Referring provider: Lito Boles DO  Dx:   Encounter Diagnosis     ICD-10-CM    1  Chronic left-sided low back pain without sciatica  M54 50     G89 29                   Subjective: "It's a lot better then it was "    Objective: See treatment diary below    Assessment: Performed exercise program w/o complaint  Verbal and tactile cueing fot s/l ABD and clam shells  Responded well to manual therapies  Will monitor  Plan: Cont per POC  Precautions: Aortic Aneurysm per chart       Manuals 9/22 9/27 9/29 10/4 10/6        Lumbar paraspinal STM/TrP STM DL L focus 10' STM focus L 10' STM focus L 10' STM focus L- MO STM  focus- L  MO        Laser L lumbar paraspinal L lumbar para L lumbar paravertebrals L lum  para L lum  para          CPA lumbar Gr III Gr III - KL nv Gr III  DL Gr III  DL                     Neuro Re-Ed                                                                                                        Ther Ex             Bridge HEP 2x10 2x10 2x10 2x10        Hip abduction HEP 2x10 2x10 2x10 2x10        Clamshell HEP 2x10 2x10 2x10 2x10        LTR HEP 2x10 2x10 2x10 2x10        Hip adduction sq    5"x10 5"x20 5"x20 5"  x20        Glute stretch  3x20" 3x20" 3x20" 3x  20"        Supine march  10x 2x10 2x10 2x10                     Ther Activity             Bike  5' 8' 8' 8'                     Gait Training                                       Modalities

## 2022-10-08 ENCOUNTER — IMMUNIZATIONS (OUTPATIENT)
Dept: FAMILY MEDICINE CLINIC | Facility: CLINIC | Age: 87
End: 2022-10-08
Payer: MEDICARE

## 2022-10-08 DIAGNOSIS — Z23 ENCOUNTER FOR IMMUNIZATION: Primary | ICD-10-CM

## 2022-10-08 PROCEDURE — 90662 IIV NO PRSV INCREASED AG IM: CPT

## 2022-10-08 PROCEDURE — G0008 ADMIN INFLUENZA VIRUS VAC: HCPCS

## 2022-10-11 ENCOUNTER — OFFICE VISIT (OUTPATIENT)
Dept: PHYSICAL THERAPY | Facility: CLINIC | Age: 87
End: 2022-10-11
Payer: MEDICARE

## 2022-10-11 DIAGNOSIS — M54.50 CHRONIC LEFT-SIDED LOW BACK PAIN WITHOUT SCIATICA: Primary | ICD-10-CM

## 2022-10-11 DIAGNOSIS — G89.29 CHRONIC LEFT-SIDED LOW BACK PAIN WITHOUT SCIATICA: Primary | ICD-10-CM

## 2022-10-11 PROCEDURE — 97110 THERAPEUTIC EXERCISES: CPT | Performed by: PHYSICAL THERAPIST

## 2022-10-11 PROCEDURE — 97530 THERAPEUTIC ACTIVITIES: CPT | Performed by: PHYSICAL THERAPIST

## 2022-10-11 NOTE — PROGRESS NOTES
Daily Note     Today's date: 10/11/2022  Patient name: Raina Pacheco  : 1935  MRN: [id number]  Referring provider: Gloria Hart DO  Dx:   Encounter Diagnosis     ICD-10-CM    1  Chronic left-sided low back pain without sciatica  M54 50     G89 29                   Subjective: "I have no pain at all  I think this will be my last session "      Objective: See treatment diary below      Assessment: Tolerated treatment well  Patient exhibited good technique with therapeutic exercises and HEP review  No pain, no functional limitations voiced  As per pt, max potential reached  Pt is fully independent and safe with therex      Plan: Self D/C from PT  Precautions: Aortic Aneurysm per chart       Manuals 9/22 9/27 9/29 10/4 10/6 10/11       Lumbar paraspinal STM/TrP STM DL L focus 10' STM focus L 10' STM focus L 10' STM focus L- MO STM  focus- L  MO        Laser L lumbar paraspinal L lumbar para L lumbar paravertebrals L lum  para L lum  para          CPA lumbar Gr III Gr III - KL nv Gr III  DL Gr III  DL                     Neuro Re-Ed                                                                                                        Ther Ex             Bridge HEP 2x10 2x10 2x10 2x10 2x10 5"       Hip abduction HEP 2x10 2x10 2x10 2x10 2x10 5"       Clamshell HEP 2x10 2x10 2x10 2x10 2x10 5"       LTR HEP 2x10 2x10 2x10 2x10 2x10 5"       Hip adduction sq    5"x10 5"x20 5"x20 5"  x20 2x10 5"       Glute stretch  3x20" 3x20" 3x20" 3x  20" 3x20"       Supine march  10x 2x10 2x10 2x10 2x10                    Ther Activity             Bike  5' 8' 8' 8' 10'                    Gait Training                                       Modalities

## 2022-10-14 ENCOUNTER — APPOINTMENT (OUTPATIENT)
Dept: PHYSICAL THERAPY | Facility: CLINIC | Age: 87
End: 2022-10-14

## 2022-10-18 ENCOUNTER — APPOINTMENT (OUTPATIENT)
Dept: PHYSICAL THERAPY | Facility: CLINIC | Age: 87
End: 2022-10-18

## 2022-10-21 ENCOUNTER — APPOINTMENT (OUTPATIENT)
Dept: PHYSICAL THERAPY | Facility: CLINIC | Age: 87
End: 2022-10-21

## 2022-11-09 ENCOUNTER — IMMUNIZATIONS (OUTPATIENT)
Dept: FAMILY MEDICINE CLINIC | Facility: CLINIC | Age: 87
End: 2022-11-09

## 2022-11-09 DIAGNOSIS — Z23 ENCOUNTER FOR IMMUNIZATION: Primary | ICD-10-CM

## 2022-11-30 ENCOUNTER — OFFICE VISIT (OUTPATIENT)
Dept: FAMILY MEDICINE CLINIC | Facility: CLINIC | Age: 87
End: 2022-11-30

## 2022-11-30 VITALS
HEART RATE: 68 BPM | RESPIRATION RATE: 13 BRPM | HEIGHT: 63 IN | BODY MASS INDEX: 24.45 KG/M2 | DIASTOLIC BLOOD PRESSURE: 60 MMHG | OXYGEN SATURATION: 99 % | WEIGHT: 138 LBS | SYSTOLIC BLOOD PRESSURE: 100 MMHG | TEMPERATURE: 97.8 F

## 2022-11-30 DIAGNOSIS — I10 PRIMARY HYPERTENSION: ICD-10-CM

## 2022-11-30 DIAGNOSIS — E78.2 MIXED HYPERLIPIDEMIA: ICD-10-CM

## 2022-11-30 DIAGNOSIS — N18.32 STAGE 3B CHRONIC KIDNEY DISEASE (HCC): ICD-10-CM

## 2022-11-30 DIAGNOSIS — Z00.00 MEDICARE ANNUAL WELLNESS VISIT, SUBSEQUENT: Primary | ICD-10-CM

## 2022-11-30 DIAGNOSIS — R73.01 IMPAIRED FASTING GLUCOSE: ICD-10-CM

## 2022-11-30 DIAGNOSIS — J34.89 NASAL LESION: ICD-10-CM

## 2022-11-30 DIAGNOSIS — Z12.31 ENCOUNTER FOR SCREENING MAMMOGRAM FOR MALIGNANT NEOPLASM OF BREAST: ICD-10-CM

## 2022-11-30 PROBLEM — R39.15 URGENCY OF URINATION: Status: RESOLVED | Noted: 2022-01-05 | Resolved: 2022-11-30

## 2022-11-30 LAB
ALBUMIN SERPL-MCNC: 4.6 G/DL (ref 3.6–5.1)
ALBUMIN/GLOB SERPL: 1.6 (CALC) (ref 1–2.5)
ALP SERPL-CCNC: 94 U/L (ref 37–153)
ALT SERPL-CCNC: 20 U/L (ref 6–29)
AST SERPL-CCNC: 24 U/L (ref 10–35)
BILIRUB SERPL-MCNC: 0.8 MG/DL (ref 0.2–1.2)
BUN SERPL-MCNC: 37 MG/DL (ref 7–25)
BUN/CREAT SERPL: 26 (CALC) (ref 6–22)
CALCIUM SERPL-MCNC: 9.8 MG/DL (ref 8.6–10.4)
CHLORIDE SERPL-SCNC: 98 MMOL/L (ref 98–110)
CHOLEST SERPL-MCNC: 146 MG/DL
CHOLEST/HDLC SERPL: 2.3 (CALC)
CO2 SERPL-SCNC: 31 MMOL/L (ref 20–32)
CREAT SERPL-MCNC: 1.4 MG/DL (ref 0.6–0.95)
EST. AVERAGE GLUCOSE BLD GHB EST-MCNC: 134 MG/DL
EST. AVERAGE GLUCOSE BLD GHB EST-SCNC: 7.4 MMOL/L
GFR/BSA.PRED SERPLBLD CYS-BASED-ARV: 37 ML/MIN/1.73M2
GLOBULIN SER CALC-MCNC: 2.9 G/DL (CALC) (ref 1.9–3.7)
GLUCOSE SERPL-MCNC: 133 MG/DL (ref 65–99)
HBA1C MFR BLD: 6.3 % OF TOTAL HGB
HDLC SERPL-MCNC: 64 MG/DL
LDLC SERPL CALC-MCNC: 66 MG/DL (CALC)
NONHDLC SERPL-MCNC: 82 MG/DL (CALC)
POTASSIUM SERPL-SCNC: 4 MMOL/L (ref 3.5–5.3)
PROT SERPL-MCNC: 7.5 G/DL (ref 6.1–8.1)
SODIUM SERPL-SCNC: 137 MMOL/L (ref 135–146)
TRIGL SERPL-MCNC: 78 MG/DL
TSH SERPL-ACNC: 2.35 MIU/L (ref 0.4–4.5)

## 2022-11-30 NOTE — ASSESSMENT & PLAN NOTE
Lab Results   Component Value Date    CREATININE 1 33 (H) 04/25/2022    CREATININE 1 20 (H) 11/17/2021    CREATININE 1 36 (H) 04/30/2021   check cmp  Levels stable

## 2022-11-30 NOTE — PATIENT INSTRUCTIONS
Medicare Preventive Visit Patient Instructions  Thank you for completing your Welcome to Medicare Visit or Medicare Annual Wellness Visit today  Your next wellness visit will be due in one year (12/1/2023)  The screening/preventive services that you may require over the next 5-10 years are detailed below  Some tests may not apply to you based off risk factors and/or age  Screening tests ordered at today's visit but not completed yet may show as past due  Also, please note that scanned in results may not display below  Preventive Screenings:  Service Recommendations Previous Testing/Comments   Colorectal Cancer Screening  * Colonoscopy    * Fecal Occult Blood Test (FOBT)/Fecal Immunochemical Test (FIT)  * Fecal DNA/Cologuard Test  * Flexible Sigmoidoscopy Age: 39-70 years old   Colonoscopy: every 10 years (may be performed more frequently if at higher risk)  OR  FOBT/FIT: every 1 year  OR  Cologuard: every 3 years  OR  Sigmoidoscopy: every 5 years  Screening may be recommended earlier than age 39 if at higher risk for colorectal cancer  Also, an individualized decision between you and your healthcare provider will decide whether screening between the ages of 74-80 would be appropriate  Colonoscopy: Not on file  FOBT/FIT: Not on file  Cologuard: Not on file  Sigmoidoscopy: Not on file    Screening Not Indicated     Breast Cancer Screening Age: 36 years old  Frequency: every 1-2 years  Not required if history of left and right mastectomy Mammogram: 01/03/2022    Screening Current   Cervical Cancer Screening Between the ages of 21-29, pap smear recommended once every 3 years  Between the ages of 33-67, can perform pap smear with HPV co-testing every 5 years     Recommendations may differ for women with a history of total hysterectomy, cervical cancer, or abnormal pap smears in past  Pap Smear: Not on file    Screening Not Indicated   Hepatitis C Screening Once for adults born between 1945 and 1965  More frequently in patients at high risk for Hepatitis C Hep C Antibody: Not on file        Diabetes Screening 1-2 times per year if you're at risk for diabetes or have pre-diabetes Fasting glucose: No results in last 5 years (No results in last 5 years)  A1C: 6 5 % of total Hgb (4/25/2022)  Screening Current   Cholesterol Screening Once every 5 years if you don't have a lipid disorder  May order more often based on risk factors  Lipid panel: 04/25/2022    Screening Not Indicated  History Lipid Disorder     Other Preventive Screenings Covered by Medicare:  1  Abdominal Aortic Aneurysm (AAA) Screening: covered once if your at risk  You're considered to be at risk if you have a family history of AAA  2  Lung Cancer Screening: covers low dose CT scan once per year if you meet all of the following conditions: (1) Age 50-69; (2) No signs or symptoms of lung cancer; (3) Current smoker or have quit smoking within the last 15 years; (4) You have a tobacco smoking history of at least 20 pack years (packs per day multiplied by number of years you smoked); (5) You get a written order from a healthcare provider  3  Glaucoma Screening: covered annually if you're considered high risk: (1) You have diabetes OR (2) Family history of glaucoma OR (3)  aged 48 and older OR (3)  American aged 72 and older  3  Osteoporosis Screening: covered every 2 years if you meet one of the following conditions: (1) You're estrogen deficient and at risk for osteoporosis based off medical history and other findings; (2) Have a vertebral abnormality; (3) On glucocorticoid therapy for more than 3 months; (4) Have primary hyperparathyroidism; (5) On osteoporosis medications and need to assess response to drug therapy  · Last bone density test (DXA Scan): 03/06/2015  5  HIV Screening: covered annually if you're between the age of 12-76  Also covered annually if you are younger than 13 and older than 72 with risk factors for HIV infection  For pregnant patients, it is covered up to 3 times per pregnancy  Immunizations:  Immunization Recommendations   Influenza Vaccine Annual influenza vaccination during flu season is recommended for all persons aged >= 6 months who do not have contraindications   Pneumococcal Vaccine   * Pneumococcal conjugate vaccine = PCV13 (Prevnar 13), PCV15 (Vaxneuvance), PCV20 (Prevnar 20)  * Pneumococcal polysaccharide vaccine = PPSV23 (Pneumovax) Adults 25-60 years old: 1-3 doses may be recommended based on certain risk factors  Adults 72 years old: 1-2 doses may be recommended based off what pneumonia vaccine you previously received   Hepatitis B Vaccine 3 dose series if at intermediate or high risk (ex: diabetes, end stage renal disease, liver disease)   Tetanus (Td) Vaccine - COST NOT COVERED BY MEDICARE PART B Following completion of primary series, a booster dose should be given every 10 years to maintain immunity against tetanus  Td may also be given as tetanus wound prophylaxis  Tdap Vaccine - COST NOT COVERED BY MEDICARE PART B Recommended at least once for all adults  For pregnant patients, recommended with each pregnancy  Shingles Vaccine (Shingrix) - COST NOT COVERED BY MEDICARE PART B  2 shot series recommended in those aged 48 and above     Health Maintenance Due:      Topic Date Due   • Breast Cancer Screening: Mammogram  01/03/2023     Immunizations Due:      Topic Date Due   • Hepatitis B Vaccine (1 of 3 - 3-dose series) Never done   • COVID-19 Vaccine (4 - Booster for Garvin Peter series) 02/05/2022     Advance Directives   What are advance directives? Advance directives are legal documents that state your wishes and plans for medical care  These plans are made ahead of time in case you lose your ability to make decisions for yourself  Advance directives can apply to any medical decision, such as the treatments you want, and if you want to donate organs  What are the types of advance directives?   There are many types of advance directives, and each state has rules about how to use them  You may choose a combination of any of the following:  · Living will: This is a written record of the treatment you want  You can also choose which treatments you do not want, which to limit, and which to stop at a certain time  This includes surgery, medicine, IV fluid, and tube feedings  · Durable power of  for healthcare [facility]): This is a written record that states who you want to make healthcare choices for you when you are unable to make them for yourself  This person, called a proxy, is usually a family member or a friend  You may choose more than 1 proxy  · Do not resuscitate (DNR) order:  A DNR order is used in case your heart stops beating or you stop breathing  It is a request not to have certain forms of treatment, such as CPR  A DNR order may be included in other types of advance directives  · Medical directive: This covers the care that you want if you are in a coma, near death, or unable to make decisions for yourself  You can list the treatments you want for each condition  Treatment may include pain medicine, surgery, blood transfusions, dialysis, IV or tube feedings, and a ventilator (breathing machine)  · Values history: This document has questions about your views, beliefs, and how you feel and think about life  This information can help others choose the care that you would choose  Why are advance directives important? An advance directive helps you control your care  Although spoken wishes may be used, it is better to have your wishes written down  Spoken wishes can be misunderstood, or not followed  Treatments may be given even if you do not want them  An advance directive may make it easier for your family to make difficult choices about your care         © Copyright [employer] 2018 Information is for End User's use only and may not be sold, redistributed or otherwise used for commercial purposes   All illustrations and images included in CareNotes® are the copyrighted property of A D A M , Inc  or [facility] Avril Gamboa

## 2022-11-30 NOTE — PROGRESS NOTES
Assessment and Plan:     Problem List Items Addressed This Visit        Endocrine    Impaired fasting glucose     Check aic         Relevant Orders    Hemoglobin A1C With EAG       Cardiovascular and Mediastinum    Hypertension     Stable on current meds         Relevant Orders    Comprehensive metabolic panel       Genitourinary    Chronic kidney disease, stage 3 (HCC)     Lab Results   Component Value Date    CREATININE 1 33 (H) 04/25/2022    CREATININE 1 20 (H) 11/17/2021    CREATININE 1 36 (H) 04/30/2021   check cmp  Levels stable            Other    Hyperlipidemia     Check lipids         Relevant Orders    Lipid Panel with Direct LDL reflex    TSH, 3rd generation with Free T4 reflex    Medicare annual wellness visit, subsequent - Primary     Vaccine up to date         Nasal lesion    Relevant Orders    Ambulatory Referral to Dermatology   Other Visit Diagnoses     Encounter for screening mammogram for malignant neoplasm of breast        Relevant Orders    Mammo screening bilateral w 3d & cad          Depression Screening and Follow-up Plan: Patient was screened for depression during today's encounter  They screened negative with a PHQ-2 score of 0  Preventive health issues were discussed with patient, and age appropriate screening tests were ordered as noted in patient's After Visit Summary  Personalized health advice and appropriate referrals for health education or preventive services given if needed, as noted in patient's After Visit Summary  History of Present Illness:     Patient presents for a Medicare Wellness Visit    Here for awv  Nasal lesion since august-not healing  Overall feeling good       Patient Care Team:  Elliott Perera DO as PCP - MD Luci Rangel MD (Inactive)  DO Susan High MD Georgine Ehlers, DO     Review of Systems:     Review of Systems   Constitutional: Negative  HENT: Negative  Eyes: Negative      Respiratory: Negative  Cardiovascular: Negative  Gastrointestinal: Negative  Endocrine: Negative  Genitourinary: Negative  Musculoskeletal: Negative  Skin: Negative  Allergic/Immunologic: Negative  Neurological: Negative  Hematological: Negative  Psychiatric/Behavioral: Negative           Problem List:     Patient Active Problem List   Diagnosis   • Coronary artery disease involving native heart without angina pectoris   • Thoracic aortic aneurysm without rupture   • Hypertension   • Carotid artery obstruction, left   • Aneurysm of ascending aorta   • Arteriosclerosis of carotid artery   • Carotid atherosclerosis   • Chronic kidney disease, stage 3 (HCC)   • GERD without esophagitis   • Hyperlipidemia   • Impaired fasting glucose   • Polyp of sigmoid colon   • Medicare annual wellness visit, subsequent   • Chronic left-sided low back pain without sciatica   • Nasal lesion      Past Medical and Surgical History:     Past Medical History:   Diagnosis Date   • Aorta aneurysm ([facility] [address] )    • Cardiac disease    • Fall 5/23/2019   • GERD (gastroesophageal reflux disease)    • Hematuria     last assessed: 10/28/2013   • Hip pain, acute, left 5/23/2019   • Shortness of breath     last assessed: 6/27/2013     Past Surgical History:   Procedure Laterality Date   • ADENOIDECTOMY     • APPENDECTOMY     • BREAST BIOPSY Left    • BREAST LUMPECTOMY Left    • CATARACT EXTRACTION     • CERVICAL FUSION     • HYSTERECTOMY      age 49   • OOPHORECTOMY     • OTHER SURGICAL HISTORY      paravaginal defect graft-reinforced repair   • REPAIR RECTOCELE     • TONSILLECTOMY     • VEIN LIGATION AND STRIPPING Bilateral       Family History:     Family History   Problem Relation Age of Onset   • Diabetes Mother         mellitus   • Glaucoma Mother    • Hypertension Mother    • Macular degeneration Mother    • Stroke Father         syndrome   • Hypertension Father    • Cancer Sister    • Dementia Sister    • Heart disease Sister    • No Known Problems Daughter    • No Known Problems Daughter    • No Known Problems Sister    • No Known Problems Sister    • No Known Problems Sister    • No Known Problems Sister    • Breast cancer Sister 36   • No Known Problems Maternal Grandmother    • No Known Problems Maternal Grandfather    • No Known Problems Paternal Grandmother    • No Known Problems Paternal Grandfather    • No Known Problems Sister    • Cancer Maternal Aunt    • Cancer Maternal Aunt    • Cancer Maternal Aunt    • No Known Problems Maternal Aunt    • No Known Problems Maternal Aunt    • No Known Problems Maternal Aunt    • No Known Problems Paternal Aunt    • No Known Problems Paternal Aunt    • No Known Problems Paternal Aunt    • No Known Problems Paternal Aunt    • No Known Problems Paternal Aunt       Social History:     Social History     Socioeconomic History   • Marital status: /Civil Union     Spouse name: None   • Number of children: None   • Years of education: None   • Highest education level: None   Occupational History   • None   Tobacco Use   • Smoking status: Former     Types: Cigarettes     Quit date:      Years since quittin 9   • Smokeless tobacco: Never   Vaping Use   • Vaping Use: Never used   Substance and Sexual Activity   • Alcohol use: No   • Drug use: No   • Sexual activity: Never   Other Topics Concern   • None   Social History Narrative    Daily coffee consumption (1 cups/day)     Social Determinants of Health     Financial Resource Strain: Low Risk    • Difficulty of Paying Living Expenses: Not hard at all   Food Insecurity: Not on file   Transportation Needs: No Transportation Needs   • Lack of Transportation (Medical): No   • Lack of Transportation (Non-Medical):  No   Physical Activity: Not on file   Stress: Not on file   Social Connections: Not on file   Intimate Partner Violence: Not on file   Housing Stability: Not on file      Medications and Allergies:     Current Outpatient Medications Medication Sig Dispense Refill   • atorvastatin (LIPITOR) 10 mg tablet TAKE 1 TABLET(10 MG) BY MOUTH DAILY 90 tablet 0   • metoprolol tartrate (LOPRESSOR) 50 mg tablet TAKE 1 TABLET(50 MG) BY MOUTH EVERY 12 HOURS 180 tablet 3   • triamterene-hydrochlorothiazide (MAXZIDE-25) 37 5-25 mg per tablet TAKE 1 TABLET BY MOUTH DAILY 90 tablet 3     No current facility-administered medications for this visit  No Known Allergies   Immunizations:     Immunization History   Administered Date(s) Administered   • COVID-19 PFIZER VACCINE 0 3 ML IM 01/18/2021, 02/10/2021, 10/05/2021   • COVID-19 Pfizer Vac BIVALENT Brandt-sucrose 12 Yr+ IM (BOOSTER ONLY) 11/09/2022   • INFLUENZA 01/20/2012, 11/07/2012, 10/07/2017   • Influenza Split High Dose Preservative Free IM 10/28/2013, 10/23/2014, 09/02/2015, 09/26/2016, 10/07/2017   • Influenza, high dose seasonal 0 7 mL 10/06/2018, 10/09/2019, 09/03/2020, 10/16/2021, 10/08/2022   • Influenza, seasonal, injectable 01/01/2012   • Pneumococcal Conjugate 13-Valent 02/12/2015   • Pneumococcal Polysaccharide PPV23 08/30/2011, 01/01/2012   • Zoster 01/01/2013      Health Maintenance:         Topic Date Due   • Breast Cancer Screening: Mammogram  01/03/2023         Topic Date Due   • Hepatitis B Vaccine (1 of 3 - 3-dose series) Never done   • COVID-19 Vaccine (4 - Booster for Pfizer series) 02/05/2022      Medicare Screening Tests and Risk Assessments:     Ria Gilbert is here for her Subsequent Wellness visit  Health Risk Assessment:   Patient rates overall health as very good  Patient feels that their physical health rating is same  Patient is very satisfied with their life  Eyesight was rated as slightly worse  Hearing was rated as same  Patient feels that their emotional and mental health rating is same  Patients states they are never, rarely angry  Patient states they are never, rarely unusually tired/fatigued  Pain experienced in the last 7 days has been none   Patient states that she has experienced no weight loss or gain in last 6 months  Depression Screening:   PHQ-2 Score: 0      Fall Risk Screening: In the past year, patient has experienced: no history of falling in past year      Urinary Incontinence Screening:   Patient has not leaked urine accidently in the last six months  Home Safety:  Patient does not have trouble with stairs inside or outside of their home  Patient has working smoke alarms and has working carbon monoxide detector  Home safety hazards include: none  Nutrition:   Current diet is Regular and Limited junk food  Medications:   Patient is not currently taking any over-the-counter supplements  Patient is able to manage medications  Activities of Daily Living (ADLs)/Instrumental Activities of Daily Living (IADLs):   Walk and transfer into and out of bed and chair?: Yes  Dress and groom yourself?: Yes    Bathe or shower yourself?: Yes    Feed yourself? Yes  Do your laundry/housekeeping?: Yes  Manage your money, pay your bills and track your expenses?: Yes  Make your own meals?: Yes    Do your own shopping?: Yes    Previous Hospitalizations:   Any hospitalizations or ED visits within the last 12 months?: No      Advance Care Planning:   Living will: Yes    Durable POA for healthcare:  Yes    Advanced directive: Yes      Cognitive Screening:   Provider or family/friend/caregiver concerned regarding cognition?: No    PREVENTIVE SCREENINGS      Cardiovascular Screening:    General: Screening Not Indicated and History Lipid Disorder      Diabetes Screening:     General: Screening Current      Colorectal Cancer Screening:     General: Screening Not Indicated      Breast Cancer Screening:     General: Screening Current      Cervical Cancer Screening:    General: Screening Not Indicated      Lung Cancer Screening:     General: Screening Not Indicated      Hepatitis C Screening:    General: Screening Not Indicated    Screening, Brief Intervention, and Referral to Treatment (SBIRT)    Screening  Typical number of drinks in a day: 0  Typical number of drinks in a week: 0  Interpretation: Low risk drinking behavior  Single Item Drug Screening:  How often have you used an illegal drug (including marijuana) or a prescription medication for non-medical reasons in the past year? never    Single Item Drug Screen Score: 0  Interpretation: Negative screen for possible drug use disorder    No results found  Physical Exam:     /60 (BP Location: Left arm, Patient Position: Sitting, Cuff Size: Standard)   Pulse 68   Temp 97 8 °F (36 6 °C) (Tympanic)   Resp 13   Ht 5' 2 84" (1 596 m)   Wt 62 6 kg (138 lb)   SpO2 99%   BMI 24 57 kg/m²     Physical Exam  Vitals and nursing note reviewed  Constitutional:       Appearance: Normal appearance  She is well-developed and well-nourished  HENT:      Head: Normocephalic and atraumatic  Right Ear: External ear normal       Left Ear: External ear normal       Nose: Nose normal       Mouth/Throat:      Mouth: Oropharynx is clear and moist    Eyes:      Extraocular Movements: Extraocular movements intact and EOM normal       Conjunctiva/sclera: Conjunctivae normal       Pupils: Pupils are equal, round, and reactive to light  Cardiovascular:      Rate and Rhythm: Normal rate and regular rhythm  Pulses: Intact distal pulses  Heart sounds: Normal heart sounds  Pulmonary:      Effort: Pulmonary effort is normal       Breath sounds: Normal breath sounds  Abdominal:      General: Abdomen is flat  Bowel sounds are normal       Palpations: Abdomen is soft  Musculoskeletal:         General: Normal range of motion  Cervical back: Normal range of motion and neck supple  Skin:     General: Skin is warm and dry  Capillary Refill: Capillary refill takes less than 2 seconds  Findings: Lesion (nonhealing lesion for 4 months) present     Neurological:      Mental Status: She is alert and oriented to person, place, and time  Psychiatric:         Mood and Affect: Mood and affect normal          Behavior: Behavior normal          Thought Content:  Thought content normal          Judgment: Judgment normal           Lanie Batters, DO

## 2022-12-02 DIAGNOSIS — I25.10 CORONARY ARTERY DISEASE INVOLVING NATIVE HEART WITHOUT ANGINA PECTORIS, UNSPECIFIED VESSEL OR LESION TYPE: ICD-10-CM

## 2022-12-02 DIAGNOSIS — E78.2 MIXED HYPERLIPIDEMIA: ICD-10-CM

## 2022-12-02 RX ORDER — ATORVASTATIN CALCIUM 10 MG/1
TABLET, FILM COATED ORAL
Qty: 90 TABLET | Refills: 0 | Status: SHIPPED | OUTPATIENT
Start: 2022-12-02

## 2023-01-31 ENCOUNTER — CONSULT (OUTPATIENT)
Dept: DERMATOLOGY | Facility: CLINIC | Age: 88
End: 2023-01-31

## 2023-01-31 VITALS — HEIGHT: 65 IN | WEIGHT: 139 LBS | TEMPERATURE: 97.9 F | BODY MASS INDEX: 23.16 KG/M2

## 2023-01-31 DIAGNOSIS — D48.5 NEOPLASM OF UNCERTAIN BEHAVIOR OF SKIN: ICD-10-CM

## 2023-01-31 DIAGNOSIS — J34.89 NASAL LESION: ICD-10-CM

## 2023-01-31 DIAGNOSIS — L57.0 ACTINIC KERATOSIS: Primary | ICD-10-CM

## 2023-01-31 NOTE — PROGRESS NOTES
[facility] Note     Patient Name: Shelley Harden  Encounter Date: 01/31/2023    Have you been cared for by a [place] [id number] Dermatologist in the last 3 years and, if so, which description applies to you? NO  I am considered a "new" patient and must complete all patient intake questions  I am FEMALE/of child-bearing potential     REVIEW OF SYSTEMS:  Have you recently had or currently have any of the following? · Recent fever or chills? No  · Any non-healing wound? YES, Spot on nose that is now starting to heal  · Are you pregnant or planning to become pregnant? No  · Are you currently or planning to be nursing or breast feeding? No   PAST MEDICAL HISTORY:  Have you personally ever had or currently have any of the following? If "YES," then please provide more detail  · Skin cancer (such as Melanoma, Basal Cell Carcinoma, Squamous Cell Carcinoma? No  · Tuberculosis, HIV/AIDS, Hepatitis B or C: No  · Systemic Immunosuppression such as Diabetes, Biologic or Immunotherapy, Chemotherapy, Organ Transplantation, Bone Marrow Transplantation No  · Radiation Treatment No   FAMILY HISTORY:  Any "first degree relatives" (parent, brother, sister, or child) with the following? • Skin Cancer, Pancreatic or Other Cancer? YES, Sister had breast cancer   PATIENT EXPERIENCE:    • Do you want the Dermatologist to perform a COMPLETE skin exam today including a clinical examination under the "bra and underwear" areas? NO  • If necessary, do we have your permission to call and leave a detailed message on your Preferred Phone number that includes your specific medical information?   Yes      No Known Allergies   Current Outpatient Medications:   •  atorvastatin (LIPITOR) 10 mg tablet, TAKE 1 TABLET(10 MG) BY MOUTH DAILY, Disp: 90 tablet, Rfl: 0  •  metoprolol tartrate (LOPRESSOR) 50 mg tablet, TAKE 1 TABLET(50 MG) BY MOUTH EVERY 12 HOURS, Disp: 180 tablet, Rfl: 3  •  triamterene-hydrochlorothiazide (MAXZIDE-25) 37 5-25 mg per tablet, TAKE 1 TABLET BY MOUTH DAILY, Disp: 90 tablet, Rfl: 3          • Whom besides the patient is providing clinical information about today's encounter?   o NO ADDITIONAL HISTORIAN (patient alone provided history)    Physical Exam and Assessment/Plan by Diagnosis:        1  ACTINIC KERATOSIS    Physical Exam:  • Anatomic Location Affected:  Right nasal dorsum  • Morphological Description:  Erythematous scaly macules without palpable dermal component    Additional History of Present Condition:  N/A    Assessment and Plan:  Based on a thorough discussion of this condition and the management approach to it (including a comprehensive discussion of the known risks, side effects and potential benefits of treatment), the patient (family) agrees to implement the following specific plan:    • Precancerous nature of these lesions reviewed  Risk of progression to [facility] if untreated/unresolved after therapy reviewed  • Lesions treated today in the office with liquid nitrogen x 2 cycles per site  • Patient counseled to return to the office in 4-6 weeks for recheck if not resolved at which time retreatment of biopsy to rule out SCC will be determined based on clinic findings    Actinic keratoses are very common on sites repeatedly exposed to the sun, especially the backs of the hands and the face, most often affecting the ears, nose, cheeks, upper lip, vermilion of the lower lip, temples, forehead and balding scalp  In severely chronically sun-damaged individuals, they may also be found on the upper trunk, upper and lower limbs, and dorsum of feet  We discussed the theoretical premalignant (“pre-cancerous”) nature and etiology of these growths  We discussed the prevailing notion that actinic keratoses are a reflection of abnormal skin cell development due to DNA damage by short wavelength UVB    They are more likely to appear if the immune function is poor, due to aging, recent sun exposure, predisposing disease or certain drugs  We discussed that the main concern is that actinic keratoses may predispose to squamous cell carcinoma  It is rare for a solitary actinic keratosis to evolve to squamous cell carcinoma (SCC), but the risk of SCC occurring at some stage in a patient with more than 10 actinic keratoses is thought to be about 10 to 15%  A tender, thickened, ulcerated or enlarging actinic keratosis is suspicious of SCC  Actinic keratoses may be prevented by strict sun protection  If already present, keratoses may improve with a very high sun protection factor (50+) broad-spectrum sunscreen applied at least daily to affected areas, year-round  We recommend that UPF-rated clothing and hats and sunglasses be worn whenever possible and that a sunscreen-moisturizer combination product such as Neutrogena Daily Defense be applied at least three times a day  We performed a thorough discussion of treatment options and specific risk/benefits/alternatives including but not limited to medical “field” treatment with medications such as the following:    • Topical “field area” medications such as 5-fluorouracil or Aldara (specifically, the trouble with long-term compliance, blistering and local skin reaction versus the convenience of at-home therapy and that field therapy “gets what is not yet seen”)  • Cryotherapy (specifically, local pain, scarring, dyspigmentation, blistering, possible superinfection, and treats “only what we see” versus directed treatment today)  • Photodynamic therapy (specifically, local pain, scarring, dyspigmentation, blistering, possible superinfection, need to schedule for a later date, and time spent in the office versus field therapy that “gets what is not yet seen”)        PROCEDURE:  DESTRUCTION OF PRE-MALIGNANT LESIONS  After a thorough discussion of treatment options and risk/benefits/alternatives (including but not limited to local pain, scarring, dyspigmentation, blistering, and possible superinfection), verbal and written consent were obtained and the aforementioned lesions were treated on with cryotherapy using liquid nitrogen x 1 cycle for 5-10 seconds  • TOTAL NUMBER of 1 pre-malignant lesions were treated today on the ANATOMIC LOCATION: Right nasal dorsum  The patient tolerated the procedure well, and after-care instructions were provided  2  NEOPLASM OF UNCERTAIN BEHAVIOR OF SKIN    Physical Exam:  • (Anatomic Location); (Size and Morphological Description); (Differential Diagnosis):  o Left nasal tip 3 mm crusted papule with peripheral vessels; differential diagnosis rule out BCC  • Pertinent Positives:  • Pertinent Negatives: Additional History of Present Condition:  Located on nose; presents for 5 months  Might be improving slightly and is smaller  Assessment and Plan:  • I have discussed with the patient that a sample of skin via a "skin biopsy” would be potentially helpful to further make a specific diagnosis under the microscope  • Based on a thorough discussion of this condition and the management approach to it (including a comprehensive discussion of the known risks, side effects and potential benefits of treatment), the patient (family) agrees to implement the following specific plan:    o Procedure:  Skin Biopsy  After a thorough discussion of treatment options and risk/benefits/alternatives (including but not limited to local pain, scarring, dyspigmentation, blistering, possible superinfection, and inability to confirm a diagnosis via histopathology), verbal and written consent were obtained and portion of the rash was biopsied for tissue sample  See below for consent that was obtained from patient and subsequent Procedure Note      PROCEDURE TANGENTIAL (SHAVE) BIOPSY NOTE:    • Performing Physician: Sunitha Grayson  • Anatomic Location; Clinical Description with size (cm); Pre-Op Diagnosis:   Left nasal tip 3 mm crusted papule; differential diagnosis rule out [address]  [employer]"  • Post-op diagnosis: Same     • Local anesthesia: 1% xylocaine with epi      • Topical anesthesia: None    • Hemostasis: Aluminum chloride       After obtaining informed consent  at which time there was a discussion about the purpose of biopsy  and low risks of infection and bleeding  The area was prepped and draped in the usual fashion  Anesthesia was obtained with 1% lidocaine with epinephrine  A shave biopsy to an appropriate sampling depth was obtained by Shave (Dermablade or 15 blade) The resulting wound was covered with surgical ointment and bandaged appropriately  The patient tolerated the procedure well without complications and was without signs of functional compromise  Specimen has been sent for review by Dermatopathology  Standard post-procedure care has been explained and has been included in written form within the patient's copy of Informed Consent  INFORMED CONSENT DISCUSSION AND POST-OPERATIVE INSTRUCTIONS FOR PATIENT    I   RATIONALE FOR PROCEDURE  I understand that a skin biopsy allows the Dermatologist to test a lesion or rash under the microscope to obtain a diagnosis  It usually involves numbing the area with numbing medication and removing a small piece of skin; sometimes the area will be closed with sutures  In this specific procedure, sutures are not usually needed  If any sutures are placed, then they are usually need to be removed in 2 weeks or less  I understand that my Dermatologist recommends that a skin "shave" biopsy be performed today  A local anesthetic, similar to the kind that a dentist uses when filling a cavity, will be injected with a very small needle into the skin area to be sampled  The injected skin and tissue underneath "will go to sleep” and become numb so no pain should be felt afterwards    An instrument shaped like a tiny "razor blade" (shave biopsy instrument) will be used to cut a small piece of tissue and skin from the area so that a sample of tissue can be taken and examined more closely under the microscope  A slight amount of bleeding will occur, but it will be stopped with direct pressure and a pressure bandage and any other appropriate methods  I understands that a scar will form where the wound was created  Surgical ointment will be applied to help protect the wound  Sutures are not usually needed  II   RISKS AND POTENTIAL COMPLICATIONS   I understand the risks and potential complications of a skin biopsy include but are not limited to the following:  • Bleeding  • Infection  • Pain  • Scar/keloid  • Skin discoloration  • Incomplete Removal  • Recurrence  • Nerve Damage/Numbness/Loss of Function  • Allergic Reaction to Anesthesia  • Biopsies are diagnostic procedures and based on findings additional treatment or evaluation may be required  • Loss or destruction of specimen resulting in no additional findings    My Dermatologist has explained to me the nature of the condition, the nature of the procedure, and the benefits to be reasonably expected compared with alternative approaches  My Dermatologist has discussed the likelihood of major risks or complications of this procedure including the specific risks listed above, such as bleeding, infection, and scarring/keloid  I understand that a scar is expected after this procedure  I understand that my physician cannot predict if the scar will form a "keloid," which extends beyond the borders of the wound that is created  A keloid is a thick, painful, and bumpy scar  A keloid can be difficult to treat, as it does not always respond well to therapy, which includes injecting cortisone directly into the keloid every few weeks  While this usually reduces the pain and size of the scar, it does not eliminate it  I understand that photographs may be taken before and after the procedure    These will be maintained as part of the medical providers confidential records and may not be made available to me  I further authorize the medical provider to use the photographs for teaching purposes or to illustrate scientific papers, books, or lectures if in his/her judgment, medical research, education, or science may benefit from its use  I have had an opportunity to fully inquire about the risks and benefits of this procedure and its alternatives  I have been given ample time and opportunity to ask questions and to seek a second opinion if I wished to do so  I acknowledge that there have specifically been no guarantees as to the cosmetic results from the procedure  I am aware that with any procedure there is always the possibility of an unexpected complication  III  POST-PROCEDURAL CARE (WHAT YOU WILL NEED TO DO "AFTER THE BIOPSY" TO OPTIMIZE HEALING)    • Keep the area clean and dry  Try NOT to remove the bandage or get it wet for the first 24 hours  • Gently clean the area and apply surgical ointment (such as Vaseline petrolatum ointment, which is available "over the counter" and not a prescription) to the biopsy site for up to 2 weeks straight  This acts to protect the wound from the outside world  • Sutures are not usually placed in this procedure  If any sutures were placed, return for suture removal as instructed (generally 1 week for the face, 2 weeks for the body)  • Take Acetaminophen (Tylenol) for discomfort, if no contraindications  Ibuprofen or aspirin could make bleeding worse  • Call our office immediately for signs of infection: fever, chills, increased redness, warmth, tenderness, discomfort/pain, or pus or foul smell coming from the wound  WHAT TO DO IF THERE IS ANY BLEEDING? If a small amount of bleeding is noticed, place a clean cloth over the area and apply firm pressure for ten minutes  Check the wound after 10 minutes of direct pressure  If bleeding persists, try one more time for an additional 10 minutes of direct pressure on the area    If the bleeding becomes heavier or does not stop after the second attempt, or if you have any other questions about this procedure, then please call your [facility] Dermatologist by calling [phone] (SKIN)  I hereby acknowledge that I have reviewed and verified the site with my Dermatologist and have requested and authorized my Dermatologist to proceed with the procedure                Scribe Attestation    I,:  Griselda Brooks am acting as a scribe while in the presence of the attending physician :       I,:  Ismael Humphreys MD personally performed the services described in this documentation    as scribed in my presence :

## 2023-01-31 NOTE — PATIENT INSTRUCTIONS
1  ACTINIC KERATOSIS      Assessment and Plan:  Based on a thorough discussion of this condition and the management approach to it (including a comprehensive discussion of the known risks, side effects and potential benefits of treatment), the patient (family) agrees to implement the following specific plan:    Precancerous nature of these lesions reviewed  Risk of progression to [facility] if untreated/unresolved after therapy reviewed  Lesions treated today in the office with liquid nitrogen x 2 cycles per site  Patient counseled to return to the office in 4-6 weeks for recheck if not resolved at which time retreatment of biopsy to rule out SCC will be determined based on clinic findings    Actinic keratoses are very common on sites repeatedly exposed to the sun, especially the backs of the hands and the face, most often affecting the ears, nose, cheeks, upper lip, vermilion of the lower lip, temples, forehead and balding scalp  In severely chronically sun-damaged individuals, they may also be found on the upper trunk, upper and lower limbs, and dorsum of feet  We discussed the theoretical premalignant (“pre-cancerous”) nature and etiology of these growths  We discussed the prevailing notion that actinic keratoses are a reflection of abnormal skin cell development due to DNA damage by short wavelength UVB  They are more likely to appear if the immune function is poor, due to aging, recent sun exposure, predisposing disease or certain drugs  We discussed that the main concern is that actinic keratoses may predispose to squamous cell carcinoma  It is rare for a solitary actinic keratosis to evolve to squamous cell carcinoma (SCC), but the risk of SCC occurring at some stage in a patient with more than 10 actinic keratoses is thought to be about 10 to 15%  A tender, thickened, ulcerated or enlarging actinic keratosis is suspicious of SCC  Actinic keratoses may be prevented by strict sun protection   If already present, keratoses may improve with a very high sun protection factor (50+) broad-spectrum sunscreen applied at least daily to affected areas, year-round  We recommend that UPF-rated clothing and hats and sunglasses be worn whenever possible and that a sunscreen-moisturizer combination product such as Neutrogena Daily Defense be applied at least three times a day  We performed a thorough discussion of treatment options and specific risk/benefits/alternatives including but not limited to medical “field” treatment with medications such as the following:    Topical “field area” medications such as 5-fluorouracil or Aldara (specifically, the trouble with long-term compliance, blistering and local skin reaction versus the convenience of at-home therapy and that field therapy “gets what is not yet seen”)  Cryotherapy (specifically, local pain, scarring, dyspigmentation, blistering, possible superinfection, and treats “only what we see” versus directed treatment today)  Photodynamic therapy (specifically, local pain, scarring, dyspigmentation, blistering, possible superinfection, need to schedule for a later date, and time spent in the office versus field therapy that “gets what is not yet seen”)  PROCEDURE:  DESTRUCTION OF PRE-MALIGNANT LESIONS  Liquid nitrogen was applied for 10-12 seconds to the skin lesion and the expected blistering or scabbing reaction explained  Do not pick at the area  Patient reminded to expect hypopigmented scars from the procedure  Return if lesion fails to fully resolve  2  NEOPLASM OF UNCERTAIN BEHAVIOR OF SKIN    Assessment and Plan:  I have discussed with the patient that a sample of skin via a "skin biopsy” would be potentially helpful to further make a specific diagnosis under the microscope    Based on a thorough discussion of this condition and the management approach to it (including a comprehensive discussion of the known risks, side effects and potential benefits of treatment), the patient (family) agrees to implement the following specific plan:    Procedure:  Skin Biopsy  After a thorough discussion of treatment options and risk/benefits/alternatives (including but not limited to local pain, scarring, dyspigmentation, blistering, possible superinfection, and inability to confirm a diagnosis via histopathology), verbal and written consent were obtained and portion of the rash was biopsied for tissue sample  See below for consent that was obtained from patient and subsequent Procedure Note  PROCEDURE TANGENTIAL (SHAVE) BIOPSY NOTE:    INFORMED CONSENT DISCUSSION AND POST-OPERATIVE INSTRUCTIONS FOR PATIENT    I   RATIONALE FOR PROCEDURE  I understand that a skin biopsy allows the Dermatologist to test a lesion or rash under the microscope to obtain a diagnosis  It usually involves numbing the area with numbing medication and removing a small piece of skin; sometimes the area will be closed with sutures  In this specific procedure, sutures are not usually needed  If any sutures are placed, then they are usually need to be removed in 2 weeks or less  I understand that my Dermatologist recommends that a skin "shave" biopsy be performed today  A local anesthetic, similar to the kind that a dentist uses when filling a cavity, will be injected with a very small needle into the skin area to be sampled  The injected skin and tissue underneath "will go to sleep” and become numb so no pain should be felt afterwards  An instrument shaped like a tiny "razor blade" (shave biopsy instrument) will be used to cut a small piece of tissue and skin from the area so that a sample of tissue can be taken and examined more closely under the microscope  A slight amount of bleeding will occur, but it will be stopped with direct pressure and a pressure bandage and any other appropriate methods  I understands that a scar will form where the wound was created    Surgical ointment will be applied to help protect the wound  Sutures are not usually needed  II   RISKS AND POTENTIAL COMPLICATIONS   I understand the risks and potential complications of a skin biopsy include but are not limited to the following:  Bleeding  Infection  Pain  Scar/keloid  Skin discoloration  Incomplete Removal  Recurrence  Nerve Damage/Numbness/Loss of Function  Allergic Reaction to Anesthesia  Biopsies are diagnostic procedures and based on findings additional treatment or evaluation may be required  Loss or destruction of specimen resulting in no additional findings    My Dermatologist has explained to me the nature of the condition, the nature of the procedure, and the benefits to be reasonably expected compared with alternative approaches  My Dermatologist has discussed the likelihood of major risks or complications of this procedure including the specific risks listed above, such as bleeding, infection, and scarring/keloid  I understand that a scar is expected after this procedure  I understand that my physician cannot predict if the scar will form a "keloid," which extends beyond the borders of the wound that is created  A keloid is a thick, painful, and bumpy scar  A keloid can be difficult to treat, as it does not always respond well to therapy, which includes injecting cortisone directly into the keloid every few weeks  While this usually reduces the pain and size of the scar, it does not eliminate it  I understand that photographs may be taken before and after the procedure  These will be maintained as part of the medical providers confidential records and may not be made available to me  I further authorize the medical provider to use the photographs for teaching purposes or to illustrate scientific papers, books, or lectures if in his/her judgment, medical research, education, or science may benefit from its use      I have had an opportunity to fully inquire about the risks and benefits of this procedure and its alternatives  I have been given ample time and opportunity to ask questions and to seek a second opinion if I wished to do so  I acknowledge that there have specifically been no guarantees as to the cosmetic results from the procedure  I am aware that with any procedure there is always the possibility of an unexpected complication  III  POST-PROCEDURAL CARE (WHAT YOU WILL NEED TO DO "AFTER THE BIOPSY" TO OPTIMIZE HEALING)    Keep the area clean and dry  Try NOT to remove the bandage or get it wet for the first 24 hours  Gently clean the area and apply surgical ointment (such as Vaseline petrolatum ointment, which is available "over the counter" and not a prescription) to the biopsy site for up to 2 weeks straight  This acts to protect the wound from the outside world  Sutures are not usually placed in this procedure  If any sutures were placed, return for suture removal as instructed (generally 1 week for the face, 2 weeks for the body)  Take Acetaminophen (Tylenol) for discomfort, if no contraindications  Ibuprofen or aspirin could make bleeding worse  Call our office immediately for signs of infection: fever, chills, increased redness, warmth, tenderness, discomfort/pain, or pus or foul smell coming from the wound  WHAT TO DO IF THERE IS ANY BLEEDING? If a small amount of bleeding is noticed, place a clean cloth over the area and apply firm pressure for ten minutes  Check the wound after 10 minutes of direct pressure  If bleeding persists, try one more time for an additional 10 minutes of direct pressure on the area  If the bleeding becomes heavier or does not stop after the second attempt, or if you have any other questions about this procedure, then please call your [facility] by calling [phone] (SKIN)       I hereby acknowledge that I have reviewed and verified the site with my Dermatologist and have requested and authorized my Dermatologist to proceed with the procedure

## 2023-02-16 ENCOUNTER — OFFICE VISIT (OUTPATIENT)
Dept: CARDIOLOGY CLINIC | Facility: CLINIC | Age: 88
End: 2023-02-16

## 2023-02-16 VITALS
HEIGHT: 65 IN | HEART RATE: 63 BPM | SYSTOLIC BLOOD PRESSURE: 102 MMHG | DIASTOLIC BLOOD PRESSURE: 66 MMHG | OXYGEN SATURATION: 96 % | WEIGHT: 141 LBS | BODY MASS INDEX: 23.49 KG/M2

## 2023-02-16 DIAGNOSIS — I71.21 ANEURYSM OF ASCENDING AORTA WITHOUT RUPTURE: ICD-10-CM

## 2023-02-16 DIAGNOSIS — E78.2 MIXED HYPERLIPIDEMIA: ICD-10-CM

## 2023-02-16 DIAGNOSIS — I65.22 CAROTID ARTERY OBSTRUCTION, LEFT: ICD-10-CM

## 2023-02-16 DIAGNOSIS — I25.10 CORONARY ARTERY DISEASE INVOLVING NATIVE CORONARY ARTERY OF NATIVE HEART WITHOUT ANGINA PECTORIS: Primary | ICD-10-CM

## 2023-02-16 DIAGNOSIS — I10 PRIMARY HYPERTENSION: ICD-10-CM

## 2023-02-16 NOTE — PROGRESS NOTES
Cardiology Follow Up    Zuleyma Rico  1935  [id number]  [address] [id number] [phone]    1  Coronary artery disease involving native coronary artery of native heart without angina pectoris        2  Aneurysm of ascending aorta without rupture        3  Carotid artery obstruction, left        4  Primary hypertension        5  Mixed hyperlipidemia            Interval History: She feels well without complaints  She denies chest pain shortness of breath orthopnea paroxysmal nocturnal dyspnea or syncope      Patient Active Problem List   Diagnosis   • Coronary artery disease involving native heart without angina pectoris   • Thoracic aortic aneurysm without rupture   • Hypertension   • Carotid artery obstruction, left   • Aneurysm of ascending aorta   • Arteriosclerosis of carotid artery   • Carotid atherosclerosis   • Chronic kidney disease, stage 3 (HCC)   • GERD without esophagitis   • Hyperlipidemia   • Impaired fasting glucose   • Polyp of sigmoid colon   • Medicare annual wellness visit, subsequent   • Chronic left-sided low back pain without sciatica   • Nasal lesion     Past Medical History:   Diagnosis Date   • Aorta aneurysm ([address] )    • Cardiac disease    • Fall 2019   • GERD (gastroesophageal reflux disease)    • Hematuria     last assessed: 10/28/2013   • Hip pain, acute, left 2019   • Shortness of breath     last assessed: 2013     Social History     Socioeconomic History   • Marital status: /Civil Union     Spouse name: Not on file   • Number of children: Not on file   • Years of education: Not on file   • Highest education level: Not on file   Occupational History   • Not on file   Tobacco Use   • Smoking status: Former     Types: Cigarettes     Quit date:      Years since quittin 1   • Smokeless tobacco: Never   Vaping Use   • Vaping Use: Never used Substance and Sexual Activity   • Alcohol use: No   • Drug use: No   • Sexual activity: Never   Other Topics Concern   • Not on file   Social History Narrative    Daily coffee consumption (1 cups/day)     Social Determinants of Health     Financial Resource Strain: Low Risk    • Difficulty of Paying Living Expenses: Not hard at all   Food Insecurity: Not on file   Transportation Needs: No Transportation Needs   • Lack of Transportation (Medical): No   • Lack of Transportation (Non-Medical):  No   Physical Activity: Not on file   Stress: Not on file   Social Connections: Not on file   Intimate Partner Violence: Not on file   Housing Stability: Not on file      Family History   Problem Relation Age of Onset   • Diabetes Mother         mellitus   • Glaucoma Mother    • Hypertension Mother    • Macular degeneration Mother    • Stroke Father         syndrome   • Hypertension Father    • Cancer Sister    • Dementia Sister    • Heart disease Sister    • No Known Problems Daughter    • No Known Problems Daughter    • No Known Problems Sister    • No Known Problems Sister    • No Known Problems Sister    • No Known Problems Sister    • Breast cancer Sister 36   • No Known Problems Maternal Grandmother    • No Known Problems Maternal Grandfather    • No Known Problems Paternal Grandmother    • No Known Problems Paternal Grandfather    • No Known Problems Sister    • Cancer Maternal Aunt    • Cancer Maternal Aunt    • Cancer Maternal Aunt    • No Known Problems Maternal Aunt    • No Known Problems Maternal Aunt    • No Known Problems Maternal Aunt    • No Known Problems Paternal Aunt    • No Known Problems Paternal Aunt    • No Known Problems Paternal Aunt    • No Known Problems Paternal Aunt    • No Known Problems Paternal Aunt      Past Surgical History:   Procedure Laterality Date   • ADENOIDECTOMY     • APPENDECTOMY     • BREAST BIOPSY Left    • BREAST LUMPECTOMY Left    • CATARACT EXTRACTION     • CERVICAL FUSION     • HYSTERECTOMY      age 49   • OOPHORECTOMY     • OTHER SURGICAL HISTORY      paravaginal defect graft-reinforced repair   • REPAIR RECTOCELE     • TONSILLECTOMY     • VEIN LIGATION AND STRIPPING Bilateral        Current Outpatient Medications:   •  atorvastatin (LIPITOR) 10 mg tablet, TAKE 1 TABLET(10 MG) BY MOUTH DAILY, Disp: 90 tablet, Rfl: 0  •  metoprolol tartrate (LOPRESSOR) 50 mg tablet, TAKE 1 TABLET(50 MG) BY MOUTH EVERY 12 HOURS, Disp: 180 tablet, Rfl: 3  •  triamterene-hydrochlorothiazide (MAXZIDE-25) 37 5-25 mg per tablet, TAKE 1 TABLET BY MOUTH DAILY, Disp: 90 tablet, Rfl: 3  No Known Allergies    Labs:  No visits with results within 2 Month(s) from this visit  Latest known visit with results is:   Office Visit on 11/30/2022   Component Date Value   • Glucose, Random 11/30/2022 133 (H)    • BUN 11/30/2022 37 (H)    • Creatinine 11/30/2022 1 40 (H)    • eGFR 11/30/2022 37 (L)    • SL AMB BUN/CREATININE RA* 11/30/2022 26 (H)    • Sodium 11/30/2022 137    • Potassium 11/30/2022 4 0    • Chloride 11/30/2022 98    • CO2 11/30/2022 31    • Calcium 11/30/2022 9 8    • Protein, Total 11/30/2022 7 5    • Albumin 11/30/2022 4 6    • Globulin 11/30/2022 2 9    • Albumin/Globulin Ratio 11/30/2022 1 6    • TOTAL BILIRUBIN 11/30/2022 0 8    • Alkaline Phosphatase 11/30/2022 94    • AST 11/30/2022 24    • ALT 11/30/2022 20    • Hemoglobin A1C 11/30/2022 6 3 (H)    • Estimated Average Glucose 11/30/2022 134    • Estimated Average Glucos* 11/30/2022 7 4    • Total Cholesterol 11/30/2022 146    • HDL 11/30/2022 64    • Triglycerides 11/30/2022 78    • LDL Calculated 11/30/2022 66    • Chol HDLC Ratio 11/30/2022 2 3    • Non-HDL Cholesterol 11/30/2022 82    • TSH W/RFX TO FREE T4 11/30/2022 2 35      Imaging: No results found  Review of Systems:  Review of Systems   Constitutional: Negative for fatigue  HENT: Negative for hearing loss  Eyes: Negative for discharge  Respiratory: Negative for shortness of breath  Cardiovascular: Negative for chest pain, palpitations and leg swelling  Gastrointestinal: Negative for abdominal pain  Endocrine: Negative for polyuria  Genitourinary: Negative for dysuria  Musculoskeletal: Negative for back pain and myalgias  Skin: Negative for rash  Neurological: Negative for syncope  Hematological: Does not bruise/bleed easily  Psychiatric/Behavioral: Negative for confusion and sleep disturbance  Physical Exam:  Physical Exam  Vitals and nursing note reviewed  Constitutional:       Appearance: She is well-developed  HENT:      Head: Normocephalic and atraumatic  Neck:      Vascular: No JVD  Cardiovascular:      Rate and Rhythm: Normal rate and regular rhythm  Pulses:           Carotid pulses are on the right side with bruit and on the left side with bruit  Heart sounds: Normal heart sounds  Pulmonary:      Effort: Pulmonary effort is normal       Breath sounds: Normal breath sounds  Abdominal:      General: Bowel sounds are normal       Palpations: Abdomen is soft  Musculoskeletal:         General: Normal range of motion  Cervical back: Normal range of motion and neck supple  Skin:     General: Skin is warm and dry  Neurological:      Mental Status: She is alert and oriented to person, place, and time  Psychiatric:         Behavior: Behavior normal          Thought Content: Thought content normal          Judgment: Judgment normal          Discussion/Summary: She is asymptomatic and functional class I  We discussed her asymptomatic carotid obstruction which had  showed some progression she did not go to vascular surgery as she told me that at age 81 she does not desire to have surgery I told her this is not unreasonable  She continues on moderate dose statin with well-controlled blood pressure    Discussed with her the pros and cons of antiplatelet therapy in the setting of medical treatment of carotid disease and we have elected to proceed with her taking a 81 mg aspirin daily    I have made no changes and I will see her again in  1 year

## 2023-03-02 DIAGNOSIS — I25.10 CORONARY ARTERY DISEASE INVOLVING NATIVE HEART WITHOUT ANGINA PECTORIS, UNSPECIFIED VESSEL OR LESION TYPE: ICD-10-CM

## 2023-03-02 DIAGNOSIS — E78.2 MIXED HYPERLIPIDEMIA: ICD-10-CM

## 2023-03-02 RX ORDER — ATORVASTATIN CALCIUM 10 MG/1
TABLET, FILM COATED ORAL
Qty: 90 TABLET | Refills: 0 | Status: SHIPPED | OUTPATIENT
Start: 2023-03-02

## 2023-03-28 ENCOUNTER — TELEPHONE (OUTPATIENT)
Dept: DERMATOLOGY | Facility: CLINIC | Age: 88
End: 2023-03-28

## 2023-03-28 NOTE — TELEPHONE ENCOUNTER
Telephone encounter for [employer] [address] [place] [address] and Rennie Gitelman [address] [place] [address]  We received notification that the patient's specimen was not accounted for  Unfortunately seems that the specimen had been miss placed  We notified patient of this and she is ok with coming back into the office to have the site rechecked for possible re-biopsy

## 2023-03-30 ENCOUNTER — DOCUMENTATION (OUTPATIENT)
Dept: DERMATOLOGY | Facility: CLINIC | Age: 88
End: 2023-03-30

## 2023-03-30 NOTE — TELEPHONE ENCOUNTER
Due to missing biopsy specimen, pt has been double booked on 4/6 at 9:15 AM per Dr Melodie Miranda confirmed

## 2023-03-30 NOTE — PROGRESS NOTES
"Called patient regarding nasal shave biopsy taken from 1/31/23  Discovered this week that specimen was never \"collected\" by medical assistants and never made it to the lab  Clinic and lab were both searched for the specimen which was not found  I contacted patient to explain this and apologize, and spoke with her today  We will bring her back in to recheck the site and likely re-biopsy as she states there is still something present at the site  Patient agrees to plan and was very understanding  Will try to expedite office recheck of the site in the next 1-2 weeks    "

## 2023-04-06 ENCOUNTER — PROCEDURE VISIT (OUTPATIENT)
Dept: DERMATOLOGY | Facility: CLINIC | Age: 88
End: 2023-04-06

## 2023-04-06 DIAGNOSIS — D48.5 NEOPLASM OF UNCERTAIN BEHAVIOR OF SKIN: Primary | ICD-10-CM

## 2023-04-06 NOTE — PROGRESS NOTES
"[facility] Note     Patient Name: Angelica Sol  Encounter Date: 4/6/2023    Have you been cared for by a [facility] in the last 3 years and, if so, which description applies to you? Yes  I have been here within the last 3 years, and my medical history has NOT changed since that time  I am FEMALE/of child-bearing potential     REVIEW OF SYSTEMS:  Have you recently had or currently have any of the following? · No changes in my recent health  PAST MEDICAL HISTORY:  Have you personally ever had or currently have any of the following? If \"YES,\" then please provide more detail  · No changes in my medical history  FAMILY HISTORY:  Any \"first degree relatives\" (parent, brother, sister, or child) with the following? • No changes in my family's known health  PATIENT EXPERIENCE:    • Do you want the Dermatologist to perform a COMPLETE skin exam today including a clinical examination under the \"bra and underwear\" areas? NO  • If necessary, do we have your permission to call and leave a detailed message on your Preferred Phone number that includes your specific medical information?   Yes      No Known Allergies   Current Outpatient Medications:   •  atorvastatin (LIPITOR) 10 mg tablet, TAKE 1 TABLET(10 MG) BY MOUTH DAILY, Disp: 90 tablet, Rfl: 0  •  metoprolol tartrate (LOPRESSOR) 50 mg tablet, TAKE 1 TABLET(50 MG) BY MOUTH EVERY 12 HOURS, Disp: 180 tablet, Rfl: 3  •  triamterene-hydrochlorothiazide (MAXZIDE-25) 37 5-25 mg per tablet, TAKE 1 TABLET BY MOUTH DAILY, Disp: 90 tablet, Rfl: 3          • Whom besides the patient is providing clinical information about today's encounter?   o NO ADDITIONAL HISTORIAN (patient alone provided history)    Physical Exam and Assessment/Plan by Diagnosis:      NEOPLASM OF UNCERTAIN BEHAVIOR OF SKIN    Physical Exam:  • (Anatomic Location); (Size and Morphological Description); (Differential Diagnosis):  Left nasal tip 3 mm crusted papule; " "differential diagnosis rule out [facility]  • Pertinent Positives:  • Pertinent Negatives: Additional History of Present Condition:  Located on nose; presents for 5 months  Might be improving slightly and is smaller  Previously biopsied 1/31 however specimen was lost  Patient was informed and understands- was not found at lab and was never \"collected\" in the office or logged in biopsy book  Returns today for recheck and rebiopsy  Assessment and Plan: Given residual lesion at the site, repeat biopsy today performed  Will contact with result in 1-2 weeks  I apologized for the prior specimen being lost during processing  • I have discussed with the patient that a sample of skin via a \"skin biopsy” would be potentially helpful to further make a specific diagnosis under the microscope  • Based on a thorough discussion of this condition and the management approach to it (including a comprehensive discussion of the known risks, side effects and potential benefits of treatment), the patient (family) agrees to implement the following specific plan:    o Procedure:  Skin Biopsy  After a thorough discussion of treatment options and risk/benefits/alternatives (including but not limited to local pain, scarring, dyspigmentation, blistering, possible superinfection, and inability to confirm a diagnosis via histopathology), verbal and written consent were obtained and portion of the rash was biopsied for tissue sample  See below for consent that was obtained from patient and subsequent Procedure Note      PROCEDURE TANGENTIAL (SHAVE) BIOPSY NOTE:    • Performing Physician: Donald Padilla  • Anatomic Location; Clinical Description with size (cm); Pre-Op Diagnosis:      Specimen A: Left nasal tip 3 mm crusted papule; differential diagnosis rule    out BCC  • Post-op diagnosis: Same     • Local anesthesia: 1% xylocaine with epi      • Topical anesthesia: None    • Hemostasis: Aluminum chloride       After obtaining informed consent  at " "which time there was a discussion about the purpose of biopsy  and low risks of infection and bleeding  The area was prepped and draped in the usual fashion  Anesthesia was obtained with 1% lidocaine with epinephrine  A shave biopsy to an appropriate sampling depth was obtained by Shave (Dermablade or 15 blade) The resulting wound was covered with surgical ointment and bandaged appropriately  The patient tolerated the procedure well without complications and was without signs of functional compromise  Specimen has been sent for review by Dermatopathology  Standard post-procedure care has been explained and has been included in written form within the patient's copy of Informed Consent  INFORMED CONSENT DISCUSSION AND POST-OPERATIVE INSTRUCTIONS FOR PATIENT    I   RATIONALE FOR PROCEDURE  I understand that a skin biopsy allows the Dermatologist to test a lesion or rash under the microscope to obtain a diagnosis  It usually involves numbing the area with numbing medication and removing a small piece of skin; sometimes the area will be closed with sutures  In this specific procedure, sutures are not usually needed  If any sutures are placed, then they are usually need to be removed in 2 weeks or less  I understand that my Dermatologist recommends that a skin \"shave\" biopsy be performed today  A local anesthetic, similar to the kind that a dentist uses when filling a cavity, will be injected with a very small needle into the skin area to be sampled  The injected skin and tissue underneath \"will go to sleep” and become numb so no pain should be felt afterwards  An instrument shaped like a tiny \"razor blade\" (shave biopsy instrument) will be used to cut a small piece of tissue and skin from the area so that a sample of tissue can be taken and examined more closely under the microscope    A slight amount of bleeding will occur, but it will be stopped with direct pressure and a pressure bandage and " "any other appropriate methods  I understands that a scar will form where the wound was created  Surgical ointment will be applied to help protect the wound  Sutures are not usually needed  II   RISKS AND POTENTIAL COMPLICATIONS   I understand the risks and potential complications of a skin biopsy include but are not limited to the following:  • Bleeding  • Infection  • Pain  • Scar/keloid  • Skin discoloration  • Incomplete Removal  • Recurrence  • Nerve Damage/Numbness/Loss of Function  • Allergic Reaction to Anesthesia  • Biopsies are diagnostic procedures and based on findings additional treatment or evaluation may be required  • Loss or destruction of specimen resulting in no additional findings    My Dermatologist has explained to me the nature of the condition, the nature of the procedure, and the benefits to be reasonably expected compared with alternative approaches  My Dermatologist has discussed the likelihood of major risks or complications of this procedure including the specific risks listed above, such as bleeding, infection, and scarring/keloid  I understand that a scar is expected after this procedure  I understand that my physician cannot predict if the scar will form a \"keloid,\" which extends beyond the borders of the wound that is created  A keloid is a thick, painful, and bumpy scar  A keloid can be difficult to treat, as it does not always respond well to therapy, which includes injecting cortisone directly into the keloid every few weeks  While this usually reduces the pain and size of the scar, it does not eliminate it  I understand that photographs may be taken before and after the procedure  These will be maintained as part of the medical providers confidential records and may not be made available to me    I further authorize the medical provider to use the photographs for teaching purposes or to illustrate scientific papers, books, or lectures if in his/her judgment, " "medical research, education, or science may benefit from its use  I have had an opportunity to fully inquire about the risks and benefits of this procedure and its alternatives  I have been given ample time and opportunity to ask questions and to seek a second opinion if I wished to do so  I acknowledge that there have specifically been no guarantees as to the cosmetic results from the procedure  I am aware that with any procedure there is always the possibility of an unexpected complication  III  POST-PROCEDURAL CARE (WHAT YOU WILL NEED TO DO \"AFTER THE BIOPSY\" TO OPTIMIZE HEALING)    • Keep the area clean and dry  Try NOT to remove the bandage or get it wet for the first 24 hours  • Gently clean the area and apply surgical ointment (such as Vaseline petrolatum ointment, which is available \"over the counter\" and not a prescription) to the biopsy site for up to 2 weeks straight  This acts to protect the wound from the outside world  • Sutures are not usually placed in this procedure  If any sutures were placed, return for suture removal as instructed (generally 1 week for the face, 2 weeks for the body)  • Take Acetaminophen (Tylenol) for discomfort, if no contraindications  Ibuprofen or aspirin could make bleeding worse  • Call our office immediately for signs of infection: fever, chills, increased redness, warmth, tenderness, discomfort/pain, or pus or foul smell coming from the wound  WHAT TO DO IF THERE IS ANY BLEEDING? If a small amount of bleeding is noticed, place a clean cloth over the area and apply firm pressure for ten minutes  Check the wound after 10 minutes of direct pressure  If bleeding persists, try one more time for an additional 10 minutes of direct pressure on the area    If the bleeding becomes heavier or does not stop after the second attempt, or if you have any other questions about this procedure, then please call your [facility] by calling " [phone] (SKIN)  I hereby acknowledge that I have reviewed and verified the site with my Dermatologist and have requested and authorized my Dermatologist to proceed with the procedure            Scribe Attestation    I,:   am acting as a scribe while in the presence of the attending physician :       I,:   personally performed the services described in this documentation    as scribed in my presence :             Vane Villalpando  PGY2 Dermatology resident

## 2023-04-12 NOTE — RESULT ENCOUNTER NOTE
DERMATOPATHOLOGY RESULT NOTE    Results reviewed by ordering physician  Called patient to personally discuss results  Discussed results with patient  Also left voicemail explaining diagnosis and recommendation for Mohs on daughter Nereida's self identified phone  Left my cell phone to call me back with any questions  Tried to call daughter Oscar Padilla as well but did not reach her  Left voicemail for callback  Instructions for Clinical Derm Team:   (remember to route Result Note to appropriate staff):    Call patient and schedule for MOHS    Result & Plan by Specimen:    Specimen A: malignant [facility]  Plan: MOHs      0 Result Notes  Component   Case Report  Surgical Pathology Report                         Case: [id number]                                   Authorizing Provider: Oliverio Sherwood MD     Collected:           04/06/2023 0943              Ordering Location:     [facility]      Received:            04/06/2023 0943                                   [facility]                                                                Pathologist:           Oliverio Sherwood MD                                                       Specimen:    Skin, Other, Specimen A: Left Nasal; Shave                                               Final Diagnosis  A   Skin, Left Nasal; Shave Biopsy:  Basal cell carcinoma with infiltrative features; extending to biopsy margins      Electronically signed by Oliverio Sherwood MD on 4/12/2023 at 11:02 AM  Additional Information   All reported additional testing was performed with appropriately reactive controls   These tests were developed and their performance characteristics determined by [facility] or appropriate performing facility, though some tests may be performed on tissues which have not been validated for performance characteristics (such as staining performed on alcohol exposed cell blocks and decalcified tissues)   Results should be "interpreted with caution and in the context of the patients' clinical condition  These tests may not be cleared or approved by the [place]  Food and Drug Administration, though the FDA has determined that such clearance or approval is not necessary  These tests are used for clinical purposes and they should not be regarded as investigational or for research  This laboratory has been approved by [facility], designated as a high-complexity laboratory and is qualified to perform these tests  Katia Necessary Description   A  The specimen is received in formalin, labeled with the patient's name and hospital number, and is designated \" left nasal; shave\"  The specimen consists of a shave biopsy of tan-white skin measuring 0 5 x 0 5 x 0 1 cm  There is a 0 4 x 0 2 cm tan-brown papule that is 0 1 cm away from the peripheral margin  The apparent margin of resection is inked green  The specimen is bisected and entirely submitted between sponges in 1 cassette      Note: The estimated total formalin fixation time based upon information provided by the submitting clinician and the standard processing schedule is under 72 hours  RRavotti  Clinical Information   Specimen A: Left Nasal; Skin; Shave biopsy; 81year old female with 3 mm crusted papule; Differential diagnosis rule out BCC       ATTENTION: Xander Livers          "

## 2023-05-24 ENCOUNTER — RA CDI HCC (OUTPATIENT)
Dept: OTHER | Facility: HOSPITAL | Age: 88
End: 2023-05-24

## 2023-05-24 NOTE — PROGRESS NOTES
[address]  coding opportunities       Chart reviewed, no opportunity found: CHART REVIEWED, NO OPPORTUNITY FOUND        Patients Insurance     Medicare Insurance: Medicare

## 2023-05-31 ENCOUNTER — OFFICE VISIT (OUTPATIENT)
Dept: FAMILY MEDICINE CLINIC | Facility: CLINIC | Age: 88
End: 2023-05-31

## 2023-05-31 VITALS
RESPIRATION RATE: 16 BRPM | HEIGHT: 63 IN | OXYGEN SATURATION: 97 % | DIASTOLIC BLOOD PRESSURE: 60 MMHG | HEART RATE: 57 BPM | WEIGHT: 139 LBS | TEMPERATURE: 96.2 F | SYSTOLIC BLOOD PRESSURE: 90 MMHG | BODY MASS INDEX: 24.63 KG/M2

## 2023-05-31 DIAGNOSIS — N18.32 STAGE 3B CHRONIC KIDNEY DISEASE (HCC): ICD-10-CM

## 2023-05-31 DIAGNOSIS — I65.22 CAROTID ARTERY OBSTRUCTION, LEFT: ICD-10-CM

## 2023-05-31 DIAGNOSIS — E78.2 MIXED HYPERLIPIDEMIA: ICD-10-CM

## 2023-05-31 DIAGNOSIS — R73.01 IMPAIRED FASTING GLUCOSE: Primary | ICD-10-CM

## 2023-05-31 DIAGNOSIS — I25.10 CORONARY ARTERY DISEASE INVOLVING NATIVE HEART WITHOUT ANGINA PECTORIS, UNSPECIFIED VESSEL OR LESION TYPE: ICD-10-CM

## 2023-05-31 DIAGNOSIS — I10 PRIMARY HYPERTENSION: ICD-10-CM

## 2023-05-31 RX ORDER — ATORVASTATIN CALCIUM 10 MG/1
TABLET, FILM COATED ORAL
Qty: 90 TABLET | Refills: 0 | Status: SHIPPED | OUTPATIENT
Start: 2023-05-31

## 2023-05-31 NOTE — ASSESSMENT & PLAN NOTE
Lab Results   Component Value Date    CREATININE 1 40 (H) 11/30/2022    CREATININE 1 33 (H) 04/25/2022    CREATININE 1 20 (H) 11/17/2021    EGFR 37 (L) 11/30/2022   check levels

## 2023-06-07 LAB
ALBUMIN SERPL-MCNC: 4.4 G/DL (ref 3.6–5.1)
ALBUMIN/GLOB SERPL: 1.5 (CALC) (ref 1–2.5)
ALP SERPL-CCNC: 92 U/L (ref 37–153)
ALT SERPL-CCNC: 16 U/L (ref 6–29)
AST SERPL-CCNC: 22 U/L (ref 10–35)
BILIRUB SERPL-MCNC: 0.7 MG/DL (ref 0.2–1.2)
BUN SERPL-MCNC: 31 MG/DL (ref 7–25)
BUN/CREAT SERPL: 22 (CALC) (ref 6–22)
CALCIUM SERPL-MCNC: 10.3 MG/DL (ref 8.6–10.4)
CHLORIDE SERPL-SCNC: 101 MMOL/L (ref 98–110)
CHOLEST SERPL-MCNC: 142 MG/DL
CHOLEST/HDLC SERPL: 2.3 (CALC)
CO2 SERPL-SCNC: 31 MMOL/L (ref 20–32)
CREAT SERPL-MCNC: 1.43 MG/DL (ref 0.6–0.95)
ERYTHROCYTE [DISTWIDTH] IN BLOOD BY AUTOMATED COUNT: 12.7 % (ref 11–15)
EST. AVERAGE GLUCOSE BLD GHB EST-MCNC: 134 MG/DL
EST. AVERAGE GLUCOSE BLD GHB EST-SCNC: 7.4 MMOL/L
GFR/BSA.PRED SERPLBLD CYS-BASED-ARV: 36 ML/MIN/1.73M2
GLOBULIN SER CALC-MCNC: 2.9 G/DL (CALC) (ref 1.9–3.7)
GLUCOSE SERPL-MCNC: 139 MG/DL (ref 65–99)
HBA1C MFR BLD: 6.3 % OF TOTAL HGB
HCT VFR BLD AUTO: 42 % (ref 35–45)
HDLC SERPL-MCNC: 62 MG/DL
HGB BLD-MCNC: 14.4 G/DL (ref 11.7–15.5)
LDLC SERPL CALC-MCNC: 63 MG/DL (CALC)
MCH RBC QN AUTO: 32.1 PG (ref 27–33)
MCHC RBC AUTO-ENTMCNC: 34.3 G/DL (ref 32–36)
MCV RBC AUTO: 93.5 FL (ref 80–100)
NONHDLC SERPL-MCNC: 80 MG/DL (CALC)
PLATELET # BLD AUTO: 188 THOUSAND/UL (ref 140–400)
PMV BLD REES-ECKER: 10.3 FL (ref 7.5–12.5)
POTASSIUM SERPL-SCNC: 3.8 MMOL/L (ref 3.5–5.3)
PROT SERPL-MCNC: 7.3 G/DL (ref 6.1–8.1)
RBC # BLD AUTO: 4.49 MILLION/UL (ref 3.8–5.1)
SODIUM SERPL-SCNC: 141 MMOL/L (ref 135–146)
TRIGL SERPL-MCNC: 83 MG/DL
TSH SERPL-ACNC: 2.31 MIU/L (ref 0.4–4.5)
WBC # BLD AUTO: 5.6 THOUSAND/UL (ref 3.8–10.8)

## 2023-06-14 ENCOUNTER — PROCEDURE VISIT (OUTPATIENT)
Dept: DERMATOLOGY | Facility: CLINIC | Age: 88
End: 2023-06-14
Payer: MEDICARE

## 2023-06-14 VITALS
OXYGEN SATURATION: 98 % | SYSTOLIC BLOOD PRESSURE: 108 MMHG | TEMPERATURE: 96.1 F | DIASTOLIC BLOOD PRESSURE: 58 MMHG | WEIGHT: 140.2 LBS | BODY MASS INDEX: 24.96 KG/M2 | HEART RATE: 64 BPM

## 2023-06-14 DIAGNOSIS — C44.311 BASAL CELL CARCINOMA OF NOSE: ICD-10-CM

## 2023-06-14 PROCEDURE — 17312 MOHS ADDL STAGE: CPT | Performed by: STUDENT IN AN ORGANIZED HEALTH CARE EDUCATION/TRAINING PROGRAM

## 2023-06-14 PROCEDURE — 17311 MOHS 1 STAGE H/N/HF/G: CPT | Performed by: STUDENT IN AN ORGANIZED HEALTH CARE EDUCATION/TRAINING PROGRAM

## 2023-06-14 PROCEDURE — 14060 TIS TRNFR E/N/E/L 10 SQ CM/<: CPT | Performed by: STUDENT IN AN ORGANIZED HEALTH CARE EDUCATION/TRAINING PROGRAM

## 2023-06-14 NOTE — PROGRESS NOTES
MOHS Procedure Note    Patient: Atiya Florentino  : 1935  MRN: [id number]  Date: 2023    History of Present Illness: The patient is a 81 y o  female who presents with complaints of Basal cell carcinoma on the left nasal     Past Medical History:   Diagnosis Date   • Aorta aneurysm ([address] )    • Basal cell carcinoma 2023    Left nasal tip   • Cardiac disease    • Fall 2019   • GERD (gastroesophageal reflux disease)    • Hematuria     last assessed: 10/28/2013   • Hip pain, acute, left 2019   • Shortness of breath     last assessed: 2013       Past Surgical History:   Procedure Laterality Date   • ADENOIDECTOMY     • APPENDECTOMY     • BREAST BIOPSY Left    • BREAST LUMPECTOMY Left    • CATARACT EXTRACTION     • CERVICAL FUSION     • HYSTERECTOMY      age 49   • MOHS SURGERY Left 2023    BCC- Left nasal tip   • OOPHORECTOMY     • OTHER SURGICAL HISTORY      paravaginal defect graft-reinforced repair   • REPAIR RECTOCELE     • TONSILLECTOMY     • VEIN LIGATION AND STRIPPING Bilateral          Current Outpatient Medications:   •  atorvastatin (LIPITOR) 10 mg tablet, TAKE 1 TABLET(10 MG) BY MOUTH DAILY, Disp: 90 tablet, Rfl: 0  •  metoprolol tartrate (LOPRESSOR) 50 mg tablet, TAKE 1 TABLET(50 MG) BY MOUTH EVERY 12 HOURS, Disp: 180 tablet, Rfl: 3  •  triamterene-hydrochlorothiazide (MAXZIDE-25) 37 5-25 mg per tablet, TAKE 1 TABLET BY MOUTH DAILY, Disp: 90 tablet, Rfl: 3    No Known Allergies    Physical Exam:   Vitals:    23 0837   BP: 108/58   Pulse: 64   Temp: (!) 96 1 °F (35 6 °C)   SpO2: 98%     General: Awake, Alert, Oriented x 3, Mood and affect appropriate  Respiratory: Respirations even and unlabored  Cardiovascular: Peripheral pulses intact; no edema  Musculoskeletal Exam: n/a    Skin: 0 8 x 0 8 cm pink crusted macule       Assessment: Biopsy proven to be Basal Cell carcinoma on the left nasal      Plan: MOHS    Time of H&P Completion:8:35 am    MOHS Procedure Timeout Flowsheet Row Most Recent Value   Timeout: 4596   Patient Identity Verified: Yes   Correct Site Verified: Yes   Correct Procedure Verified: Yes          MOHS Diagnosis/Indication/Location/ID    Flowsheet Row Most Recent Value   Pathology Type Basal cell carcinoma   Anatomic Site --  [Left nasal]   Indications for MOHS tumor location   MOHS ID [id number]          MOHS Site/Accession/Pre-Post    Flowsheet Row Most Recent Value   Original Site Identified (as submitted by referring clinician) Referral, Photo   Biopsy Accession/Specimen # (as submitted by referring clincian) [id number]   Pre-MOHS Size Length (cm) 0 8   Pre-MOHS Size Width (cm) 0 8   Post-MOHS Size-Length (cm) 0 9   Post MOHS Size-Width (cm) 0 9   Repair Type [facility] Flap   Suture Type Fast absorbing gut, Vicryl   Fast Absorbing Suture Size 5   Vicryl Suture Size 5   Flap/Graft area (cm2) 1 2   Anesthetic Used 1% Lidocaine with epinephrine          MOHS Tumor Stage 1 Information    Flowsheet Row Most Recent Value   Tissue Sections (blocks) 2   Microscopic Exam Section 1: Emanating from the epidermis and infiltrating the dermis are irregularly shaped islands of basaloid keratinocytes  The cells have scant cytoplasm and round dark nuclei  Mitotic figures and apoptotic bodies are evident  The nuclei at the periphery of the islands have a palisaded arrangement  The islands are associated with a fibromyxoid stroma and there is cleft formation between some of the islands and stroma  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 No          MOHS Tumor Stage 2 Information    Flowsheet Row Most Recent Value   Tissue Sections (blocks) 1   Microscopic Exam Section 1: No tumor identified in section  Tumor Clear After Stage II? Yes                    Patient identified, procedure verified, site identified and verified  Time out completed  Surgical removal of the lesion discussed with the patient (risks and benefits, including possibility of scarring, infection, recurrence or potential for further treatment)  I have specifically identified the site with the patient  I have discussed the fact that the patient will have a scar after the procedure regardless of granulation or repair with sutures  I have discussed that the repair options can range from granulation in some cases to linear or curvilinear closures to larger flaps or grafts  There are sometimes flaps or grafts used that require multiples stages of surgery and will not be completed today, rather be completed over a series of appointments  I have discussed that occasionally due to location, size or depth of the lesion I may recommend consultation with and transfer of care for further removal or the reconstruction to another provider such as ophthalmology surgery, plastic surgery, ENT surgery, or surgical oncology  There are cases in which other testing such as imaging with MRI or CT scan or testing of lymph nodes is recommended because of the nature/depth/location of tumor seen during the removal  There is a risk of injury to nerves causing temporary or permanent numbness or the inability to move muscles full such as the inability to lift eyebrows  Questions answered and verbal and written consent was obtained  The tumor qualifies for Mohs based on AUC criteria  Dr Yudi Irvin served as the surgeon and pathologist during the procedure      With the patient in the supine position and under adequate local anesthesia with 1% lidocaine with epinephrine 1:100,000, the defect was scrubbed with Chlorhexidine  Sterile drapes were placed from the sterile tray  Because of the location of the surgical defect,  a east to [address] (unilateral) advancement flap was judged to give the best possible cosmetic and functional result  The edges of the defect were carefully debrided removing any dead or coagulated tissue  The edges of the defect and the area to be used for advancement of tissue at the base of the flap were minimally undermined  The advancement flap was freed up and draped over the defect  The flap was secured in place by a dermal layer of sutures and the cutaneous margins were approximated and closed by superficial sutures, as noted above  The standing cones of tissue at the end of the excision were excised with a #15 scalpel blade and closed in two layers as described previously  The patient tolerated the procedure well  The wound was dressed with petrolatum, a non-stick pad, and a compression dressing  Eduardo Diaz MD served as the surgeon and pathologist during the procedure  Postoperative care: Wound care discussed at length  I urged the patient to call us if any problems or question should arise  Complications: none  Post-op medications: none  Patient condition after procedure: stable  Discharge plans: Plan for follow up as needed with us and as planned for skin checks with general dermatology        Scribe Attestation    I,:  Elena Patton MA am acting as a scribe while in the presence of the attending physician :       I,:  Eduardo Diaz MD personally performed the services described in this documentation    as scribed in my presence :

## 2023-06-14 NOTE — PATIENT INSTRUCTIONS
"[facility] After Care    WOUND CARE AFTER SURGERY:    Do NOT to remove the pressure bandage for 48 hours  Keep the area clean and dry while this bandage is on  After removing the bandage for the first time, gently clean the area with soap and water  If the bandage is difficult to remove, getting the bandage wet in the shower will sometimes help soften the adhesive and allow it to be removed more easily  You will now need to cleanse this area daily in the shower with gentle soap  There is no need to scrub the area  You will need to apply plain Vaseline ointment (this is over the counter and not a prescription) to the site for up to 2 weeks followed by a clean appropriately sized bandage to area  Non stick dressing and paper tape (or Hypafix) are recommended for sensitive skin but a bandaid is fine if it covers the area well  All your stitches will dissolve over the next two weeks  You will need to keep these moist with Vaseline and covered with a bandage over the next 2 weeks for them to dissolve appropriately  POST-SURGERY SCAR CARE    We advise you start silicone scar gel to scar lines with firm massage to the scar after 2 weeks  [employer] makes a gel that you can buy and is available over the counter  You can find this in the band-aid aisle  Please remember to use sunscreen daily after your wound has healed  We recommend a broad spectrum sunscreen with SPF of at least 30 and sun protective clothing, shade, etc       RESTRICTIONS:     For two DAYS:   - You will need to take it very easy as this time is highest risk for bleeding  Being a \"couch potato\" during these two days is generally recommended  - For surgeries on the face/neck/scalp: Avoid leaning down to pick things up off the floor as this brings blood up to your head  Instead, squat down to pick things up       For two WEEKS:   - No heavy lifting (anything greater than 10 pounds)   - You can start to do slow, gentle " activities such as slow walking but nothing to increase your heart rate and blood pressure too much (such as cardiovascular exercise)  It is important to take it easy as there is still a risk for bleeding and a high risk popping of stitches open during this time  If we did surgery near the eyes (including the nose, forehead, front part of your scalp, cheeks): It is VERY common to get a large amount of swelling around your eyes (puffy eyes)  Although less frequent, this can be enough to swell your eyes shut and can also come along with bruising  This should not hurt and is very expected and normal  It is typically worst at ~ 3 days out from your surgery and dramatically better 1 week post-operatively  If we did surgery around your nose: No blowing your nose as this puts you at higher risk of popping stitches durign this time  Instead dab under your nose with a tissue or use a Q-tip inside your nose  MANAGING YOUR PAIN AFTER SURGERY     You can expect to have some pain after surgery  This is normal  The pain is typically worse the first two days after surgery, and quickly begins to get better  The best strategy for controlling your pain after surgery is around the clock pain control  You can take over the counter Acetaminophen (Tylenol) for discomfort, if no contraindications  If you are taking this at the maximum dose, you can alternate this with Motrin (ibuprofen or Advil) as well  Alternating these medications with each other allows you to maximize your pain control  In addition to Tylenol and Motrin, you can use heating pads or ice packs on your incisions to help reduce your pain  How will I alternate your regular strength over-the-counter pain medication? You will take a dose of pain medication every three hours     Start by taking 650 mg of Tylenol (2 pills of 325 mg)   3 hours later take 600 mg of Motrin (3 pills of 200 mg)   3 hours after taking the Motrin take 650 mg of Tylenol   3 hours after that take 600 mg of Motrin  See example - if your first dose of Tylenol is at 12:00 PM     12:00 PM  Tylenol 650 mg (2 pills of 325 mg)    3:00 PM  Motrin 600 mg (3 pills of 200 mg)    6:00 PM  Tylenol 650 mg (2 pills of 325 mg)    9:00 PM  Motrin 600 mg (3 pills of 200 mg)    Continue alternating every 3 hours      Important:   Do not take more than 4000mg of Tylenol or 3200mg of Motrin in a 24-hour period  What if I still have pain? If you have pain that is not controlled with the over-the-counter pain medications (Tylenol and Motrin or Advil), don't hesitate to call our staff using the number provided  We will help make sure you are managing your pain in the best way possible, and if necessary, we can provide a prescription for additional pain medication  CALL OUR OFFICE IMMEDIATELY FOR ANY SIGNS OF INFECTION:    This includes fever, chills, increased redness, warmth, tenderness, severe discomfort/pain, or pus or foul smell coming from the wound  If you are experiencing any of the above, please call [facility] directly at [phone] (SKIN)    IF BLEEDING IS NOTICED:    Place a clean cloth over the area and apply firm pressure for thirty minutes  Check the wound ONLY after 30 minutes of direct pressure; do not cheat and sneak a peak, as that does not count  If bleeding persists after 30 minutes of legitimate direct pressure, then try one more round of direct pressure to the area  Should the bleeding become heavier or not stop after the second attempt, call [facility] directly at [phone] (SKIN)  Your call will get routed to the dermatology surgeon on call even after hours

## 2023-07-06 ENCOUNTER — OFFICE VISIT (OUTPATIENT)
Dept: FAMILY MEDICINE CLINIC | Facility: CLINIC | Age: 88
End: 2023-07-06
Payer: MEDICARE

## 2023-07-06 VITALS
WEIGHT: 137 LBS | OXYGEN SATURATION: 94 % | RESPIRATION RATE: 14 BRPM | BODY MASS INDEX: 24.27 KG/M2 | TEMPERATURE: 97.4 F | HEART RATE: 59 BPM | DIASTOLIC BLOOD PRESSURE: 70 MMHG | SYSTOLIC BLOOD PRESSURE: 108 MMHG | HEIGHT: 63 IN

## 2023-07-06 DIAGNOSIS — H91.93 BILATERAL HEARING LOSS, UNSPECIFIED HEARING LOSS TYPE: ICD-10-CM

## 2023-07-06 DIAGNOSIS — R41.3 MEMORY IMPAIRMENT: Primary | ICD-10-CM

## 2023-07-06 PROCEDURE — 99215 OFFICE O/P EST HI 40 MIN: CPT | Performed by: FAMILY MEDICINE

## 2023-07-06 NOTE — PROGRESS NOTES
Jacquie Mann 1935 female MRN: [id number]    Family medicine Visit    ASSESSMENT/PLAN  Problem List Items Addressed This Visit        Other    Memory impairment - Primary     MOCA , GDS 1/15  Patient with deficits in delayed recall, attention, visual-spatial and executive thinking  Given that she has impairment, would classify patient as having mild cognitive impairment  Ordered B12 and MMA  Deferred MRI or PET scan  I referred patient for hearing evaluation as this could be impairing her cognition. Encourage use of cognitively stimulating online apps such as [employer], [employer], [employer]  Consider a falls Alert device as a safety precaution as the patient lives independently  Consider the memory cafe for socialization  Manage chronic conditions  Encourage the use of an assistive device such as a cane. Thankfully she has not had a fall in a year  Encourage patient to remain active mentally, physically and socially  Participate in cognitively challenging exercises as able  Patient will follow-up in a year to reassess cognition and perform a TUGT            Relevant Orders    Vitamin B12    Methylmalonic acid, serum   Other Visit Diagnoses     Bilateral hearing loss, unspecified hearing loss type        Relevant Orders    Ambulatory Referral to Audiology    Methylmalonic acid, serum            Future Appointments   Date Time Provider [address] [place] [address]   10/14/2023  8:15 AM [address] [place] [address]   2023  8:30 AM Jovi Manuel MD [id number] [facility]   2023  8:00 AM DO [facility] PURNIMA Vasquez  [facility]   2024  8:30 AM [facility]        SUBJECTIVE  CC: Memory Loss (Patient being seen for memory assessment )       CITLALY Mann is a 81 y.o. female who presented for memory evaluation.  Here with daughter Jose A Jenkins  Has 3 sisters that were diagnosed with dementia and are in a memory center (diagnosed in the 80's). Has noticed a decline  Over the year but more so after the pandemic. She is more repetitive. Drives. No near misses or fender huber    No issues with recalls  No hallucinations  No head trauma or concussions  Difficulty hearing and diagnosed with meniere's. Has not had a formal hearing eval  No hear attacks to stroke   Lives independently at home  No history of alcohol abuse. No tremors. Occasional falls but doesn't use an AD. Last fall was 1 year ago. No urinary incontinence   History of CAD but no stents     SOCIAL SUPPORT:   POA/Guardian: no  Where Patient Lives?:   POLST/Advanced Directives: no      SOCIAL HISTORY:  Alcohol:No   Drug:No   Tobacco: former smoker    FUNCTIONAL STATUS AND SAFETY:  1. ADLs  Independent in General: Ambulation, Transfers, Dress, Eat, Toilet and Hygiene      2. IADLs  Independent in: Meal prep, Shop, Housekeeping, Accounting and Transportation      3. In the past year, have you fallen::No   Known history of falls/fractures: no      4.  Do you feel you have hearing loss: yes    Review of Systems    Historical Information   The patient history was reviewed as follows:  Past Medical History:   Diagnosis Date   • Aorta aneurysm ([address])    • Basal cell carcinoma 06/14/2023    Left nasal tip   • Cardiac disease    • Fall 05/23/2019   • GERD (gastroesophageal reflux disease)    • Hematuria     last assessed: 10/28/2013   • Hip pain, acute, left 05/23/2019   • Shortness of breath     last assessed: 6/27/2013     Past Surgical History:   Procedure Laterality Date   • ADENOIDECTOMY     • APPENDECTOMY     • BREAST BIOPSY Left    • BREAST LUMPECTOMY Left    • CATARACT EXTRACTION     • CERVICAL FUSION     • HYSTERECTOMY      age 49   • MOHS SURGERY Left 06/14/2023    [facility]CC- Left nasal tip   • OOPHORECTOMY     • OTHER SURGICAL HISTORY      paravaginal defect graft-reinforced repair   • REPAIR RECTOCELE     • TONSILLECTOMY     • VEIN LIGATION AND STRIPPING Bilateral      Family History   Problem Relation Age of Onset   • Diabetes Mother         mellitus   • Glaucoma Mother    • Hypertension Mother    • Macular degeneration Mother    • Stroke Father         syndrome   • Hypertension Father    • Cancer Sister    • Dementia Sister    • Heart disease Sister    • No Known Problems Daughter    • No Known Problems Daughter    • No Known Problems Sister    • No Known Problems Sister    • No Known Problems Sister    • No Known Problems Sister    • Breast cancer Sister 36   • No Known Problems Maternal Grandmother    • No Known Problems Maternal Grandfather    • No Known Problems Paternal Grandmother    • No Known Problems Paternal Grandfather    • No Known Problems Sister    • Cancer Maternal Aunt    • Cancer Maternal Aunt    • Cancer Maternal Aunt    • No Known Problems Maternal Aunt    • No Known Problems Maternal Aunt    • No Known Problems Maternal Aunt    • No Known Problems Paternal Aunt    • No Known Problems Paternal Aunt    • No Known Problems Paternal Aunt    • No Known Problems Paternal Aunt    • No Known Problems Paternal Aunt       Social History   Social History     Substance and Sexual Activity   Alcohol Use No     Social History     Substance and Sexual Activity   Drug Use No     Social History     Tobacco Use   Smoking Status Former   • Types: Cigarettes   • Quit date:    • Years since quittin.6   Smokeless Tobacco Never       Medications:   Meds/Allergies   Current Outpatient Medications   Medication Sig Dispense Refill   • metoprolol tartrate (LOPRESSOR) 50 mg tablet TAKE 1 TABLET(50 MG) BY MOUTH EVERY 12 HOURS 180 tablet 3   • triamterene-hydrochlorothiazide (MAXZIDE-25) 37.5-25 mg per tablet TAKE 1 TABLET BY MOUTH DAILY 90 tablet 3   • atorvastatin (LIPITOR) 10 mg tablet TAKE 1 TABLET(10 MG) BY MOUTH DAILY 90 tablet 0     No current facility-administered medications for this visit.        No Known Allergies    OBJECTIVE  Vitals:   Vitals:    23 0818   BP: 108/70 BP Location: Left arm   Patient Position: Sitting   Cuff Size: Standard   Pulse: 59   Resp: 14   Temp: (!) 97.4 °F (36.3 °C)   TempSrc: Tympanic   SpO2: 94%   Weight: 62.1 kg (137 lb)   Height: 5' 2.84" (1.596 m)         Physical Exam  Vitals reviewed. Constitutional:       General: She is not in acute distress. Appearance: Normal appearance. She is not ill-appearing or toxic-appearing. HENT:      Head: Normocephalic and atraumatic. Eyes:      Extraocular Movements: Extraocular movements intact. Pulmonary:      Effort: Pulmonary effort is normal.   Skin:     General: Skin is warm. Neurological:      General: No focal deficit present. Mental Status: She is alert.       Gait: Gait normal.   Psychiatric:         Mood and Affect: Mood normal.         Behavior: Behavior normal.

## 2023-08-29 DIAGNOSIS — I25.10 CORONARY ARTERY DISEASE INVOLVING NATIVE HEART WITHOUT ANGINA PECTORIS, UNSPECIFIED VESSEL OR LESION TYPE: ICD-10-CM

## 2023-08-29 DIAGNOSIS — E78.2 MIXED HYPERLIPIDEMIA: ICD-10-CM

## 2023-08-29 RX ORDER — ATORVASTATIN CALCIUM 10 MG/1
TABLET, FILM COATED ORAL
Qty: 90 TABLET | Refills: 0 | Status: SHIPPED | OUTPATIENT
Start: 2023-08-29

## 2023-09-04 PROBLEM — R41.3 MEMORY IMPAIRMENT: Status: ACTIVE | Noted: 2023-09-04

## 2023-09-04 NOTE — ASSESSMENT & PLAN NOTE
MOCA 20/30, GDS 1/15  Patient with deficits in delayed recall, attention, visual-spatial and executive thinking  Given that she has impairment, would classify patient as having mild cognitive impairment  With her family history of dementia, patient is at higher risk of developing dementia herself  Ordered B12 and MMA  Deferred MRI or PET scan  I referred patient for hearing evaluation as this could be impairing her cognition. Encourage use of cognitively stimulating online apps such as [employer], [employer], [employer]  Consider a falls Alert device as a safety precaution as the patient lives independently  Consider the memory cafe for socialization  Manage chronic conditions  Encourage the use of an assistive device such as a cane.   Thankfully she has not had a fall in a year  Encourage patient to remain active mentally, physically and socially  Participate in cognitively challenging exercises as able

## 2023-09-18 ENCOUNTER — TELEPHONE (OUTPATIENT)
Age: 88
End: 2023-09-18

## 2023-09-18 DIAGNOSIS — M54.50 CHRONIC LEFT-SIDED LOW BACK PAIN WITHOUT SCIATICA: Primary | ICD-10-CM

## 2023-09-18 DIAGNOSIS — G89.29 CHRONIC LEFT-SIDED LOW BACK PAIN WITHOUT SCIATICA: Primary | ICD-10-CM

## 2023-09-18 NOTE — TELEPHONE ENCOUNTER
cell phone [phone] [facility] [phone] Chad Pulse     Patient's daughter wants to know if Dr. Saundra Morales can put an order in for Physical Therapy for Damaris's back pain. Dr. Saundra Morales has seen Alan Oneil for this issue before. The pain has been for about 2 weeks according to her daughter.   Please call Alan Oneil back    Thank you

## 2023-09-21 ENCOUNTER — EVALUATION (OUTPATIENT)
Dept: PHYSICAL THERAPY | Facility: CLINIC | Age: 88
End: 2023-09-21
Payer: MEDICARE

## 2023-09-21 DIAGNOSIS — M54.50 CHRONIC LEFT-SIDED LOW BACK PAIN WITHOUT SCIATICA: Primary | ICD-10-CM

## 2023-09-21 DIAGNOSIS — G89.29 CHRONIC LEFT-SIDED LOW BACK PAIN WITHOUT SCIATICA: Primary | ICD-10-CM

## 2023-09-21 PROCEDURE — 97161 PT EVAL LOW COMPLEX 20 MIN: CPT | Performed by: PHYSICAL THERAPIST

## 2023-09-21 PROCEDURE — 97110 THERAPEUTIC EXERCISES: CPT | Performed by: PHYSICAL THERAPIST

## 2023-09-21 NOTE — PROGRESS NOTES
PT Evaluation     Today's date: 2023  Patient name: Betzaida Coughlin  : 1935  MRN: [id number]  Referring provider: DO Maldonado Ramirez:   Encounter Diagnosis     ICD-10-CM    1. Chronic left-sided low back pain without sciatica  M54.50 Ambulatory Referral to Physical Therapy    G89.29                      Assessment  Assessment details: Pt is 80yo female presenting to therapy with recurrent low back pain with left radicular symptoms. Subjective report of constant pain made some objective testing difficult. Pt show decreased Rt LE flexibility, grossly decreased LE strength, and decreased extension and left side bending as well as sciatic nerve tension. Pt would benefit from from PT services to improve rom and strength to return to PLOF. Impairments: abnormal or restricted ROM, activity intolerance, impaired physical strength, lacks appropriate home exercise program and pain with function  Functional limitations: sitting  Symptom irritability: lowUnderstanding of Dx/Px/POC: good   Prognosis: good    Goals  1. Pt will be independent with HEP upon discharge. 2. Pt will improve lumbar rom to [facility]. 3. Pt will improve LE strength to 5/5.  4. Pt will be able to sit without increased pain for 30 minutes. Plan  Patient would benefit from: skilled physical therapy  Planned therapy interventions: functional ROM exercises, therapeutic activities, therapeutic exercise, therapeutic training, stretching, strengthening, home exercise program, neuromuscular re-education, manual therapy and patient education  Frequency: 2x week  Treatment plan discussed with: patient        Subjective Evaluation    History of Present Illness  Mechanism of injury: Pt reports Left low back pain with radicular symptoms into her foot started about 2-3 weeks ago. Pt has been here 3 times for the same pain . She was treated here in the past and it helped. Pt reports the leg pain is constant.  Pt reports can do all her ADLs but has pain with all of them due to the pain being constant. Pt reports she needs the steps to do laundry. Quality of life: good    Patient Goals  Patient goals for therapy: independence with ADLs/IADLs and decreased pain    Pain  Current pain rating: 10  At best pain rating: 10  At worst pain rating: 10  Location: Lt  posterior leg  Quality: sharp and dull ache  Exacerbated by: reports pain is constant. Treatments  Previous treatment: physical therapy        Objective     Active Range of Motion     Lumbar   Flexion:  WFL  Extension:  Restriction level: maximal  Left lateral flexion:  Restriction level: moderate  Right lateral flexion:  Restriction level: moderate  Left rotation:  Restriction level: minimal  Right rotation:  Restriction level: minimal    Additional Active Range of Motion Details  Pt rotates with extension to extend only on the right side. General LE flexibility is tighter on Rt than Lt    Strength/Myotome Testing     Left Hip   Planes of Motion   Flexion: 3+  Extension: 3+  Abduction: 3+    Right Hip   Planes of Motion   Flexion: 3+  Extension: 3+  Abduction: 3+    Left Knee   Flexion: 4  Extension: 4    Right Knee   Flexion: 4  Extension: 4    Tests     Lumbar   Positive sacral thrust.     Left   Positive passive SLR. Negative crossed SLR. Right   Negative crossed SLR and passive SLR.      Left Pelvic Girdle/Sacrum   Positive: active SLR test.     Right Pelvic Girdle/Sacrum   Negative: active SLR test.     Additional Tests Details  (+) sciatic nerve tension on Lt             Precautions: fall risk; cog deficit      Manuals 9/21            PA's to lower lumbar FH            Laser prn             Prone quad stretch FH                         Neuro Re-Ed             Standing Hip 3 ways HEP (march)            Standing back ext HEP            Sciatic nerve glide HEP                                                                Ther Ex             Bridges             Clamshells Prone Hip Ext             SLR             LTR             SKTC             Seated hamstring stretch             Bike             Ther Activity                                       Gait Training                                       Modalities

## 2023-09-27 ENCOUNTER — OFFICE VISIT (OUTPATIENT)
Dept: PHYSICAL THERAPY | Facility: CLINIC | Age: 88
End: 2023-09-27
Payer: MEDICARE

## 2023-09-27 DIAGNOSIS — G89.29 CHRONIC LEFT-SIDED LOW BACK PAIN WITHOUT SCIATICA: Primary | ICD-10-CM

## 2023-09-27 DIAGNOSIS — M54.50 CHRONIC LEFT-SIDED LOW BACK PAIN WITHOUT SCIATICA: Primary | ICD-10-CM

## 2023-09-27 PROCEDURE — 97110 THERAPEUTIC EXERCISES: CPT | Performed by: PHYSICAL THERAPIST

## 2023-09-27 PROCEDURE — 97140 MANUAL THERAPY 1/> REGIONS: CPT | Performed by: PHYSICAL THERAPIST

## 2023-09-27 NOTE — PROGRESS NOTES
Daily Note     Today's date: 2023  Patient name: Arnel Haley  : 1935  MRN: [id number]  Referring provider: Agus Astudillo DO  Dx:   Encounter Diagnosis     ICD-10-CM    1. Chronic left-sided low back pain without sciatica  M54.50     G89.29                      Subjective: Pt reports she was doing well until this morning. Her leg started to bother her this morning. Objective: See treatment diary below      Assessment: Pt tolerating exercises very well. Pt requires verbal cues and assistance for proper form and counting with exercises. Will continue to progress as able. Plan: Continue per plan of care.       Precautions: fall risk; cog deficit      Manuals            PA's to lower lumbar FH FH           Laser   16W 4' FH           Prone quad stretch FH FH                        Neuro Re-Ed             Standing Hip 3 ways HEP (march) 20xea           Standing back ext HEP            Sciatic nerve glide HEP                                                                Ther Ex             Bridges  2x10           Clamshells  2x10ea           Prone Hip Ext  10xea           SLR  2x10 ea           LTR  10x5''           SKTC  10x5''ea           Supine hamstring stretch  3x30''           Bike  8'           Ther Activity                                       Gait Training                                       Modalities

## 2023-09-29 ENCOUNTER — OFFICE VISIT (OUTPATIENT)
Dept: PHYSICAL THERAPY | Facility: CLINIC | Age: 88
End: 2023-09-29
Payer: MEDICARE

## 2023-09-29 DIAGNOSIS — G89.29 CHRONIC LEFT-SIDED LOW BACK PAIN WITHOUT SCIATICA: Primary | ICD-10-CM

## 2023-09-29 DIAGNOSIS — M54.50 CHRONIC LEFT-SIDED LOW BACK PAIN WITHOUT SCIATICA: Primary | ICD-10-CM

## 2023-09-29 PROCEDURE — 97140 MANUAL THERAPY 1/> REGIONS: CPT

## 2023-09-29 PROCEDURE — 97110 THERAPEUTIC EXERCISES: CPT

## 2023-09-29 NOTE — PROGRESS NOTES
Daily Note     Today's date: 2023  Patient name: John Meza  : 1935  MRN: [id number]  Referring provider: George Acuña DO  Dx:   Encounter Diagnosis     ICD-10-CM    1. Chronic left-sided low back pain without sciatica  M54.50     G89.29                      Subjective: "It was terrible, I was up all night with it," pain level was 10/10, and is still quite painful. Notes "the other day it was good."    Objective: See treatment diary below    Assessment: Pt was able to perform exercise program w/o complaint. Rec laser to L lumbar area. Able to bonnie mobilizations. Good relief after tx    Plan: Cont per POC.     Precautions: fall risk; cog deficit      Manuals           PA's to lower lumbar FH FH MD          Laser   16W 4' FH 16W 4' MO          Prone quad stretch FH FH MO                       Neuro Re-Ed             Standing Hip 3 ways HEP (march) 20xea 20x ea          Standing back ext HEP            Sciatic nerve glide HEP                                                                Ther Ex             Bridges  2x10 2x10          Clamshells  2x10ea 2x10 ea          Prone Hip Ext  10xea 10x ea          SLR  2x10 ea 2x10  ea          LTR  10x5'' 10x5"          SKTC  10x5''ea 10x5" ea          Supine hamstring stretch  3x30'' 3x30"          Bike  8' 8'          Ther Activity                                       Gait Training                                       Modalities

## 2023-10-09 ENCOUNTER — IMMUNIZATIONS (OUTPATIENT)
Dept: FAMILY MEDICINE CLINIC | Facility: CLINIC | Age: 88
End: 2023-10-09
Payer: MEDICARE

## 2023-10-09 DIAGNOSIS — Z23 NEED FOR INFLUENZA VACCINATION: Primary | ICD-10-CM

## 2023-10-09 PROCEDURE — G0008 ADMIN INFLUENZA VIRUS VAC: HCPCS

## 2023-10-09 PROCEDURE — 90662 IIV NO PRSV INCREASED AG IM: CPT

## 2023-10-09 NOTE — PROGRESS NOTES
Name: Renu Early      : 1935      MRN: [id number]  Encounter Provider: Yamilka Mariee  Encounter Date: 10/9/2023   Encounter department: [facility]     1. Need for influenza vaccination           Subjective      HPI  Review of Systems    Current Outpatient Medications on File Prior to Visit   Medication Sig   • atorvastatin (LIPITOR) 10 mg tablet TAKE 1 TABLET(10 MG) BY MOUTH DAILY   • metoprolol tartrate (LOPRESSOR) 50 mg tablet TAKE 1 TABLET(50 MG) BY MOUTH EVERY 12 HOURS   • triamterene-hydrochlorothiazide (MAXZIDE-25) 37.5-25 mg per tablet TAKE 1 TABLET BY MOUTH DAILY       Objective     There were no vitals taken for this visit. Yamilka Mariee

## 2023-11-27 DIAGNOSIS — I25.10 CORONARY ARTERY DISEASE INVOLVING NATIVE HEART WITHOUT ANGINA PECTORIS, UNSPECIFIED VESSEL OR LESION TYPE: ICD-10-CM

## 2023-11-27 DIAGNOSIS — E78.2 MIXED HYPERLIPIDEMIA: ICD-10-CM

## 2023-11-27 RX ORDER — ATORVASTATIN CALCIUM 10 MG/1
TABLET, FILM COATED ORAL
Qty: 90 TABLET | Refills: 0 | Status: SHIPPED | OUTPATIENT
Start: 2023-11-27

## 2023-11-28 ENCOUNTER — OFFICE VISIT (OUTPATIENT)
Dept: DERMATOLOGY | Facility: CLINIC | Age: 88
End: 2023-11-28
Payer: MEDICARE

## 2023-11-28 VITALS — HEIGHT: 63 IN | WEIGHT: 132.7 LBS | BODY MASS INDEX: 23.51 KG/M2 | TEMPERATURE: 96.9 F

## 2023-11-28 DIAGNOSIS — D22.62 MULTIPLE BENIGN MELANOCYTIC NEVI OF BOTH UPPER EXTREMITIES, BOTH LOWER EXTREMITIES, AND TRUNK: ICD-10-CM

## 2023-11-28 DIAGNOSIS — D22.5 MULTIPLE BENIGN MELANOCYTIC NEVI OF BOTH UPPER EXTREMITIES, BOTH LOWER EXTREMITIES, AND TRUNK: ICD-10-CM

## 2023-11-28 DIAGNOSIS — D22.61 MULTIPLE BENIGN MELANOCYTIC NEVI OF BOTH UPPER EXTREMITIES, BOTH LOWER EXTREMITIES, AND TRUNK: ICD-10-CM

## 2023-11-28 DIAGNOSIS — L57.0 KERATOSIS, ACTINIC: ICD-10-CM

## 2023-11-28 DIAGNOSIS — R20.2 NOTALGIA PARESTHETICA: ICD-10-CM

## 2023-11-28 DIAGNOSIS — L81.4 LENTIGO: ICD-10-CM

## 2023-11-28 DIAGNOSIS — D22.72 MULTIPLE BENIGN MELANOCYTIC NEVI OF BOTH UPPER EXTREMITIES, BOTH LOWER EXTREMITIES, AND TRUNK: ICD-10-CM

## 2023-11-28 DIAGNOSIS — D22.71 MULTIPLE BENIGN MELANOCYTIC NEVI OF BOTH UPPER EXTREMITIES, BOTH LOWER EXTREMITIES, AND TRUNK: ICD-10-CM

## 2023-11-28 DIAGNOSIS — Z85.828 HISTORY OF BASAL CELL CARCINOMA: Primary | ICD-10-CM

## 2023-11-28 DIAGNOSIS — L82.1 SEBORRHEIC KERATOSIS: ICD-10-CM

## 2023-11-28 DIAGNOSIS — D18.01 CHERRY ANGIOMA: ICD-10-CM

## 2023-11-28 PROCEDURE — 17000 DESTRUCT PREMALG LESION: CPT | Performed by: DERMATOLOGY

## 2023-11-28 PROCEDURE — 99214 OFFICE O/P EST MOD 30 MIN: CPT | Performed by: DERMATOLOGY

## 2023-11-28 NOTE — PATIENT INSTRUCTIONS
HISTORY OF BASAL CELL CARCINOMA    Assessment and Plan:  Based on a thorough discussion of this condition and the management approach to it (including a comprehensive discussion of the known risks, side effects and potential benefits of treatment), the patient (family) agrees to implement the following specific plan:  Continue with skin exams every 6 months  Use a moisturizer + sunscreen "combo" product such as Neutrogena Daily Defense SPF 50+ or CeraVe AM at least three times a day. How can basal cell carcinoma be prevented? The most important way to prevent BCC is to avoid sunburn. This is especially important in childhood and early life. Fair skinned individuals and those with a personal or family history of BCC should protect their skin from sun exposure daily, year-round and lifelong. Stay indoors or under the shade in the middle of the day   Wear covering clothing   Apply high protection factor SPF50+ broad-spectrum sunscreens generously to exposed skin if outdoors   Avoid indoor tanning (sun beds, solaria)  Oral nicotinamide (vitamin B3) in a dose of 500 mg twice daily may reduce the number and severity of BCCs. What is the outlook for basal cell carcinoma? Most BCCs are cured by treatment. Cure is most likely if treatment is undertaken when the lesion is small. About 50% of people with BCC develop a second one within 3 years of the first. They are also at increased risk of other skin cancers, especially melanoma. Regular self-skin examinations and long-term annual skin checks by an experienced health professional are recommended.      SEBORRHEIC KERATOSES  - Relevant exam: Scattered over the trunk/extremities are waxy brown to black plaques and papules with stuck on appearance and consistent dermoscopy  - Exam and clinical history consistent with seborrheic keratoses  - Counseled that these are benign growths that do not require treatment  - Counseled that removal of lesions is considered cosmetic and so would incur a fee should patient elect to move forward. MELANOCYTIC NEVI  -Relevant exam: Scattered over the trunk/extremities are homogenously pigmented brown macules and papules. ELM performed and without concerning findings. No outliers unless otherwise noted in today's note  - Exam and clinical history consistent with melanocytic nevi  - Educated on the ABCDE's of melanoma; handout provided  - Counseled to return to clinic prior to scheduled appointment should any of these lesions change or should any new lesions of concern arise  - Counseled on use of sun protection daily. Reviewed latest FDA sunscreen guidelines, including use of broad spectrum (UVA and UVB blocking) sunscreen or sun protective clothing with SPF 30-50 every 2-3 hours and reapplied after exposure to water; use of photoprotective clothing, including a broad brim hat and UPF rated clothing if outdoors for several hours; avoid use of tanning beds as these pose significant risk for melanoma and skin cancer. LENTIGINES  OTHER SKIN CHANGES DUE TO CHRONIC EXPOSURE TO NONIONIZING RADIATION  - Relevant exam: Over sun exposed areas are brown macules. ELM performed and without concerning findings. - Exam and clinical history consistent with lentigines. - Educated that these are indicative of prior sun exposure. - Counseled to return to clinic prior to scheduled appointment should any of these lesions change or should any new lesions of concern arise.  - Recommended use of sunscreen as above and below. - Counseled on use of sun protection daily.  Reviewed latest FDA sunscreen guidelines, including use of broad spectrum (UVA and UVB blocking) sunscreen or sun protective clothing with SPF 30-50 every 2-3 hours and reapplied after exposure to water; use of photoprotective clothing, including a broad brim hat and UPF rated clothing if outdoors for several hours; avoid use of tanning beds as these pose significant risk for melanoma and skin cancer. LEDESMA ANGIOMAS  - Relevant exam: Scattered over the trunk/extremities are red papules  - Exam and clinical history consistent with cherry angiomas  - Educated that these are benign  - Educated that removal is considered aesthetic and would incur a fee. ACTINIC KERATOSES  - Relevant exam: On the right forehead are scaly pink macules without palpable dermal component    - Exam and clinical history consistent with actinic keratoses  - Discussed that these lesions are considered premalignant with the potential to evolve into squamous cell carcinoma. - Discussed that these are due to chronic sun exposure and therefore recommend use of sunscreen/sun protection to prevent further sun damage  - Discussed treatment options, including liquid nitrogen destruction, topical immunotherapy, and photodynamic therapy, including risks, benefits  - Patient counseled to return to the office in 4-6 weeks after completion of treatment for recheck if not resolved at which time retreatment or biopsy to rule out SCC will be determined based on clinic findings      PROCEDURE:  DESTRUCTION OF PRE-MALIGNANT LESIONS    - After a thorough discussion of treatment options and risk/benefits/alternatives (including but not limited to local pain, scarring, dyspigmentation, blistering, and possible superinfection), verbal and written consent were obtained and the aforementioned lesions were treated on with cryotherapy using liquid nitrogen x 1 cycle for 5-10 seconds. TOTAL NUMBER of 1 pre-malignant lesions were treated today on the ANATOMIC LOCATION: right forehead. The patient tolerated the procedure well, and after-care instructions were provided. Precancerous nature of these lesions reviewed.  Risk of progression to [facility] if untreated/unresolved after therapy reviewed  Lesions treated today in the office with liquid nitrogen x 2 cycles per site  Patient counseled to return to the office in 4-6 weeks for recheck if not resolved at which time retreatment of biopsy to rule out SCC will be determined based on clinic findings    NOTALGIA PARESTHETICA    Assessment and Plan:  Based on a thorough discussion of this condition and the management approach to it (including a comprehensive discussion of the known risks, side effects and potential benefits of treatment), the patient (family) agrees to implement the following specific plan:  Reassured benign    Assessment and Plan:  Notalgia paresthetica is a condition where itch and changed sensation arise in the areas of skin on the medial aspect of the shoulder blade on either side of the back. Notalgia means pain in the back, and paraesthetica refers to burning pain, tingling or itch. It is also called thoracic cutaneous nerve entrapment syndrome. The nerves which supply sensation to the upper back emerge from the spinal cord (2nd to 6th thoracic segments) and run a long course up through the thick muscles of the back. They make a right-angled turn before reaching the skin. The nerves appear to be vulnerable to compression or traction. Partial compression or injury leads to the symptoms. Initial injury to the nerves may include:  Back injury  Herniated or "slipped" disc  Herpes zoster (shingles)  Sunburn  Myelopathy (muscular disease)  Small fiber neuropathy    Notalgia paresthetica often starts after a period of intense exercise leading to muscular stiffness, or a period of inactivity. Sometimes, a specific injury may be recalled. It is characterized by intense itch on the medial border of one scapula or both, ie, between the shoulder blades. This itch can be intermittent or continuous. It is unrelieved by scratching, although the scratching and rubbing may be pleasurable. The affected area may spread to both shoulder blades and more widely over the back and shoulders. In many patients, there are no visible signs.    Visible changes often arise from rubbing and scratching the affected area. These include:  Scratch marks  Hyperpigmentation (brown marks)  Hypopigmentation (white marks)  Lichen simplex (a type of eczema)  Scarring    There may be changed sensation in the affected area of skin in response to light touch, sharp objects, cold, and heat. There may be reduced or absent sweating in the affected area. Radiology such as X-ray, CT scan or MRI may demonstrate a degenerative vertebra or prolapsed disc in the area that corresponds to the nerve supply to the affected skin. In many cases, no abnormality is revealed. Treatment is not always necessary, and it is not always successful. Typical management may include the following:  Cooling lotions or creams as required (camphor and menthol)  Topical steroids to treat associated lichen simplex  Capsaicin cream - this depletes nerve endings of their chemical transmitters but requires frequent reapplication and causes unpleasant local side effects  Local anesthetic creams may provide temporary relief of symptoms  Amitriptyline or other oral tricyclic medications at night to help sleep and counteract neuropathic symptoms  Gabapentin, pregabalin or other anticonvulsants can relieve unpleasant burning or tingling sensations  Transcutaneous electrical nerve stimulation (TENS)  Surgical decompression of vertebral nerve impingement    Physical therapy with repetitive exercises and stretches for the upper back has been reported to be effective.     While sitting, cross arms and bend forwards to stretch upper back  Arms at sides, raise shoulders and rotate them forwards and backwards  Arms straight, rotate forwards 360 degrees and backwards 360 degrees  Rotate upper body left and right until stretch is felt and hold  Massage the muscles besides the spine in the affected area

## 2023-11-28 NOTE — PROGRESS NOTES
[facility] Note     Patient Name: Asim Cheema  Encounter Date: 11/28/2023     Have you been cared for by a [facility]ologist in the last 3 years and, if so, which description applies to you? Yes. I have been here within the last 3 years, and my medical history has NOT changed since that time. I am FEMALE/of child-bearing potential.    REVIEW OF SYSTEMS:  Have you recently had or currently have any of the following? No changes in my recent health. PAST MEDICAL HISTORY:  Have you personally ever had or currently have any of the following? If "YES," then please provide more detail. No changes in my medical history. HISTORY OF IMMUNOSUPPRESSION: Do you have a history of any of the following:  Systemic Immunosuppression such as Diabetes, Biologic or Immunotherapy, Chemotherapy, Organ Transplantation, Bone Marrow Transplantation? No     Answering "YES" requires the addition of the dotphrase "IMMUNOSUPPRESSED" as the first diagnosis of the patient's visit. FAMILY HISTORY:  Any "first degree relatives" (parent, brother, sister, or child) with the following? No changes in my family's known health. PATIENT EXPERIENCE:    Do you want the Dermatologist to perform a COMPLETE skin exam today including a clinical examination under the "bra and underwear" areas? Yes  If necessary, do we have your permission to call and leave a detailed message on your Preferred Phone number that includes your specific medical information?   Yes      No Known Allergies   Current Outpatient Medications:     atorvastatin (LIPITOR) 10 mg tablet, TAKE 1 TABLET(10 MG) BY MOUTH DAILY, Disp: 90 tablet, Rfl: 0    metoprolol tartrate (LOPRESSOR) 50 mg tablet, TAKE 1 TABLET(50 MG) BY MOUTH EVERY 12 HOURS, Disp: 180 tablet, Rfl: 3    triamterene-hydrochlorothiazide (MAXZIDE-25) 37.5-25 mg per tablet, TAKE 1 TABLET BY MOUTH DAILY, Disp: 90 tablet, Rfl: 3          Whom besides the patient is providing clinical information about today's encounter? NO ADDITIONAL HISTORIAN (patient alone provided history)    Physical Exam and Assessment/Plan by Diagnosis:      HISTORY OF BASAL CELL CARCINOMA    Physical Exam:  Anatomic Location Affected:  Left nasal tip  Morphological Description of scar:  Well healed scar  Suspected Recurrence: No  Pertinent Positives:  Pertinent Negatives: Additional History of Present Condition:  History of basal cell carcinoma with MOHS performed 6/14/2023 and no sign of recurrence. Previous accession # [id number]. Assessment and Plan:  Based on a thorough discussion of this condition and the management approach to it (including a comprehensive discussion of the known risks, side effects and potential benefits of treatment), the patient (family) agrees to implement the following specific plan:  Continue with skin exams every 6 months  Use a moisturizer + sunscreen "combo" product such as Neutrogena Daily Defense SPF 50+ or CeraVe AM at least three times a day. How can basal cell carcinoma be prevented? The most important way to prevent BCC is to avoid sunburn. This is especially important in childhood and early life. Fair skinned individuals and those with a personal or family history of BCC should protect their skin from sun exposure daily, year-round and lifelong. Stay indoors or under the shade in the middle of the day   Wear covering clothing   Apply high protection factor SPF50+ broad-spectrum sunscreens generously to exposed skin if outdoors   Avoid indoor tanning (sun beds, solaria)  Oral nicotinamide (vitamin B3) in a dose of 500 mg twice daily may reduce the number and severity of BCCs. What is the outlook for basal cell carcinoma? Most BCCs are cured by treatment. Cure is most likely if treatment is undertaken when the lesion is small.   About 50% of people with BCC develop a second one within 3 years of the first. They are also at increased risk of other skin cancers, especially melanoma. Regular self-skin examinations and long-term annual skin checks by an experienced health professional are recommended. SEBORRHEIC KERATOSES  - Relevant exam: Scattered over the trunk/extremities are waxy brown to black plaques and papules with stuck on appearance and consistent dermoscopy  - Exam and clinical history consistent with seborrheic keratoses  - Counseled that these are benign growths that do not require treatment  - Counseled that removal of lesions is considered cosmetic and so would incur a fee should patient elect to move forward. MELANOCYTIC NEVI  -Relevant exam: Scattered over the trunk/extremities are homogenously pigmented brown macules and papules. ELM performed and without concerning findings. No outliers unless otherwise noted in today's note  - Exam and clinical history consistent with melanocytic nevi  - Educated on the ABCDE's of melanoma; handout provided  - Counseled to return to clinic prior to scheduled appointment should any of these lesions change or should any new lesions of concern arise  - Counseled on use of sun protection daily. Reviewed latest FDA sunscreen guidelines, including use of broad spectrum (UVA and UVB blocking) sunscreen or sun protective clothing with SPF 30-50 every 2-3 hours and reapplied after exposure to water; use of photoprotective clothing, including a broad brim hat and UPF rated clothing if outdoors for several hours; avoid use of tanning beds as these pose significant risk for melanoma and skin cancer. LENTIGINES  OTHER SKIN CHANGES DUE TO CHRONIC EXPOSURE TO NONIONIZING RADIATION  - Relevant exam: Over sun exposed areas are brown macules. ELM performed and without concerning findings. - Exam and clinical history consistent with lentigines. - Educated that these are indicative of prior sun exposure.    - Counseled to return to clinic prior to scheduled appointment should any of these lesions change or should any new lesions of concern arise.  - Recommended use of sunscreen as above and below. - Counseled on use of sun protection daily. Reviewed latest FDA sunscreen guidelines, including use of broad spectrum (UVA and UVB blocking) sunscreen or sun protective clothing with SPF 30-50 every 2-3 hours and reapplied after exposure to water; use of photoprotective clothing, including a broad brim hat and UPF rated clothing if outdoors for several hours; avoid use of tanning beds as these pose significant risk for melanoma and skin cancer. CHERRY ANGIOMAS  - Relevant exam: Scattered over the trunk/extremities are red papules  - Exam and clinical history consistent with cherry angiomas  - Educated that these are benign  - Educated that removal is considered aesthetic and would incur a fee. ACTINIC KERATOSES  - Relevant exam: On the right forehead are scaly pink macules without palpable dermal component    - Exam and clinical history consistent with actinic keratoses  - Discussed that these lesions are considered premalignant with the potential to evolve into squamous cell carcinoma.    - Discussed that these are due to chronic sun exposure and therefore recommend use of sunscreen/sun protection to prevent further sun damage  - Discussed treatment options, including liquid nitrogen destruction, topical immunotherapy, and photodynamic therapy, including risks, benefits  - Patient counseled to return to the office in 4-6 weeks after completion of treatment for recheck if not resolved at which time retreatment or biopsy to rule out SCC will be determined based on clinic findings      PROCEDURE:  DESTRUCTION OF PRE-MALIGNANT LESIONS    - After a thorough discussion of treatment options and risk/benefits/alternatives (including but not limited to local pain, scarring, dyspigmentation, blistering, and possible superinfection), verbal and written consent were obtained and the aforementioned lesions were treated on with cryotherapy using liquid nitrogen x 1 cycle for 5-10 seconds. TOTAL NUMBER of 1 pre-malignant lesions were treated today on the ANATOMIC LOCATION: right forehead. The patient tolerated the procedure well, and after-care instructions were provided.           Scribe Attestation      I,:  Sharon Angulo am acting as a scribe while in the presence of the attending physician.:       I,:  Mary Isaac MD personally performed the services described in this documentation    as scribed in my presence.:

## 2023-12-06 ENCOUNTER — OFFICE VISIT (OUTPATIENT)
Dept: FAMILY MEDICINE CLINIC | Facility: CLINIC | Age: 88
End: 2023-12-06
Payer: MEDICARE

## 2023-12-06 VITALS
BODY MASS INDEX: 23.6 KG/M2 | WEIGHT: 133.2 LBS | SYSTOLIC BLOOD PRESSURE: 122 MMHG | DIASTOLIC BLOOD PRESSURE: 74 MMHG | OXYGEN SATURATION: 94 % | HEART RATE: 62 BPM | HEIGHT: 63 IN | TEMPERATURE: 95.3 F | RESPIRATION RATE: 15 BRPM

## 2023-12-06 DIAGNOSIS — Z00.00 MEDICARE ANNUAL WELLNESS VISIT, SUBSEQUENT: Primary | ICD-10-CM

## 2023-12-06 DIAGNOSIS — M25.512 CHRONIC LEFT SHOULDER PAIN: ICD-10-CM

## 2023-12-06 DIAGNOSIS — I10 PRIMARY HYPERTENSION: ICD-10-CM

## 2023-12-06 DIAGNOSIS — R73.01 IMPAIRED FASTING GLUCOSE: ICD-10-CM

## 2023-12-06 DIAGNOSIS — N18.32 STAGE 3B CHRONIC KIDNEY DISEASE (HCC): ICD-10-CM

## 2023-12-06 DIAGNOSIS — Z12.31 ENCOUNTER FOR SCREENING MAMMOGRAM FOR MALIGNANT NEOPLASM OF BREAST: ICD-10-CM

## 2023-12-06 DIAGNOSIS — G89.29 CHRONIC LEFT SHOULDER PAIN: ICD-10-CM

## 2023-12-06 DIAGNOSIS — E78.2 MIXED HYPERLIPIDEMIA: ICD-10-CM

## 2023-12-06 PROCEDURE — G0439 PPPS, SUBSEQ VISIT: HCPCS | Performed by: FAMILY MEDICINE

## 2023-12-06 NOTE — PROGRESS NOTES
Assessment and Plan:     Problem List Items Addressed This Visit     Hypertension    Relevant Orders    CBC    Comprehensive metabolic panel    Chronic kidney disease, stage 3 ([address])    Relevant Orders    Comprehensive metabolic panel    Hyperlipidemia    Relevant Orders    Lipid Panel with Direct LDL reflex    TSH, 3rd generation with Free T4 reflex    Impaired fasting glucose    Relevant Orders    Hemoglobin A1C With EAG    Medicare annual wellness visit, subsequent - Primary    Chronic left shoulder pain    Relevant Orders    Ambulatory Referral to Orthopedic Surgery   Other Visit Diagnoses     Encounter for screening mammogram for malignant neoplasm of breast        Relevant Orders    Mammo screening bilateral w 3d & cad          Depression Screening and Follow-up Plan: Patient was screened for depression during today's encounter. They screened negative with a PHQ-2 score of 0. Preventive health issues were discussed with patient, and age appropriate screening tests were ordered as noted in patient's After Visit Summary. Personalized health advice and appropriate referrals for health education or preventive services given if needed, as noted in patient's After Visit Summary. History of Present Illness:     Patient presents for a Medicare Wellness Visit    Here for awv  Left shoulder pain  No trauma       Patient Care Team:  Radha Briceno DO as PCP - MD Jeanine Copeland MD (Inactive)  DO Ezekiel Marcial MD Roann Minors, DO     Review of Systems:     Review of Systems   Constitutional: Negative. HENT: Negative. Eyes: Negative. Respiratory: Negative. Cardiovascular: Negative. Gastrointestinal: Negative. Endocrine: Negative. Genitourinary: Negative. Musculoskeletal:  Positive for arthralgias (shoulder pain). Allergic/Immunologic: Negative. Neurological: Negative. Hematological: Negative.     Psychiatric/Behavioral: Negative.           Problem List:     Patient Active Problem List   Diagnosis   • Coronary artery disease involving native heart without angina pectoris   • Thoracic aortic aneurysm without rupture ([address])   • Hypertension   • Carotid artery obstruction, left   • Aneurysm of ascending aorta (HCC)   • Arteriosclerosis of carotid artery   • Carotid atherosclerosis   • Chronic kidney disease, stage 3 (HCC)   • GERD without esophagitis   • Hyperlipidemia   • Impaired fasting glucose   • Polyp of sigmoid colon   • Medicare annual wellness visit, subsequent   • Chronic left-sided low back pain without sciatica   • Nasal lesion   • Memory impairment   • Chronic left shoulder pain      Past Medical and Surgical History:     Past Medical History:   Diagnosis Date   • Aorta aneurysm ([address])    • Basal cell carcinoma 06/14/2023    Left nasal tip   • Cardiac disease    • Fall 05/23/2019   • GERD (gastroesophageal reflux disease)    • Hematuria     last assessed: 10/28/2013   • Hip pain, acute, left 05/23/2019   • Shortness of breath     last assessed: 6/27/2013     Past Surgical History:   Procedure Laterality Date   • ADENOIDECTOMY     • APPENDECTOMY     • BREAST BIOPSY Left    • BREAST LUMPECTOMY Left    • CATARACT EXTRACTION     • CERVICAL FUSION     • HYSTERECTOMY      age 49   • MOHS SURGERY Left 06/14/2023    [facility]- Left nasal tip   • OOPHORECTOMY     • OTHER SURGICAL HISTORY      paravaginal defect graft-reinforced repair   • REPAIR RECTOCELE     • TONSILLECTOMY     • VEIN LIGATION AND STRIPPING Bilateral       Family History:     Family History   Problem Relation Age of Onset   • Diabetes Mother         mellitus   • Glaucoma Mother    • Hypertension Mother    • Macular degeneration Mother    • Stroke Father         syndrome   • Hypertension Father    • Cancer Sister    • Dementia Sister    • Heart disease Sister    • No Known Problems Daughter    • No Known Problems Daughter    • No Known Problems Sister    • No Known Problems Sister    • No Known Problems Sister    • No Known Problems Sister    • Breast cancer Sister 36   • No Known Problems Maternal Grandmother    • No Known Problems Maternal Grandfather    • No Known Problems Paternal Grandmother    • No Known Problems Paternal Grandfather    • No Known Problems Sister    • Cancer Maternal Aunt    • Cancer Maternal Aunt    • Cancer Maternal Aunt    • No Known Problems Maternal Aunt    • No Known Problems Maternal Aunt    • No Known Problems Maternal Aunt    • No Known Problems Paternal Aunt    • No Known Problems Paternal Aunt    • No Known Problems Paternal Aunt    • No Known Problems Paternal Aunt    • No Known Problems Paternal Aunt       Social History:     Social History     Socioeconomic History   • Marital status: /Civil Union     Spouse name: None   • Number of children: None   • Years of education: None   • Highest education level: None   Occupational History   • None   Tobacco Use   • Smoking status: Former     Types: Cigarettes     Quit date:      Years since quittin.9   • Smokeless tobacco: Never   Vaping Use   • Vaping Use: Never used   Substance and Sexual Activity   • Alcohol use: No   • Drug use: No   • Sexual activity: Never   Other Topics Concern   • None   Social History Narrative    Daily coffee consumption (1 cups/day)     Social Determinants of Health     Financial Resource Strain: Low Risk  (2022)    Overall Financial Resource Strain (CARDIA)    • Difficulty of Paying Living Expenses: Not hard at all   Food Insecurity: Not on file   Transportation Needs: No Transportation Needs (2022)    PRAPARE - Transportation    • Lack of Transportation (Medical): No    • Lack of Transportation (Non-Medical):  No   Physical Activity: Not on file   Stress: Not on file   Social Connections: Not on file   Intimate Partner Violence: Not on file   Housing Stability: Not on file      Medications and Allergies:     Current Outpatient Medications Medication Sig Dispense Refill   • atorvastatin (LIPITOR) 10 mg tablet TAKE 1 TABLET(10 MG) BY MOUTH DAILY 90 tablet 0   • metoprolol tartrate (LOPRESSOR) 50 mg tablet TAKE 1 TABLET(50 MG) BY MOUTH EVERY 12 HOURS 180 tablet 3   • triamterene-hydrochlorothiazide (MAXZIDE-25) 37.5-25 mg per tablet TAKE 1 TABLET BY MOUTH DAILY 90 tablet 3     No current facility-administered medications for this visit. No Known Allergies   Immunizations:     Immunization History   Administered Date(s) Administered   • COVID-19 PFIZER VACCINE 0.3 ML IM 01/18/2021, 02/10/2021, 10/05/2021   • COVID-19 Pfizer Vac BIVALENT Brandt-sucrose 12 Yr+ IM 11/09/2022   • INFLUENZA 01/20/2012, 11/07/2012, 10/07/2017   • Influenza Split High Dose Preservative Free IM 10/28/2013, 10/23/2014, 09/02/2015, 09/26/2016, 10/07/2017   • Influenza, high dose seasonal 0.7 mL 10/06/2018, 10/09/2019, 09/03/2020, 10/16/2021, 10/08/2022, 10/09/2023   • Influenza, seasonal, injectable 01/01/2012   • Pneumococcal Conjugate 13-Valent 02/12/2015   • Pneumococcal Polysaccharide PPV23 08/30/2011, 01/01/2012   • Zoster 01/01/2013      Health Maintenance:         Topic Date Due   • Breast Cancer Screening: Mammogram  01/03/2023         Topic Date Due   • COVID-19 Vaccine (5 - Pfizer series) 03/09/2023      Medicare Screening Tests and Risk Assessments:     Roxanna Mercado is here for her Subsequent Wellness visit. Health Risk Assessment:   Patient rates overall health as good. Patient feels that their physical health rating is slightly better. Patient is very satisfied with their life. Eyesight was rated as same. Hearing was rated as same. Patient feels that their emotional and mental health rating is same. Patients states they are never, rarely angry. Patient states they are never, rarely unusually tired/fatigued. Pain experienced in the last 7 days has been none. Patient states that she has experienced no weight loss or gain in last 6 months.      Depression Screening: PHQ-2 Score: 0      Fall Risk Screening: In the past year, patient has experienced: no history of falling in past year      Urinary Incontinence Screening:   Patient has not leaked urine accidently in the last six months. Home Safety:  Patient does not have trouble with stairs inside or outside of their home. Patient has working smoke alarms and has working carbon monoxide detector. Home safety hazards include: none. Nutrition:   Current diet is Regular. Medications:   Patient is not currently taking any over-the-counter supplements. Patient is able to manage medications. Activities of Daily Living (ADLs)/Instrumental Activities of Daily Living (IADLs):   Walk and transfer into and out of bed and chair?: Yes  Dress and groom yourself?: Yes    Bathe or shower yourself?: Yes    Feed yourself? Yes  Do your laundry/housekeeping?: Yes  Manage your money, pay your bills and track your expenses?: Yes  Make your own meals?: Yes    Do your own shopping?: Yes    Previous Hospitalizations:   Any hospitalizations or ED visits within the last 12 months?: No      PREVENTIVE SCREENINGS      Cardiovascular Screening:    General: Screening Not Indicated and History Lipid Disorder      Diabetes Screening:     General: Screening Current      Colorectal Cancer Screening:     General: Screening Not Indicated      Breast Cancer Screening:     General: Screening Current      Cervical Cancer Screening:    General: Screening Not Indicated      Lung Cancer Screening:     General: Screening Not Indicated    No results found. Physical Exam:     /74 (BP Location: Right arm, Patient Position: Sitting, Cuff Size: Standard)   Pulse 62   Temp (!) 95.3 °F (35.2 °C) (Temporal)   Resp 15   Ht 5' 2.52" (1.588 m)   Wt 60.4 kg (133 lb 3.2 oz)   SpO2 94%   BMI 23.96 kg/m²     Physical Exam  Vitals and nursing note reviewed. Constitutional:       Appearance: Normal appearance. She is well-developed.    HENT:      Head: Normocephalic and atraumatic. Right Ear: External ear normal.      Left Ear: External ear normal.      Nose: Nose normal.   Eyes:      Extraocular Movements: Extraocular movements intact. Conjunctiva/sclera: Conjunctivae normal.      Pupils: Pupils are equal, round, and reactive to light. Cardiovascular:      Rate and Rhythm: Normal rate and regular rhythm. Heart sounds: Normal heart sounds. Pulmonary:      Effort: Pulmonary effort is normal.      Breath sounds: Normal breath sounds. Abdominal:      General: Abdomen is flat. Bowel sounds are normal.      Palpations: Abdomen is soft. Musculoskeletal:         General: Tenderness (left shoulder, good ROM) present. Normal range of motion. Cervical back: Normal range of motion and neck supple. Skin:     General: Skin is warm and dry. Capillary Refill: Capillary refill takes less than 2 seconds. Neurological:      General: No focal deficit present. Mental Status: She is alert and oriented to person, place, and time. Psychiatric:         Mood and Affect: Mood normal.         Behavior: Behavior normal.         Thought Content:  Thought content normal.         Judgment: Judgment normal.          George Acuña DO

## 2023-12-06 NOTE — PATIENT INSTRUCTIONS
Medicare Preventive Visit Patient Instructions  Thank you for completing your Welcome to Medicare Visit or Medicare Annual Wellness Visit today. Your next wellness visit will be due in one year (12/6/2024). The screening/preventive services that you may require over the next 5-10 years are detailed below. Some tests may not apply to you based off risk factors and/or age. Screening tests ordered at today's visit but not completed yet may show as past due. Also, please note that scanned in results may not display below. Preventive Screenings:  Service Recommendations Previous Testing/Comments   Colorectal Cancer Screening  * Colonoscopy    * Fecal Occult Blood Test (FOBT)/Fecal Immunochemical Test (FIT)  * Fecal DNA/Cologuard Test  * Flexible Sigmoidoscopy Age: 43-73 years old   Colonoscopy: every 10 years (may be performed more frequently if at higher risk)  OR  FOBT/FIT: every 1 year  OR  Cologuard: every 3 years  OR  Sigmoidoscopy: every 5 years  Screening may be recommended earlier than age 39 if at higher risk for colorectal cancer. Also, an individualized decision between you and your healthcare provider will decide whether screening between the ages of 77-80 would be appropriate. Colonoscopy: Not on file  FOBT/FIT: Not on file  Cologuard: Not on file  Sigmoidoscopy: Not on file    Screening Not Indicated     Breast Cancer Screening Age: 36 years old  Frequency: every 1-2 years  Not required if history of left and right mastectomy Mammogram: 01/03/2022    Screening Current   Cervical Cancer Screening Between the ages of 21-29, pap smear recommended once every 3 years. Between the ages of 32-69, can perform pap smear with HPV co-testing every 5 years.    Recommendations may differ for women with a history of total hysterectomy, cervical cancer, or abnormal pap smears in past. Pap Smear: Not on file    Screening Not Indicated   Hepatitis C Screening Once for adults born between 1945 and 1965  More frequently in patients at high risk for Hepatitis C Hep C Antibody: Not on file        Diabetes Screening 1-2 times per year if you're at risk for diabetes or have pre-diabetes Fasting glucose: No results in last 5 years (No results in last 5 years)  A1C: 6.3 % of total Hgb (6/6/2023)  Screening Current   Cholesterol Screening Once every 5 years if you don't have a lipid disorder. May order more often based on risk factors. Lipid panel: 06/06/2023    Screening Not Indicated  History Lipid Disorder     Other Preventive Screenings Covered by Medicare:  Abdominal Aortic Aneurysm (AAA) Screening: covered once if your at risk. You're considered to be at risk if you have a family history of AAA. Lung Cancer Screening: covers low dose CT scan once per year if you meet all of the following conditions: (1) Age 48-67; (2) No signs or symptoms of lung cancer; (3) Current smoker or have quit smoking within the last 15 years; (4) You have a tobacco smoking history of at least 20 pack years (packs per day multiplied by number of years you smoked); (5) You get a written order from a healthcare provider. Glaucoma Screening: covered annually if you're considered high risk: (1) You have diabetes OR (2) Family history of glaucoma OR (3)  aged 48 and older OR (3)  American aged 72 and older  Osteoporosis Screening: covered every 2 years if you meet one of the following conditions: (1) You're estrogen deficient and at risk for osteoporosis based off medical history and other findings; (2) Have a vertebral abnormality; (3) On glucocorticoid therapy for more than 3 months; (4) Have primary hyperparathyroidism; (5) On osteoporosis medications and need to assess response to drug therapy. Last bone density test (DXA Scan): 03/06/2015. HIV Screening: covered annually if you're between the age of 14-79. Also covered annually if you are younger than 13 and older than 72 with risk factors for HIV infection.  For pregnant patients, it is covered up to 3 times per pregnancy. Immunizations:  Immunization Recommendations   Influenza Vaccine Annual influenza vaccination during flu season is recommended for all persons aged >= 6 months who do not have contraindications   Pneumococcal Vaccine   * Pneumococcal conjugate vaccine = PCV13 (Prevnar 13), PCV15 (Vaxneuvance), PCV20 (Prevnar 20)  * Pneumococcal polysaccharide vaccine = PPSV23 (Pneumovax) Adults 01-31 yo with certain risk factors or if 69+ yo  If never received any pneumonia vaccine: recommend Prevnar 20 (PCV20)  Give PCV20 if previously received 1 dose of PCV13 or PPSV23   Hepatitis B Vaccine 3 dose series if at intermediate or high risk (ex: diabetes, end stage renal disease, liver disease)   Respiratory syncytial virus (RSV) Vaccine - COVERED BY MEDICARE PART D  * RSVPreF3 (Arexvy) CDC recommends that adults 61years of age and older may receive a single dose of RSV vaccine using shared clinical decision-making (SCDM)   Tetanus (Td) Vaccine - COST NOT COVERED BY MEDICARE PART B Following completion of primary series, a booster dose should be given every 10 years to maintain immunity against tetanus. Td may also be given as tetanus wound prophylaxis. Tdap Vaccine - COST NOT COVERED BY MEDICARE PART B Recommended at least once for all adults. For pregnant patients, recommended with each pregnancy. Shingles Vaccine (Shingrix) - COST NOT COVERED BY MEDICARE PART B  2 shot series recommended in those 19 years and older who have or will have weakened immune systems or those 50 years and older     Health Maintenance Due:      Topic Date Due   • Breast Cancer Screening: Mammogram  01/03/2023     Immunizations Due:      Topic Date Due   • COVID-19 Vaccine (5 - Pfizer series) 03/09/2023     Advance Directives   What are advance directives? Advance directives are legal documents that state your wishes and plans for medical care.  These plans are made ahead of time in case you lose your ability to make decisions for yourself. Advance directives can apply to any medical decision, such as the treatments you want, and if you want to donate organs. What are the types of advance directives? There are many types of advance directives, and each state has rules about how to use them. You may choose a combination of any of the following:  Living will: This is a written record of the treatment you want. You can also choose which treatments you do not want, which to limit, and which to stop at a certain time. This includes surgery, medicine, IV fluid, and tube feedings. Durable power of  for [facility]): This is a written record that states who you want to make healthcare choices for you when you are unable to make them for yourself. This person, called a proxy, is usually a family member or a friend. You may choose more than 1 proxy. Do not resuscitate (DNR) order:  A DNR order is used in case your heart stops beating or you stop breathing. It is a request not to have certain forms of treatment, such as CPR. A DNR order may be included in other types of advance directives. Medical directive: This covers the care that you want if you are in a coma, near death, or unable to make decisions for yourself. You can list the treatments you want for each condition. Treatment may include pain medicine, surgery, blood transfusions, dialysis, IV or tube feedings, and a ventilator (breathing machine). Values history: This document has questions about your views, beliefs, and how you feel and think about life. This information can help others choose the care that you would choose. Why are advance directives important? An advance directive helps you control your care. Although spoken wishes may be used, it is better to have your wishes written down. Spoken wishes can be misunderstood, or not followed. Treatments may be given even if you do not want them.  An advance directive may make it easier for your family to make difficult choices about your care. © Copyright [employer] 2018 Information is for End User's use only and may not be sold, redistributed or otherwise used for commercial purposes.  All illustrations and images included in CareNotes® are the copyrighted property of [employer] Inc. or [address] [place] [address]

## 2023-12-12 LAB
ALBUMIN SERPL-MCNC: 4.4 G/DL (ref 3.6–5.1)
ALBUMIN/GLOB SERPL: 1.6 (CALC) (ref 1–2.5)
ALP SERPL-CCNC: 89 U/L (ref 37–153)
ALT SERPL-CCNC: 17 U/L (ref 6–29)
AST SERPL-CCNC: 20 U/L (ref 10–35)
BILIRUB SERPL-MCNC: 0.7 MG/DL (ref 0.2–1.2)
BUN SERPL-MCNC: 39 MG/DL (ref 7–25)
BUN/CREAT SERPL: 24 (CALC) (ref 6–22)
CALCIUM SERPL-MCNC: 10.1 MG/DL (ref 8.6–10.4)
CHLORIDE SERPL-SCNC: 97 MMOL/L (ref 98–110)
CHOLEST SERPL-MCNC: 147 MG/DL
CHOLEST/HDLC SERPL: 2.4 (CALC)
CO2 SERPL-SCNC: 31 MMOL/L (ref 20–32)
CREAT SERPL-MCNC: 1.61 MG/DL (ref 0.6–0.95)
ERYTHROCYTE [DISTWIDTH] IN BLOOD BY AUTOMATED COUNT: 12.5 % (ref 11–15)
EST. AVERAGE GLUCOSE BLD GHB EST-MCNC: 134 MG/DL
EST. AVERAGE GLUCOSE BLD GHB EST-SCNC: 7.4 MMOL/L
GFR/BSA.PRED SERPLBLD CYS-BASED-ARV: 31 ML/MIN/1.73M2
GLOBULIN SER CALC-MCNC: 2.7 G/DL (CALC) (ref 1.9–3.7)
GLUCOSE SERPL-MCNC: 215 MG/DL (ref 65–99)
HBA1C MFR BLD: 6.3 % OF TOTAL HGB
HCT VFR BLD AUTO: 43.6 % (ref 35–45)
HDLC SERPL-MCNC: 62 MG/DL
HGB BLD-MCNC: 14.8 G/DL (ref 11.7–15.5)
LDLC SERPL CALC-MCNC: 67 MG/DL (CALC)
MCH RBC QN AUTO: 32.5 PG (ref 27–33)
MCHC RBC AUTO-ENTMCNC: 33.9 G/DL (ref 32–36)
MCV RBC AUTO: 95.6 FL (ref 80–100)
NONHDLC SERPL-MCNC: 85 MG/DL (CALC)
PLATELET # BLD AUTO: 211 THOUSAND/UL (ref 140–400)
PMV BLD REES-ECKER: 9.9 FL (ref 7.5–12.5)
POTASSIUM SERPL-SCNC: 4.1 MMOL/L (ref 3.5–5.3)
PROT SERPL-MCNC: 7.1 G/DL (ref 6.1–8.1)
RBC # BLD AUTO: 4.56 MILLION/UL (ref 3.8–5.1)
SODIUM SERPL-SCNC: 137 MMOL/L (ref 135–146)
T4 FREE SERPL-MCNC: 1 NG/DL (ref 0.8–1.8)
TRIGL SERPL-MCNC: 93 MG/DL
TSH SERPL-ACNC: 4.66 MIU/L (ref 0.4–4.5)
WBC # BLD AUTO: 5.2 THOUSAND/UL (ref 3.8–10.8)

## 2023-12-14 ENCOUNTER — OFFICE VISIT (OUTPATIENT)
Dept: OBGYN CLINIC | Facility: OTHER | Age: 88
End: 2023-12-14
Payer: MEDICARE

## 2023-12-14 ENCOUNTER — APPOINTMENT (OUTPATIENT)
Dept: RADIOLOGY | Facility: OTHER | Age: 88
End: 2023-12-14
Payer: MEDICARE

## 2023-12-14 VITALS
BODY MASS INDEX: 23.39 KG/M2 | DIASTOLIC BLOOD PRESSURE: 65 MMHG | HEIGHT: 63 IN | HEART RATE: 79 BPM | SYSTOLIC BLOOD PRESSURE: 94 MMHG | WEIGHT: 132 LBS

## 2023-12-14 DIAGNOSIS — M25.512 ACUTE PAIN OF LEFT SHOULDER: ICD-10-CM

## 2023-12-14 DIAGNOSIS — M54.12 CERVICAL RADICULOPATHY: ICD-10-CM

## 2023-12-14 DIAGNOSIS — M75.82 ROTATOR CUFF TENDONITIS, LEFT: Primary | ICD-10-CM

## 2023-12-14 DIAGNOSIS — M75.52 SUBACROMIAL BURSITIS OF LEFT SHOULDER JOINT: ICD-10-CM

## 2023-12-14 PROCEDURE — 99204 OFFICE O/P NEW MOD 45 MIN: CPT | Performed by: ORTHOPAEDIC SURGERY

## 2023-12-14 PROCEDURE — 20610 DRAIN/INJ JOINT/BURSA W/O US: CPT | Performed by: ORTHOPAEDIC SURGERY

## 2023-12-14 PROCEDURE — 73030 X-RAY EXAM OF SHOULDER: CPT

## 2023-12-14 RX ORDER — BETAMETHASONE SODIUM PHOSPHATE AND BETAMETHASONE ACETATE 3; 3 MG/ML; MG/ML
6 INJECTION, SUSPENSION INTRA-ARTICULAR; INTRALESIONAL; INTRAMUSCULAR; SOFT TISSUE
Status: COMPLETED | OUTPATIENT
Start: 2023-12-14 | End: 2023-12-14

## 2023-12-14 RX ORDER — BUPIVACAINE HYDROCHLORIDE 2.5 MG/ML
2 INJECTION, SOLUTION INFILTRATION; PERINEURAL
Status: COMPLETED | OUTPATIENT
Start: 2023-12-14 | End: 2023-12-14

## 2023-12-14 RX ADMIN — BETAMETHASONE SODIUM PHOSPHATE AND BETAMETHASONE ACETATE 6 MG: 3; 3 INJECTION, SUSPENSION INTRA-ARTICULAR; INTRALESIONAL; INTRAMUSCULAR; SOFT TISSUE at 08:00

## 2023-12-14 RX ADMIN — BUPIVACAINE HYDROCHLORIDE 2 ML: 2.5 INJECTION, SOLUTION INFILTRATION; PERINEURAL at 08:00

## 2023-12-14 NOTE — PROGRESS NOTES
Assessment  Diagnoses and all orders for this visit:    Rotator cuff tendonitis, left    Subacromial bursitis of left shoulder joint    Cervical radiculopathy      Discussion and Plan:    The patient has an examination consistent with subacromial impingement syndrome of the left shoulder with some underlying cervical involvement. I have discussed with the patient the pathophysiology of this diagnosis and reviewed how the examination correlates with this diagnosis. Treatment options were discussed at length and after discussing these treatment options, the patient elected for and received a subacromial injection of corticosteroid (as described in the procedure note) with a prescription for referral to physical therapy. Follow up if this fails to provide relief. We can consider further imaging in the form of an MRI scan of the shoulder. If neck pain continues patient should follow up with spine and pain or her PCP    Subjective:   Patient ID: Javy Morataya is a 81 y.o. female      HPI  The patient presents with a chief complaint of left shoulder pain. The pain began 1 month(s) ago and is not associated with an acute injury. The patient describes the pain as aching and dull in intensity,  intermittent in timing, and localizes the pain to the  left anterolateral shoulder, upper trap and left sided cervical.  The pain is worse with movement and relieved by rest.  The pain is not associated with numbness and tingling. The pain is not associated with constitutional symptoms. The patient is awoken at night by the pain. The patient was last seen 2/19/18 for right shoulder impingement as was provided with a CS injection at that time.           The following portions of the patient's history were reviewed and updated as appropriate: allergies, current medications, past family history, past medical history, past social history, past surgical history and problem list.        Objective:  BP 94/65 (BP Location: Left arm, Patient Position: Sitting, Cuff Size: Standard)   Pulse 79   Ht 5' 2.5" (1.588 m)   Wt 59.9 kg (132 lb)   BMI 23.76 kg/m²       Left Shoulder Exam     Tenderness   The patient is experiencing no tenderness. Range of Motion   External rotation:  80   Forward flexion:  170   Internal rotation 0 degrees:  Lumbar     Muscle Strength   Abduction: 4/5   External rotation: 5/5     Tests   Guzmán test: positive  Impingement: positive    Other   Erythema: absent  Sensation: normal  Pulse: present             Physical Exam  Vitals reviewed. Constitutional:       Appearance: She is well-developed. Eyes:      Pupils: Pupils are equal, round, and reactive to light. Pulmonary:      Effort: Pulmonary effort is normal.      Breath sounds: Normal breath sounds. Abdominal:      General: Abdomen is flat. There is no distension. Skin:     General: Skin is warm and dry. Neurological:      Mental Status: She is alert and oriented to person, place, and time. Psychiatric:         Behavior: Behavior normal.         Thought Content: Thought content normal.         Judgment: Judgment normal.       Large joint arthrocentesis: L subacromial bursa  Universal Protocol:  Consent: Verbal consent obtained. Consent given by: patient  Patient understanding: patient states understanding of the procedure being performed  Site marked: the operative site was marked  Supporting Documentation  Indications: pain   Procedure Details  Location: shoulder - L subacromial bursa  Needle size: 22 G  Ultrasound guidance: no  Approach: lateral  Medications administered: 2 mL bupivacaine 0.25 %; 6 mg betamethasone acetate-betamethasone sodium phosphate 6 (3-3) mg/mL    Patient tolerance: patient tolerated the procedure well with no immediate complications  Dressing:  Sterile dressing applied            I have personally reviewed pertinent films in PACS and my interpretation is as follows.   Left shoulder x-rays demonstrates no fracture or dislocation, mild degenerative changes.      Scribe Attestation      I,:  Hitesh Barriga am acting as a scribe while in the presence of the attending physician.:       I,:  Jag Denny MD personally performed the services described in this documentation    as scribed in my presence.:

## 2023-12-14 NOTE — PATIENT INSTRUCTIONS
CORTICOSTEROID INJECTION  What is a corticosteroid? Injuries or disease such as arthritis, bursitis or tendonitis result in inflammation. In turn, this inflammation can cause swelling and pain. A local injection of a corticosteroid is provided to diminish inflammation. By doing so, it will also decrease pain and swelling which is making you uncomfortable. Is this the same thing as a Cortisone Injection? Cortisone® is a brand name of a corticosteroid used commonly in the past.  Today I commonly use a more water-soluble corticosteroid named Celestone®. Will the injection hurt? As with any injection, you may feel pain at the time of the injection. Typically, I use a local anesthetic (Sherlynn Hint) in addition to the corticosteroid to determine if the injection has been placed in the appropriate location. Hence it is important to monitor your symptoms 4-6 hours after the injection, as the area will be anesthetized (numb) while the local anesthetic is working. Once the local anesthetic wears off, the intensity of pain can be the same as it was prior to the injection, or even worse. This does not mean that the injection is not working. The corticosteroid may take 24-72 hours to begin having a positive effect. If you do experience an increase in pain, the use of an ice pack on the area for 20 minutes at a time should help. It is also helpful to take an oral anti-inflammatory such as Tylenol® or Motrin® if you are able to medically do so. For this reason it is best to avoid activities that put stress on the area the first 24 hours after the injection. How long will pain relief last?  This will vary according to the type and severity of the symptoms being treated and the severity of the condition. Symptom relief may last weeks to months. I typically couple injections with physical therapy so that the underlying problem causing the inflammation may be treated as the pain diminishes.   If the combination is not successful, you may be a surgical candidate. I have read bad things about steroids. Will these things happen to me? Corticosteroids, when utilized properly, are safe and effective drugs. When used in a low dose, potential adverse reactions are very rare. Some patients may experience a sensation of flushing for several days. Very rarely, there can be a local reaction which may include increased discomfort for a period of time in the areas that has been injected. A steroid should not be used over and over again. Multiple injections in the same area can produce adverse effects such as tissue atrophy and degeneration of tendon or cartilage. A small percentage of patients (< 0.1%) may develop an infection in the joint after injection. This is a treatable problem, but if neglected, may result in permanent disability. Signs of infection include redness, swelling, discharge, fevers, increasing pain and drainage from the injection site. This represents an emergency and you should contact our office immediately or seek treatment in the ER if after hours. If I have diabetes, will this injection affect me? If you are diabetic, an injection of a corticosteroid can raise your blood sugar level, requiring more insulin for a brief period of time. This may necessitate careful blood sugar maintenance. If the elevated sugars are not able to be controlled, contact your diabetic doctor for guidance.

## 2023-12-18 ENCOUNTER — EVALUATION (OUTPATIENT)
Dept: PHYSICAL THERAPY | Facility: OTHER | Age: 88
End: 2023-12-18
Payer: MEDICARE

## 2023-12-18 DIAGNOSIS — M75.52 SUBACROMIAL BURSITIS OF LEFT SHOULDER JOINT: ICD-10-CM

## 2023-12-18 DIAGNOSIS — M25.512 LEFT SHOULDER PAIN, UNSPECIFIED CHRONICITY: Primary | ICD-10-CM

## 2023-12-18 DIAGNOSIS — M54.12 CERVICAL RADICULOPATHY: ICD-10-CM

## 2023-12-18 DIAGNOSIS — M75.82 ROTATOR CUFF TENDONITIS, LEFT: ICD-10-CM

## 2023-12-18 PROCEDURE — 97110 THERAPEUTIC EXERCISES: CPT

## 2023-12-18 PROCEDURE — 97161 PT EVAL LOW COMPLEX 20 MIN: CPT

## 2023-12-18 NOTE — PROGRESS NOTES
PT Evaluation     Today's date: 2023  Patient name: Damaris Truong  : 1935  MRN: [id number]  Referring provider: Felipe Yoo*  Dx:   Encounter Diagnosis     ICD-10-CM    1. Left shoulder pain, unspecified chronicity  M25.512       2. Rotator cuff tendonitis, left  M75.82 Ambulatory Referral to Physical Therapy      3. Cervical radiculopathy  M54.12 Ambulatory Referral to Physical Therapy      4. Subacromial bursitis of left shoulder joint  M75.52 Ambulatory Referral to Physical Therapy          Start Time: 06  Stop Time: 0750  Total time in clinic (min): 55 minutes    Assessment  Assessment details: Problem List:  1) Thoracic hypomobility  2) General left RTC/Scapular motor control deficits/weakness    Damaris Truong is a pleasant 88 y.o. 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 She has thoracic hypomobility and general left rotator cuff/scapular motor control deficits/weakness, nociceptive pain, and diagnosis of rotator cuff tendonitis and cervical radiculopathy resulting in the pain she is experiencing, worry over not knowing what's wrong, fear of not being able to keep active, and future ill health (and wanting to prevent it).  No further referral appears necessary at this time based upon examination results.  I expect she will improve in 8 weeks.  Positive prognostic indicators include positive attitude toward recovery, good understanding of diagnosis and treatment plan options, acuity of symptoms, absence of peripheralization, and absence of observed red flags.  Negative prognostic indicators include hypertension. Patient was provided with a customized home exercise program to begin performing on their own. All patient questions and concerns have been addressed at this time. Thank you for the kind referral.    Comparable signs:  1) Pain with reaching overhead into shelves  2) Pain with doing household  chores  Impairments: abnormal coordination, abnormal muscle firing, abnormal muscle tone, abnormal or restricted ROM, abnormal movement, activity intolerance, impaired physical strength, lacks appropriate home exercise program, pain with function, weight-bearing intolerance, poor posture  and poor body mechanics    Symptom irritability: lowUnderstanding of Dx/Px/POC: good   Prognosis: good    Goals  Short Term Goals:   1. Patient will be independent with a customized HEP.  2. Patient will report at least 40% improvement overall with performance of ADL's/[employer] volunteer activities.  3. Patient will demonstrate half grade improvement or better of left shoulder ER/IR.  4. Patient will demonstrate half grade improvement of left shoulder flexion/abduction.     Long Term Goals:   1. Patient will improve FOTO to greater than goal of 68.  2. Patient will improve pain with activity to 1/10 or less.  3. Patient will continue with HEP independence to allow for decreased future reoccurrence of pain and loss in function.  4. Patient will report at least 80% improvement overall with ADL's/[employer] volunteer activities.    Plan  Plan details: Prognosis above is given PT services 2x/week tapering to 1x/week over the next 3 months and home program adherence.  Patient would benefit from: PT eval and skilled physical therapy  Planned modality interventions: cryotherapy, TENS, thermotherapy: hydrocollator packs and low level laser therapy  Planned therapy interventions: manual therapy, massage, joint mobilization, coordination, graded exercise, graded activity, functional ROM exercises, therapeutic exercise, therapeutic activities, patient education, neuromuscular re-education, home exercise program and flexibility  Frequency: 2x week  Duration in visits: 20  Duration in weeks: 10  Plan of Care beginning date: 12/18/2023  Plan of Care expiration date: 2/26/2024  Treatment plan discussed with: patient and family        Subjective  "Evaluation    History of Present Illness  Mechanism of injury: Patient is a 88 y.o. female presenting to physical therapy with family member with chief complaint of left shoulder pain. Patient reports this episode of pain/symptoms has been going on for the past month, noticeably after volunteering at [employer] over Thanksgiving doing a lot of cooking/baking. Patient denies and unexplained numbness/tingling. Patient reports she experienced mild relief with CSIx1 for her left shoulder.   Quality of life: fair    Patient Goals  Patient goals for therapy: independence with ADLs/IADLs, decreased pain, increased strength, return to sport/leisure activities and increased motion  Patient goal: \"Get back to doing my household chores and volunteer activities at [employer] without pain\"  Pain  Current pain ratin  At best pain ratin  At worst pain rating: 3  Location: \"Points to top of left shoulder and A/C joint\"  Quality: dull ache (\"Heavy\")  Relieving factors: rest  Aggravating factors: overhead activity and lifting    Hand dominance: right      Diagnostic Tests  X-ray: normal  Treatments  Previous treatment: injection treatment        Objective    Posture: Mild forward head and rounded shoulders  Palpation: TTP supraspinatus   Myotomes (L/R): All intact B/L UE  Dermatome: (pinprick- L/R): All intact B/L UE  Reflexes:  (L/R) C5-6: 2+ B/L      Harding: (-) B/L                     Cervical  % of normal   Flex. 100   Extn. 100   SB Left 50   SB Right 50   ROT Left 75   ROT Right 75         MMT         AROM          PROM    Shoulder       L       R        L           R      L     R   Flex. 3+ P 4 WFL P WFL WFL WFL   Abd. 3+ P 4 WFL P WFL WFL WFL   IR. 3+ 4 WFL WFL WFL WFL   ER. 3+ P 4 WFL WFL WFL WFL            Rhomboid         Mid Trap         Low Trap         Serratus         Infraspnatus         Teres Major         Sub Scap             Rotator Cuff Testing:  ER Lag: negative   Drop Arm: negative     Impingement Testing: "   Nereida: positive  Neers: positive  Arc Sign: positive    Segmental mobility: TS: hypomobility         Precautions: Mild hearing impairment, HTN.    Access Code: [email or web address]  URL: [email or web address]  Date: 12/18/2023  Prepared by: Hans Menendez    Exercises  - Standing Shoulder Row with Anchored Resistance  - 1 x daily - 3-4 x weekly - 3 sets - 10 reps  - Shoulder extension with resistance - Neutral  - 1 x daily - 3-4 x weekly - 3 sets - 10 reps  - Shoulder Internal Rotation with Resistance  - 1 x daily - 3-4 x weekly - 3 sets - 10 reps  - Shoulder External Rotation with Anchored Resistance  - 1 x daily - 3-4 x weekly - 3 sets - 10 reps    POC expires Unit limit Auth Expiration date PT/OT + Visit Limit?   2/26/24 N/A N/A BOMN                               Visit 1 IE        Manuals 12/18/23        Shoulder PROM         Soft Tissue Deformation                           Neuro Re-Ed         Scap Squeeze         Row 10x HEP        LPD 10x HEP        ER 10x HEP        IR 10x HEP        Full can                                    Ther Ex         UBE         Pulleys                                                               Ther Activity                           Gait Training                           Modalities

## 2023-12-22 ENCOUNTER — OFFICE VISIT (OUTPATIENT)
Dept: PHYSICAL THERAPY | Facility: OTHER | Age: 88
End: 2023-12-22
Payer: MEDICARE

## 2023-12-22 DIAGNOSIS — M75.82 ROTATOR CUFF TENDONITIS, LEFT: Primary | ICD-10-CM

## 2023-12-22 DIAGNOSIS — M75.52 SUBACROMIAL BURSITIS OF LEFT SHOULDER JOINT: ICD-10-CM

## 2023-12-22 DIAGNOSIS — M54.12 CERVICAL RADICULOPATHY: ICD-10-CM

## 2023-12-22 DIAGNOSIS — M25.512 LEFT SHOULDER PAIN, UNSPECIFIED CHRONICITY: ICD-10-CM

## 2023-12-22 PROCEDURE — 97110 THERAPEUTIC EXERCISES: CPT

## 2023-12-22 PROCEDURE — 97112 NEUROMUSCULAR REEDUCATION: CPT

## 2023-12-22 NOTE — PROGRESS NOTES
"Daily Note     Today's date: 2023  Patient name: Damaris Truong  : 1935  MRN: [id number]  Referring provider: Felipe Yoo*  Dx:   Encounter Diagnosis     ICD-10-CM    1. Rotator cuff tendonitis, left  M75.82       2. Cervical radiculopathy  M54.12       3. Subacromial bursitis of left shoulder joint  M75.52       4. Left shoulder pain, unspecified chronicity  M25.512           Start Time: 740  Stop Time: 812  Total time in clinic (min): 32 minutes    Subjective: Patient reports her shoulder has been doing well and that her exercises are not leaving her too sore or painful. Patient reports she had some pain over her A/C joint that resolved over the past couple days.       Objective: See treatment diary below      Assessment: Tolerated treatment well with focus on thoracic mobility and scapular/rotator cuff activation/strengthening. No adverse reactions to treatment. Patient demonstrated fatigue post treatment, exhibited good technique with therapeutic exercises, and would benefit from continued PT.       Plan: Continue per plan of care.      Precautions: Mild hearing impairment, HTN.    Access Code: [email or web address]    POC expires Unit limit Auth Expiration date PT/OT + Visit Limit?   24 N/A N/A BOMN                               Visit 1 IE 2       Manuals 23       Shoulder PROM         Soft Tissue Deformation                           Neuro Re-Ed         Scap Squeeze         Row 10x HEP Green Tube 3x10       LPD 10x HEP Rush Tube 3x10       ER 10x HEP Peach Tube 3x10       IR 10x HEP Orange Tube 3x10       Full can  1# 3x10                                  Ther Ex         UBE         Pulleys  5'        Seated thoracic ext stretch  20x5\"       Towel slide  3x10                                           Ther Activity                           Gait Training                           Modalities                                     "

## 2023-12-26 ENCOUNTER — APPOINTMENT (OUTPATIENT)
Dept: PHYSICAL THERAPY | Facility: OTHER | Age: 88
End: 2023-12-26
Payer: MEDICARE

## 2023-12-28 ENCOUNTER — HOSPITAL ENCOUNTER (EMERGENCY)
Facility: HOSPITAL | Age: 88
Discharge: HOME | End: 2023-12-28
Attending: EMERGENCY MEDICINE
Payer: MEDICARE

## 2023-12-28 ENCOUNTER — APPOINTMENT (EMERGENCY)
Dept: RADIOLOGY | Facility: HOSPITAL | Age: 88
End: 2023-12-28
Payer: MEDICARE

## 2023-12-28 VITALS
OXYGEN SATURATION: 97 % | DIASTOLIC BLOOD PRESSURE: 66 MMHG | HEART RATE: 69 BPM | TEMPERATURE: 97.8 F | RESPIRATION RATE: 18 BRPM | SYSTOLIC BLOOD PRESSURE: 94 MMHG

## 2023-12-28 DIAGNOSIS — M25.552 LEFT HIP PAIN: Primary | ICD-10-CM

## 2023-12-28 DIAGNOSIS — M54.50 LEFT LOW BACK PAIN: ICD-10-CM

## 2023-12-28 PROCEDURE — 99284 EMERGENCY DEPT VISIT MOD MDM: CPT | Performed by: EMERGENCY MEDICINE

## 2023-12-28 PROCEDURE — 73502 X-RAY EXAM HIP UNI 2-3 VIEWS: CPT

## 2023-12-28 PROCEDURE — 99283 EMERGENCY DEPT VISIT LOW MDM: CPT

## 2023-12-28 RX ORDER — LIDOCAINE 50 MG/G
1 PATCH TOPICAL ONCE
Status: DISCONTINUED | OUTPATIENT
Start: 2023-12-28 | End: 2023-12-28 | Stop reason: HOSPADM

## 2023-12-28 RX ORDER — ACETAMINOPHEN 325 MG/1
975 TABLET ORAL ONCE
Status: COMPLETED | OUTPATIENT
Start: 2023-12-28 | End: 2023-12-28

## 2023-12-28 RX ADMIN — ACETAMINOPHEN 975 MG: 325 TABLET, FILM COATED ORAL at 16:45

## 2023-12-28 RX ADMIN — LIDOCAINE 5% 1 PATCH: 700 PATCH TOPICAL at 16:45

## 2023-12-28 NOTE — ED PROVIDER NOTES
History  Chief Complaint   Patient presents with    Leg Pain     Pt reports L leg pain for approx 1 week. Was seen at [facility] and was told to rest and see physical therapy. Patient reports it is no better and has difficulty ambulating, uses cane. Denies injury     88-year-old woman with no relevant past medical history presents with left lower back and hip pain.  Patient reports this is progressively worsened over the last week.  She uses a cane due to the pain and concerns for falls.  She has not had any falls however though.  She has tried Tylenol with some improvement of her symptoms. Patient denies fever, IV drug use, history of cancer, unexplained weight loss, back pain worsening at night, recent major trauma to the back, bowel or bladder incontinence, or lower extremity weakness or numbness. Patient is not currently taking any medications that would significantly compromise their immune system. Patient reports a history of decreased sensation of her left leg compared to the right but is unsure when this started.        Prior to Admission Medications   Prescriptions Last Dose Informant Patient Reported? Taking?   atorvastatin (LIPITOR) 10 mg tablet   No No   Sig: TAKE 1 TABLET(10 MG) BY MOUTH DAILY   metoprolol tartrate (LOPRESSOR) 50 mg tablet  Self No No   Sig: TAKE 1 TABLET(50 MG) BY MOUTH EVERY 12 HOURS   triamterene-hydrochlorothiazide (MAXZIDE-25) 37.5-25 mg per tablet  Self No No   Sig: TAKE 1 TABLET BY MOUTH DAILY      Facility-Administered Medications: None       Past Medical History:   Diagnosis Date    Aorta aneurysm (HCC)     Basal cell carcinoma 06/14/2023    Left nasal tip    Cardiac disease     Fall 05/23/2019    GERD (gastroesophageal reflux disease)     Hematuria     last assessed: 10/28/2013    Hip pain, acute, left 05/23/2019    Shortness of breath     last assessed: 6/27/2013       Past Surgical History:   Procedure Laterality Date    ADENOIDECTOMY      APPENDECTOMY      BREAST BIOPSY Left      BREAST LUMPECTOMY Left     CATARACT EXTRACTION      CERVICAL FUSION      HYSTERECTOMY      age 51    MOHS SURGERY Left 2023    BCC- Left nasal tip    OOPHORECTOMY      OTHER SURGICAL HISTORY      paravaginal defect graft-reinforced repair    REPAIR RECTOCELE      TONSILLECTOMY      VEIN LIGATION AND STRIPPING Bilateral        Family History   Problem Relation Age of Onset    Diabetes Mother         mellitus    Glaucoma Mother     Hypertension Mother     Macular degeneration Mother     Stroke Father         syndrome    Hypertension Father     Cancer Sister     Dementia Sister     Heart disease Sister     No Known Problems Daughter     No Known Problems Daughter     No Known Problems Sister     No Known Problems Sister     No Known Problems Sister     No Known Problems Sister     Breast cancer Sister 40    No Known Problems Maternal Grandmother     No Known Problems Maternal Grandfather     No Known Problems Paternal Grandmother     No Known Problems Paternal Grandfather     No Known Problems Sister     Cancer Maternal Aunt     Cancer Maternal Aunt     Cancer Maternal Aunt     No Known Problems Maternal Aunt     No Known Problems Maternal Aunt     No Known Problems Maternal Aunt     No Known Problems Paternal Aunt     No Known Problems Paternal Aunt     No Known Problems Paternal Aunt     No Known Problems Paternal Aunt     No Known Problems Paternal Aunt      I have reviewed and agree with the history as documented.    E-Cigarette/Vaping    E-Cigarette Use Never User      E-Cigarette/Vaping Substances    Nicotine No     THC No     CBD No     Flavoring No     Other No     Unknown No      Social History     Tobacco Use    Smoking status: Former     Current packs/day: 0.00     Types: Cigarettes     Quit date:      Years since quittin.0    Smokeless tobacco: Never   Vaping Use    Vaping status: Never Used   Substance Use Topics    Alcohol use: No    Drug use: No        Review of Systems    Physical  Exam  ED Triage Vitals   Temperature Pulse Respirations Blood Pressure SpO2   12/28/23 1539 12/28/23 1539 12/28/23 1539 12/28/23 1539 12/28/23 1539   97.8 °F (36.6 °C) 69 18 94/66 97 %      Temp Source Heart Rate Source Patient Position - Orthostatic VS BP Location FiO2 (%)   12/28/23 1539 12/28/23 1539 12/28/23 1539 12/28/23 1539 --   Oral Monitor Sitting Left arm       Pain Score       12/28/23 1645       8             Orthostatic Vital Signs  Vitals:    12/28/23 1539   BP: 94/66   Pulse: 69   Patient Position - Orthostatic VS: Sitting       Physical Exam  Vitals and nursing note reviewed.   Constitutional:       General: She is not in acute distress.     Appearance: Normal appearance. She is well-developed.   HENT:      Head: Normocephalic and atraumatic.      Right Ear: External ear normal.      Left Ear: External ear normal.   Cardiovascular:      Rate and Rhythm: Normal rate.   Pulmonary:      Effort: Pulmonary effort is normal. No respiratory distress.   Musculoskeletal:         General: Normal range of motion.      Cervical back: Normal range of motion and neck supple.      Comments: Full ROM of the bilateral lower extremities without tenderness.  Some pain with palpation of the left lower back.   Skin:     General: Skin is warm and dry.   Neurological:      Mental Status: She is alert and oriented to person, place, and time. Mental status is at baseline.      Comments: 5 out of 5 strength of the lower extremity muscle groups bilaterally. Slightly decreased sensation of the left lower leg compared to right.   Psychiatric:         Mood and Affect: Mood normal.         Behavior: Behavior normal.         ED Medications  Medications   lidocaine (LIDODERM) 5 % patch 1 patch (1 patch Topical Medication Applied 12/28/23 1645)   acetaminophen (TYLENOL) tablet 975 mg (975 mg Oral Given 12/28/23 1645)       Diagnostic Studies  Results Reviewed       None                   XR hip/pelv 2-3 vws left if performed   ED  "Interpretation by Pranay Montes MD (12/28 1742)   Radiograph personally interpreted by me as no acute osseous abnormality. Left hip osteoarthritis. Metallic objects noted of the bilateral groin.            Procedures  Procedures      ED Course                                       Medical Decision Making  Presents with left lower back pain.  Patient does have some pain with palpation of the left lower back and left hip.  Full ROM of the lower extremities.  No neurologic deficits appreciated of the lower extremities although she does report some subjective decrease sensation of the entire left leg compared to the right.  Will obtain radiograph of left hip to evaluate for occult fracture.    Radiograph shows osteoarthritis but no fractures.  Patient given acetaminophen and lidocaine patch with some improvement of her pain.  Will recommend she follow-up with orthopedics for possible symptomatic arthritis.  She may have a component of sciatica as well, and I recommend she consider physical therapy and stretching at home. Patient in agreement with plan and questions were answered. Verbalized understanding of return precautions.    Portions or all of this note were generated using voice recognition software.  Occasional wrong word or \"sound a like\" substitutions may have occurred due to the inherent limitations of voice recognition software.  Please interpret any errors within the intended context of the whole sentence or idea.      Amount and/or Complexity of Data Reviewed  Radiology: ordered and independent interpretation performed.    Risk  OTC drugs.  Prescription drug management.          Disposition  Final diagnoses:   Left hip pain   Left low back pain     Time reflects when diagnosis was documented in both MDM as applicable and the Disposition within this note       Time User Action Codes Description Comment    12/28/2023  5:48 PM Pranay Montes Add [M25.552] Left hip pain     12/28/2023  5:48 PM " Pranay Montes Add [M54.50] Left low back pain           ED Disposition       ED Disposition   Discharge    Condition   Stable    Date/Time   Thu Dec 28, 2023  5:48 PM    Comment   Damaris Truong discharge to home/self care.                   Follow-up Information       Follow up With Specialties Details Why Contact Info Additional Information    [facility] Schedule an appointment as soon as possible for a visit in 3 days  [address] [address] [phone] [facility], [facility] [address] [address], [place], [place], [address] [phone]  Use Entrance A             Discharge Medication List as of 12/28/2023  5:49 PM        CONTINUE these medications which have NOT CHANGED    Details   atorvastatin (LIPITOR) 10 mg tablet TAKE 1 TABLET(10 MG) BY MOUTH DAILY, Normal      metoprolol tartrate (LOPRESSOR) 50 mg tablet TAKE 1 TABLET(50 MG) BY MOUTH EVERY 12 HOURS, Normal      triamterene-hydrochlorothiazide (MAXZIDE-25) 37.5-25 mg per tablet TAKE 1 TABLET BY MOUTH DAILY, Starting Tue 1/31/2023, Normal               PDMP Review       None             ED Provider  Attending physically available and evaluated Damaris Truong. I managed the patient along with the ED Attending.    Electronically Signed by           Pranay Montes MD  12/28/23 5507

## 2023-12-28 NOTE — DISCHARGE INSTRUCTIONS
You were seen for left hip and left lower back pain. We found no emergent causes for your symptoms. Your symptoms may be from arthritis but could also be some sciatica. You can take 975 mg acetaminophen (brand name includes Tylenol) every 6 hours for pain/fever. You can follow up with the orthopedics office below.

## 2023-12-29 ENCOUNTER — APPOINTMENT (OUTPATIENT)
Dept: PHYSICAL THERAPY | Facility: OTHER | Age: 88
End: 2023-12-29
Payer: MEDICARE

## 2023-12-30 NOTE — ED ATTENDING ATTESTATION
12/28/2023  I, Wilfredo Hilton MD, saw and evaluated the patient. I have discussed the patient with the resident/non-physician practitioner and agree with the resident's/non-physician practitioner's findings, Plan of Care, and MDM as documented in the resident's/non-physician practitioner's note, except where noted. All available labs and Radiology studies were reviewed.  I was present for key portions of any procedure(s) performed by the resident/non-physician practitioner and I was immediately available to provide assistance.       At this point I agree with the current assessment done in the Emergency Department.  I have conducted an independent evaluation of this patient a history and physical is as follows:    ED Course       88-year-old female presenting with left leg and hip pain for past week.  Denies any focal weakness numbness.  Pain worse with bending or pressure.  Denies any back pain.    Vitals reviewed.  Neuro exam nonfocal.  Patient has pain over the lateral aspect of hip and with external and internal rotation.    Impression left hip and buttock pain.  Differential diagnosis: DJD, occult hip fracture avascular necrosis, doubt septic arthritis, doubt lumbar radiculitis,    Plan to treat patient's pain obtain hip x-rays anticipate discharge with outpatient orthopedics follow-up.      Critical Care Time  Procedures

## 2024-01-01 ENCOUNTER — HOME CARE VISIT (OUTPATIENT)
Dept: HOME HEALTH SERVICES | Facility: HOME HEALTHCARE | Age: 89
End: 2024-01-01
Payer: MEDICARE

## 2024-01-01 ENCOUNTER — HOME CARE VISIT (OUTPATIENT)
Dept: HOME HOSPICE | Facility: HOSPICE | Age: 89
End: 2024-01-01
Payer: MEDICARE

## 2024-01-01 ENCOUNTER — HOSPICE ADMISSION (OUTPATIENT)
Dept: HOME HOSPICE | Facility: HOSPICE | Age: 89
End: 2024-01-01
Payer: MEDICARE

## 2024-01-01 VITALS
RESPIRATION RATE: 16 BRPM | DIASTOLIC BLOOD PRESSURE: 90 MMHG | HEART RATE: 105 BPM | SYSTOLIC BLOOD PRESSURE: 100 MMHG | OXYGEN SATURATION: 96 % | TEMPERATURE: 97.8 F

## 2024-01-01 VITALS
DIASTOLIC BLOOD PRESSURE: 78 MMHG | HEART RATE: 100 BPM | RESPIRATION RATE: 18 BRPM | SYSTOLIC BLOOD PRESSURE: 132 MMHG | TEMPERATURE: 97.8 F

## 2024-01-01 VITALS — RESPIRATION RATE: 18 BRPM | OXYGEN SATURATION: 92 % | HEART RATE: 117 BPM

## 2024-01-01 VITALS — TEMPERATURE: 98.1 F | RESPIRATION RATE: 20 BRPM | HEART RATE: 112 BPM

## 2024-01-01 PROCEDURE — G0156 HHCP-SVS OF AIDE,EA 15 MIN: HCPCS

## 2024-01-01 PROCEDURE — 10330064 BRIEF, WINGS CHOICE PLUS QUILTED MED (12

## 2024-01-01 PROCEDURE — G0299 HHS/HOSPICE OF RN EA 15 MIN: HCPCS

## 2024-01-01 PROCEDURE — 10330064 ALIGNER, FOAM BODY SM (8/CS)

## 2024-01-01 PROCEDURE — 10330064 GLOVE, EXAM VNYL LG N/S (100/BX 10BX/CS)

## 2024-01-01 PROCEDURE — 10330057 MEDICATION, GENERAL

## 2024-01-01 PROCEDURE — 10330064 BRIEF, WINGS CHOICE+ QUILTED LG (18/BG 4

## 2024-01-01 PROCEDURE — 10330087 HSPC SERVICE INTENSITY ADD-ON

## 2024-01-01 PROCEDURE — 10330064 UNDERPAD, REUSE 36X36 1DZ     BECKCL

## 2024-01-01 PROCEDURE — 10330064 PAD, HEEL CUSHION ULTRA (1/PR)SKLCRE

## 2024-01-05 ENCOUNTER — TELEPHONE (OUTPATIENT)
Age: 89
End: 2024-01-05

## 2024-01-05 ENCOUNTER — OFFICE VISIT (OUTPATIENT)
Dept: OBGYN CLINIC | Facility: CLINIC | Age: 89
End: 2024-01-05
Payer: MEDICARE

## 2024-01-05 VITALS
HEART RATE: 63 BPM | SYSTOLIC BLOOD PRESSURE: 104 MMHG | WEIGHT: 132 LBS | HEIGHT: 63 IN | BODY MASS INDEX: 23.39 KG/M2 | DIASTOLIC BLOOD PRESSURE: 71 MMHG

## 2024-01-05 DIAGNOSIS — E78.2 MIXED HYPERLIPIDEMIA: ICD-10-CM

## 2024-01-05 DIAGNOSIS — M48.062 SPINAL STENOSIS OF LUMBAR REGION WITH NEUROGENIC CLAUDICATION: Primary | ICD-10-CM

## 2024-01-05 DIAGNOSIS — I25.10 CORONARY ARTERY DISEASE INVOLVING NATIVE HEART WITHOUT ANGINA PECTORIS, UNSPECIFIED VESSEL OR LESION TYPE: ICD-10-CM

## 2024-01-05 DIAGNOSIS — M25.552 LEFT HIP PAIN: ICD-10-CM

## 2024-01-05 PROCEDURE — 99214 OFFICE O/P EST MOD 30 MIN: CPT | Performed by: STUDENT IN AN ORGANIZED HEALTH CARE EDUCATION/TRAINING PROGRAM

## 2024-01-05 RX ORDER — ATORVASTATIN CALCIUM 10 MG/1
TABLET, FILM COATED ORAL
Qty: 90 TABLET | Refills: 0 | Status: SHIPPED | OUTPATIENT
Start: 2024-01-05

## 2024-01-05 RX ORDER — METHYLPREDNISOLONE 4 MG/1
TABLET ORAL
Qty: 21 TABLET | Refills: 0 | Status: SHIPPED | OUTPATIENT
Start: 2024-01-05

## 2024-01-05 NOTE — ASSESSMENT & PLAN NOTE
Patient has a history, physical examination and radiographic evaluation consistent with lumbar DDD, DJD and stenosis with claudication    -Referral made to Spine & Pain specialist   -WBAT  -Activity modification to limit strain or impact on the joint  -ibuprofen (Motrin) as needed  -Tylenol 1000mg up to three times daily as needed. Do not exceed 3000mg daily  -Supervised physical therapy. Script provided   -Home exercise program directed by PT  -Weight loss discussed   -Cane or walker recommended to offload joint  -Corticosteroid injection was offered and accepted. Patient will be scheduled for FL guided CSI ot the hip  -Patient may follow up prn for further evaluation and treatment

## 2024-01-05 NOTE — TELEPHONE ENCOUNTER
Caller: Nereida    Doctor: Lior    Reason for call: patients daughter is calling stating a medication was to be sent to her pharmacy and a referral was supposed to be entered for Spine and Pain. Please advise.    Call back#: [phone]

## 2024-01-05 NOTE — PROGRESS NOTES
Date: 24  Damaris Truong   MRN# [id number]  : 1935      Chief Complaint: Radiating left hip and buttock pain    Assessment and Plan:    Spinal stenosis of lumbar region with neurogenic claudication  Patient has a history, physical examination and radiographic evaluation consistent with lumbar DDD, DJD and stenosis with claudication    -Referral made to Spine & Pain specialist   -WBAT  -Activity modification to limit strain or impact on the joint  -ibuprofen (Motrin) as needed  -Tylenol 1000mg up to three times daily as needed. Do not exceed 3000mg daily  -Supervised physical therapy. Script provided   -Home exercise program directed by PT  -Weight loss discussed   -Cane or walker recommended to offload joint  -Corticosteroid injection was offered and accepted. Patient will be scheduled for FL guided CSI ot the hip  -Patient may follow up prn for further evaluation and treatment         Subjective:     Hip Pain  Patient complains of left hip pain. This is evaluated as a personal injury. The pain began 1 week ago. The pain is located diffuse, buttock, and lateral and is made worse with walking and standing.  She describes the symptoms as sharp, shooting, and throbbing. Symptoms improve with rest, avoiding painful activities. The symptoms are worse with activity, weight bearing. The hip has not given out or felt unstable.  The patient is active in roller skating. Treatment to date has been Tylenol, NSAID's, without significant relief.    External Records Reviewed: ER records and radiology reports    Allergy:  No Known Allergies  Medications:  all current active meds have been reviewed  Past Medical History:  Past Medical History:   Diagnosis Date    Aorta aneurysm (HCC)     Basal cell carcinoma 2023    Left nasal tip    Cardiac disease     Fall 2019    GERD (gastroesophageal reflux disease)     Hematuria     last assessed: 10/28/2013    Hip pain, acute, left 2019    Shortness of  breath     last assessed: 2013     Past Surgical History:  Past Surgical History:   Procedure Laterality Date    ADENOIDECTOMY      APPENDECTOMY      BREAST BIOPSY Left     BREAST LUMPECTOMY Left     CATARACT EXTRACTION      CERVICAL FUSION      HYSTERECTOMY      age 51    MOHS SURGERY Left 2023    BCC- Left nasal tip    OOPHORECTOMY      OTHER SURGICAL HISTORY      paravaginal defect graft-reinforced repair    REPAIR RECTOCELE      TONSILLECTOMY      VEIN LIGATION AND STRIPPING Bilateral      Family History:  Family History   Problem Relation Age of Onset    Diabetes Mother         mellitus    Glaucoma Mother     Hypertension Mother     Macular degeneration Mother     Stroke Father         syndrome    Hypertension Father     Cancer Sister     Dementia Sister     Heart disease Sister     No Known Problems Daughter     No Known Problems Daughter     No Known Problems Sister     No Known Problems Sister     No Known Problems Sister     No Known Problems Sister     Breast cancer Sister 40    No Known Problems Maternal Grandmother     No Known Problems Maternal Grandfather     No Known Problems Paternal Grandmother     No Known Problems Paternal Grandfather     No Known Problems Sister     Cancer Maternal Aunt     Cancer Maternal Aunt     Cancer Maternal Aunt     No Known Problems Maternal Aunt     No Known Problems Maternal Aunt     No Known Problems Maternal Aunt     No Known Problems Paternal Aunt     No Known Problems Paternal Aunt     No Known Problems Paternal Aunt     No Known Problems Paternal Aunt     No Known Problems Paternal Aunt      Social History:  Social History     Substance and Sexual Activity   Alcohol Use No     Social History     Substance and Sexual Activity   Drug Use No     Social History     Tobacco Use   Smoking Status Former    Current packs/day: 0.00    Types: Cigarettes    Quit date:     Years since quittin.0   Smokeless Tobacco Never           ROS:   Review of Systems  "  All other systems reviewed and are negative.       Objective:   BP Readings from Last 1 Encounters:   01/05/24 104/71      Wt Readings from Last 1 Encounters:   01/05/24 59.9 kg (132 lb)      Pulse Readings from Last 1 Encounters:   01/05/24 63      BMI: Estimated body mass index is 23.76 kg/m² as calculated from the following:    Height as of this encounter: 5' 2.5\" (1.588 m).    Weight as of this encounter: 59.9 kg (132 lb).      Physical Exam  Constitutional:       General: She is not in acute distress.  HENT:      Head: Normocephalic and atraumatic.   Eyes:      Conjunctiva/sclera: Conjunctivae normal.   Cardiovascular:      Comments: Extremities well perfused   No LE edema    Pulmonary:      Effort: Pulmonary effort is normal.   Abdominal:      General: Abdomen is flat. Bowel sounds are normal.   Neurological:      Mental Status: She is alert. Mental status is at baseline.        Gait and Station:   antalgic    Left Hip     Inspection: normal color, temperature, turgor and moisture    Range of Motion: Full PROM without pain  Positive straight leg raise    negative  log roll   negative Trendelenburg sign  negative  Stinchfield  negative  SUSU  positive FADDIR    Motor: 5/5 Q/HS/TA/GS/EHL/FHL    Vascular: Toes WWP with BCR    SILT DP/SP/Monica/Saph/Tib      Images:    I personally reviewed relevant images in the PACS system and my interpretation is as follows:  X-rays of the left Hip reveal moderate, severe degenerative changes with subchondral cysts, subchondral sclerosis, joint space narrowing, and osteophyte formation  Limited view of the lumbar spine reveal multiple levels of degenerative disc disease, severe osteophytosis, and degenerative scoliosis      Malick Tinajero MD  Adult Reconstruction Specialist   [facility]   "

## 2024-01-06 ENCOUNTER — TELEPHONE (OUTPATIENT)
Dept: OBGYN CLINIC | Facility: CLINIC | Age: 89
End: 2024-01-06

## 2024-01-06 NOTE — TELEPHONE ENCOUNTER
Spoke with Nereida and advised her that the referral for Spine and Pain was in her mothers [facility] and the medication that was requested was sent to her pharmacy.

## 2024-02-02 ENCOUNTER — CONSULT (OUTPATIENT)
Dept: PAIN MEDICINE | Facility: CLINIC | Age: 89
End: 2024-02-02
Payer: MEDICARE

## 2024-02-02 VITALS
HEIGHT: 62 IN | BODY MASS INDEX: 22.82 KG/M2 | DIASTOLIC BLOOD PRESSURE: 68 MMHG | WEIGHT: 124 LBS | SYSTOLIC BLOOD PRESSURE: 115 MMHG

## 2024-02-02 DIAGNOSIS — M47.816 LUMBAR SPONDYLOSIS: ICD-10-CM

## 2024-02-02 DIAGNOSIS — M54.16 LUMBAR RADICULOPATHY: Primary | ICD-10-CM

## 2024-02-02 DIAGNOSIS — M54.40 LOW BACK PAIN WITH SCIATICA, SCIATICA LATERALITY UNSPECIFIED, UNSPECIFIED BACK PAIN LATERALITY, UNSPECIFIED CHRONICITY: ICD-10-CM

## 2024-02-02 PROCEDURE — 99204 OFFICE O/P NEW MOD 45 MIN: CPT | Performed by: ANESTHESIOLOGY

## 2024-02-02 NOTE — PROGRESS NOTES
Assessment  1. Lumbar radiculopathy    2. Lumbar spondylosis    3. Low back pain with sciatica, sciatica laterality unspecified, unspecified back pain laterality, unspecified chronicity        Plan  88-year-old female with a history of CAD, CKD, thoracic aortic aneurysm, hypertension, referred by Dr. Tinajero, presenting for initial consultation regarding lumbosacral back pain that radiates into the lateral aspect of the left lower extremity to the ankle with associated numbness.  Pain has been ongoing for the past month and a half without any significant trauma or inciting event.  X-ray of the lumbar spine demonstrates anterolisthesis of L5 on S1 and multilevel spondylosis. OA of both hips noted on x-ray.  The patient has done physical therapy without any significant relief.  She was prescribed a course of oral steroids which did provide some mild relief.  Unable to take NSAIDs secondary to kidney disease.  Tylenol provides minimal relief.  The patient's symptoms are suspicious for left L5 radiculopathy.  Hip exam was unremarkable today.    1.  I will order an MRI of the lumbar spine without contrast  2.  Patient may take Tylenol 500 to 1000 mg every 8 hours as needed  3.  Patient will continue with her home exercise program  4.  I will follow-up with the patient in 4 weeks      My impressions and treatment recommendations were discussed in detail with the patient who verbalized understanding and had no further questions.  Discharge instructions were provided. I personally saw and examined the patient and I agree with the above discussed plan of care.    No orders of the defined types were placed in this encounter.    No orders of the defined types were placed in this encounter.      History of Present Illness    Damaris Truong is a 88 y.o. female with a history of CAD, CKD, thoracic aortic aneurysm, hypertension, referred by Dr. Tinajero, presenting for initial consultation regarding lumbosacral back pain that  radiates into the lateral aspect of the left lower extremity to the ankle with associated numbness.  Pain has been ongoing for the past month and a half without any significant trauma or inciting event.  She denies any right lower extremity symptoms, bladder or bowel incontinence, or saddle anesthesia.  X-ray of the lumbar spine demonstrates anterolisthesis of L5 on S1 and multilevel spondylosis. OA of both hips noted on x-ray.  The patient has done physical therapy without any significant relief.  She was prescribed a course of oral steroids which did provide some mild relief.  Unable to take NSAIDs secondary to kidney disease.  Tylenol provides minimal relief.  The patient rates her pain an 8-9 out of 10 and the pain is nearly constant.  The pain is worse in the morning.  The pain is described as dull, aching, shooting, and numbness.  The pain is increased with standing, walking.  The pain is alleviated with lying down, sitting, and relaxation.  She does find some relief with heat and ice.    Other than as stated above, the patient denies any interval changes in medications, medical condition, mental condition, symptoms, or allergies since the last office visit.    I have personally reviewed and/or updated the patient's past medical history, past surgical history, family history, social history, current medications, allergies, and vital signs today.     Review of Systems   Constitutional:  Negative for fever and unexpected weight change.   HENT:  Positive for hearing loss. Negative for trouble swallowing.    Eyes:  Negative for visual disturbance.   Respiratory:  Negative for shortness of breath and wheezing.    Cardiovascular:  Negative for chest pain and palpitations.   Gastrointestinal:  Negative for constipation, diarrhea, nausea and vomiting.   Endocrine: Negative for cold intolerance, heat intolerance and polydipsia.   Genitourinary:  Negative for difficulty urinating and frequency.   Musculoskeletal:   Positive for arthralgias. Negative for gait problem, joint swelling and myalgias.   Skin:  Negative for rash.   Neurological:  Negative for dizziness, seizures, syncope, weakness and headaches.   Hematological:  Does not bruise/bleed easily.   Psychiatric/Behavioral:  Negative for dysphoric mood.    All other systems reviewed and are negative.      Patient Active Problem List   Diagnosis    Coronary artery disease involving native heart without angina pectoris    Thoracic aortic aneurysm without rupture (HCC)    Hypertension    Carotid artery obstruction, left    Aneurysm of ascending aorta (HCC)    Arteriosclerosis of carotid artery    Carotid atherosclerosis    Chronic kidney disease, stage 3 (HCC)    GERD without esophagitis    Hyperlipidemia    Impaired fasting glucose    Polyp of sigmoid colon    Medicare annual wellness visit, subsequent    Chronic left-sided low back pain without sciatica    Nasal lesion    Memory impairment    Chronic left shoulder pain    Rotator cuff tendonitis, left    Subacromial bursitis of left shoulder joint    Spinal stenosis of lumbar region with neurogenic claudication       Past Medical History:   Diagnosis Date    Aorta aneurysm (HCC)     Basal cell carcinoma 06/14/2023    Left nasal tip    Cardiac disease     Fall 05/23/2019    GERD (gastroesophageal reflux disease)     Hematuria     last assessed: 10/28/2013    Hip pain, acute, left 05/23/2019    Shortness of breath     last assessed: 6/27/2013       Past Surgical History:   Procedure Laterality Date    ADENOIDECTOMY      APPENDECTOMY      BREAST BIOPSY Left     BREAST LUMPECTOMY Left     CATARACT EXTRACTION      CERVICAL FUSION      HYSTERECTOMY      age 51    MOHS SURGERY Left 06/14/2023    BCC- Left nasal tip    OOPHORECTOMY      OTHER SURGICAL HISTORY      paravaginal defect graft-reinforced repair    REPAIR RECTOCELE      TONSILLECTOMY      VEIN LIGATION AND STRIPPING Bilateral        Family History   Problem Relation Age  "of Onset    Diabetes Mother         mellitus    Glaucoma Mother     Hypertension Mother     Macular degeneration Mother     Stroke Father         syndrome    Hypertension Father     Cancer Sister     Dementia Sister     Heart disease Sister     No Known Problems Daughter     No Known Problems Daughter     No Known Problems Sister     No Known Problems Sister     No Known Problems Sister     No Known Problems Sister     Breast cancer Sister 40    No Known Problems Maternal Grandmother     No Known Problems Maternal Grandfather     No Known Problems Paternal Grandmother     No Known Problems Paternal Grandfather     No Known Problems Sister     Cancer Maternal Aunt     Cancer Maternal Aunt     Cancer Maternal Aunt     No Known Problems Maternal Aunt     No Known Problems Maternal Aunt     No Known Problems Maternal Aunt     No Known Problems Paternal Aunt     No Known Problems Paternal Aunt     No Known Problems Paternal Aunt     No Known Problems Paternal Aunt     No Known Problems Paternal Aunt        Social History     Occupational History    Not on file   Tobacco Use    Smoking status: Former     Current packs/day: 0.00     Types: Cigarettes     Quit date:      Years since quittin.1    Smokeless tobacco: Never   Vaping Use    Vaping status: Never Used   Substance and Sexual Activity    Alcohol use: No    Drug use: No    Sexual activity: Never       Current Outpatient Medications on File Prior to Visit   Medication Sig    atorvastatin (LIPITOR) 10 mg tablet TAKE 1 TABLET(10 MG) BY MOUTH DAILY    methylPREDNISolone 4 MG tablet therapy pack Use as directed on package    metoprolol tartrate (LOPRESSOR) 50 mg tablet TAKE 1 TABLET(50 MG) BY MOUTH EVERY 12 HOURS    triamterene-hydrochlorothiazide (MAXZIDE-25) 37.5-25 mg per tablet TAKE 1 TABLET BY MOUTH DAILY     No current facility-administered medications on file prior to visit.       No Known Allergies    Physical Exam    /68   Ht 5' 2\" (1.575 m)   Wt " 56.2 kg (124 lb)   BMI 22.68 kg/m²     Constitutional: normal, well developed, well nourished, alert, in no distress and non-toxic and no overt pain behavior.  Eyes: anicteric  HEENT: grossly intact  Neck: supple, symmetric, trachea midline and no masses   Pulmonary:even and unlabored  Cardiovascular:No edema or pitting edema present  Skin:Normal without rashes or lesions and well hydrated  Psychiatric:Mood and affect appropriate  Neurologic:Cranial Nerves II-XII grossly intact  Musculoskeletal:antalgic gait.  Left lumbar paraspinals tender to palpation from L4-S1.  Bilateral SI joints nontender to palpation.  Bilateral patellar and Achilles reflexes were 2 out of 4 and symmetrical.  No clonus noted bilaterally.  Bilateral lower extremity strength 5 out of 5 in all muscle groups.  Sensation intact to touch in L3-S1 dermatomes bilaterally.  Positive straight leg raise on the left and negative on the right.  Negative Fahad's test bilaterally.    Imaging    Study Result    Narrative & Impression   LEFT HIP     INDICATION:   left hip pain. No reported trauma. Difficulty ambulating, uses cane. Left leg pain for approximately 1 week. Seen by PCP, told to rest and see physical therapy.     COMPARISON: 5/23/2019     VIEWS:  XR HIP/PELV 2-3 VWS LEFT  W PELVIS IF PERFORMED        FINDINGS:     Bones are intact.  Alignment is preserved.     Similar spine and bilateral hip degenerative change.     No lytic or blastic bone lesions.     Similar bilateral inguinal surgical clips and vascular calcification.     Findings agree with the ED preliminary interpretation.     IMPRESSION:  No acute osseous abnormality. Stable finding above.           Workstation performed: [id number]     Study Result    Narrative & Impression   LUMBAR SPINE     INDICATION:   W19.XXXA: Unspecified fall, initial encounter  M25.552: Pain in left hip  M54.5: Low back pain.     COMPARISON:  Lumbar spine radiograph 12/7/2017     VIEWS:  XR SPINE LUMBAR  MINIMUM 4 VIEWS NON INJURY        FINDINGS:     There is no evidence of acute fracture or destructive osseous lesion.     There is grade 1 anterior spondylolisthesis of L5 on S1.     Moderate endplate and facet joint degenerative changes throughout the lumbar spine.     The pedicles appear intact.     There are atherosclerotic calcifications. Soft tissues are otherwise unremarkable.     IMPRESSION:     No acute osseous abnormality.       Degenerative changes as described.        Workstation performed: [id number]

## 2024-02-03 DIAGNOSIS — I10 ESSENTIAL HYPERTENSION: ICD-10-CM

## 2024-02-04 RX ORDER — TRIAMTERENE AND HYDROCHLOROTHIAZIDE 37.5; 25 MG/1; MG/1
1 TABLET ORAL DAILY
Qty: 30 TABLET | Refills: 0 | Status: SHIPPED | OUTPATIENT
Start: 2024-02-04 | End: 2024-02-06 | Stop reason: SDUPTHER

## 2024-02-06 DIAGNOSIS — I10 ESSENTIAL HYPERTENSION: ICD-10-CM

## 2024-02-06 RX ORDER — TRIAMTERENE AND HYDROCHLOROTHIAZIDE 37.5; 25 MG/1; MG/1
1 TABLET ORAL DAILY
Qty: 90 TABLET | Refills: 0 | Status: SHIPPED | OUTPATIENT
Start: 2024-02-06 | End: 2024-03-07

## 2024-02-17 ENCOUNTER — HOSPITAL ENCOUNTER (OUTPATIENT)
Dept: RADIOLOGY | Age: 89
Discharge: HOME | End: 2024-02-17
Payer: MEDICARE

## 2024-02-17 DIAGNOSIS — M54.16 LUMBAR RADICULOPATHY: ICD-10-CM

## 2024-02-17 PROCEDURE — 72148 MRI LUMBAR SPINE W/O DYE: CPT

## 2024-02-21 PROBLEM — Z00.00 MEDICARE ANNUAL WELLNESS VISIT, SUBSEQUENT: Status: RESOLVED | Noted: 2018-10-24 | Resolved: 2024-02-21

## 2024-02-21 NOTE — PROGRESS NOTES
Assessment:  No diagnosis found.    Plan:  ***  {PDMP Statement:74353}    {UDS Statement:70896}    {Opioid Statement:92743}    {Pain Management Procedure Risk Statement:39344}    {Oral Swab Statement:67262}      The patient will follow-up in {Follow Up:99496} for medication prescription refill and reevaluation. The patient was advised to contact the office should their symptoms worsen in the interim. The patient was agreeable and verbalized an understanding.        History of Present Illness:    The patient is a 88 y.o. female last seen on 02/02/2024  who presents for a follow up office visit in regards to chronic pain secondary to ***.  The patient currently reports ***    Current pain medications includes:  .  The patient reports that this regimen is providing ***% pain relief.  The patient is reporting {Side Effects:19637} from this pain medication regimen.    Pain Contract Signed: ***  Last Urine Drug Screen: ***    I have personally reviewed and/or updated the patient's past medical history, past surgical history, family history, social history, current medications, allergies, and vital signs today.       Review of Systems:    Review of Systems      Past Medical History:   Diagnosis Date    Aorta aneurysm (HCC)     Basal cell carcinoma 06/14/2023    Left nasal tip    Cardiac disease     Fall 05/23/2019    GERD (gastroesophageal reflux disease)     Hematuria     last assessed: 10/28/2013    Hip pain, acute, left 05/23/2019    Shortness of breath     last assessed: 6/27/2013       Past Surgical History:   Procedure Laterality Date    ADENOIDECTOMY      APPENDECTOMY      BREAST BIOPSY Left     BREAST LUMPECTOMY Left     CATARACT EXTRACTION      CERVICAL FUSION      HYSTERECTOMY      age 51    MOHS SURGERY Left 06/14/2023    BCC- Left nasal tip    OOPHORECTOMY      OTHER SURGICAL HISTORY      paravaginal defect graft-reinforced repair    REPAIR RECTOCELE      TONSILLECTOMY      VEIN LIGATION AND STRIPPING Bilateral         Family History   Problem Relation Age of Onset    Diabetes Mother         mellitus    Glaucoma Mother     Hypertension Mother     Macular degeneration Mother     Stroke Father         syndrome    Hypertension Father     Cancer Sister     Dementia Sister     Heart disease Sister     No Known Problems Daughter     No Known Problems Daughter     No Known Problems Sister     No Known Problems Sister     No Known Problems Sister     No Known Problems Sister     Breast cancer Sister 40    No Known Problems Maternal Grandmother     No Known Problems Maternal Grandfather     No Known Problems Paternal Grandmother     No Known Problems Paternal Grandfather     No Known Problems Sister     Cancer Maternal Aunt     Cancer Maternal Aunt     Cancer Maternal Aunt     No Known Problems Maternal Aunt     No Known Problems Maternal Aunt     No Known Problems Maternal Aunt     No Known Problems Paternal Aunt     No Known Problems Paternal Aunt     No Known Problems Paternal Aunt     No Known Problems Paternal Aunt     No Known Problems Paternal Aunt        Social History     Occupational History    Not on file   Tobacco Use    Smoking status: Former     Current packs/day: 0.00     Types: Cigarettes     Quit date:      Years since quittin.1    Smokeless tobacco: Never   Vaping Use    Vaping status: Never Used   Substance and Sexual Activity    Alcohol use: No    Drug use: No    Sexual activity: Never         Current Outpatient Medications:     triamterene-hydrochlorothiazide (MAXZIDE-25) 37.5-25 mg per tablet, Take 1 tablet by mouth daily, Disp: 90 tablet, Rfl: 0    atorvastatin (LIPITOR) 10 mg tablet, TAKE 1 TABLET(10 MG) BY MOUTH DAILY, Disp: 90 tablet, Rfl: 0    methylPREDNISolone 4 MG tablet therapy pack, Use as directed on package (Patient not taking: Reported on 2024), Disp: 21 tablet, Rfl: 0    metoprolol tartrate (LOPRESSOR) 50 mg tablet, TAKE 1 TABLET(50 MG) BY MOUTH EVERY 12 HOURS, Disp: 180 tablet, Rfl:  "3    No Known Allergies    Physical Exam:    There were no vitals taken for this visit.    Constitutional:{General Appearance:97809::\"normal, well developed, well nourished, alert, in no distress and non-toxic and no overt pain behavior.\"}  Eyes:{Sclera:00701::\"anicteric\"}  HEENT:{Hearin::\"grossly intact\"}  Neck:{Neck:09713::\"supple, symmetric, trachea midline and no masses \"}  Pulmonary:{Respiratory effort:44462::\"even and unlabored\"}  Cardiovascular:{Examination of Extremities:94457::\"No edema or pitting edema present\"}  Skin:{Skin and Subcutaneous tissues:34012::\"Normal without rashes or lesions and well hydrated\"}  Psychiatric:{Mood and Affect:68073::\"Mood and affect appropriate\"}  Neurologic:{Cranial Nerves:61645::\"Cranial Nerves II-XII grossly intact\"}  Musculoskeletal:{Gair and Station:72966::\"normal\"}      Imaging  No orders to display         No orders of the defined types were placed in this encounter.      "

## 2024-02-27 NOTE — PROGRESS NOTES
Assessment:  1. Lumbar radiculopathy    2. Chronic bilateral low back pain, unspecified whether sciatica present        Plan:  Patient will be scheduled for a left L4 and L5 TFESI to address the inflammatory component of the patient's pain.  Complete risks and benefits including bleeding, infection, tissue reaction, nerve injury and allergic reaction were discussed. The patient was agreeable and verbalized an understanding  May consider trial of gabapentin in the future.  Patient defers at this time  Continue with home exercise program  Patient may continue Tylenol as needed and should not exceed more than 3000 mg in 24 hours  Will avoid NSAIDs secondary to CKD  Follow-up after procedure or sooner if needed    History of Present Illness:    The patient is a 88 y.o. female with a history of CAD, CKD, thoracic aortic aneurysm and hypertension last seen on 02/02/2024  who presents for a follow up office visit in regards to chronic lumbosacral back pain that radiates into the lateral aspect of the left lower extremity to the ankle with associated paresthesias.  She denies right-sided symptoms, bowel or bladder incontinence or saddle anesthesia.  Patient states pain waxes and wanes throughout the day.  She did have an MRI of the lumbar spine completed which demonstrates anterior shifting of the L5 vertebrae on top of the sacrum secondary to bony defects of the L5 vertebrae, with multilevel degenerative disc disease and arthritis. 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     The patient rates her pain a 5 out of 10 on the numeric pain rating scale.  Pain is intermittent in the morning and it is described as dull aching and shooting.  She manages her pain with Tylenol as needed which provides minimal relief.  She is unable to take NSAIDs secondary to CKD.  She has tried oral steroids which provided mild relief.    I have personally reviewed and/or updated the patient's past  medical history, past surgical history, family history, social history, current medications, allergies, and vital signs today.       Review of Systems:    Review of Systems      Past Medical History:   Diagnosis Date    Aorta aneurysm (HCC)     Basal cell carcinoma 06/14/2023    Left nasal tip    Cardiac disease     Fall 05/23/2019    GERD (gastroesophageal reflux disease)     Hematuria     last assessed: 10/28/2013    Hip pain, acute, left 05/23/2019    Shortness of breath     last assessed: 6/27/2013       Past Surgical History:   Procedure Laterality Date    ADENOIDECTOMY      APPENDECTOMY      BREAST BIOPSY Left     BREAST LUMPECTOMY Left     CATARACT EXTRACTION      CERVICAL FUSION      HYSTERECTOMY      age 51    MOHS SURGERY Left 06/14/2023    BCC- Left nasal tip    OOPHORECTOMY      OTHER SURGICAL HISTORY      paravaginal defect graft-reinforced repair    REPAIR RECTOCELE      TONSILLECTOMY      VEIN LIGATION AND STRIPPING Bilateral        Family History   Problem Relation Age of Onset    Diabetes Mother         mellitus    Glaucoma Mother     Hypertension Mother     Macular degeneration Mother     Stroke Father         syndrome    Hypertension Father     Cancer Sister     Dementia Sister     Heart disease Sister     No Known Problems Daughter     No Known Problems Daughter     No Known Problems Sister     No Known Problems Sister     No Known Problems Sister     No Known Problems Sister     Breast cancer Sister 40    No Known Problems Maternal Grandmother     No Known Problems Maternal Grandfather     No Known Problems Paternal Grandmother     No Known Problems Paternal Grandfather     No Known Problems Sister     Cancer Maternal Aunt     Cancer Maternal Aunt     Cancer Maternal Aunt     No Known Problems Maternal Aunt     No Known Problems Maternal Aunt     No Known Problems Maternal Aunt     No Known Problems Paternal Aunt     No Known Problems Paternal Aunt     No Known Problems Paternal Aunt     No  "Known Problems Paternal Aunt     No Known Problems Paternal Aunt        Social History     Occupational History    Not on file   Tobacco Use    Smoking status: Former     Current packs/day: 0.00     Types: Cigarettes     Quit date:      Years since quittin.1    Smokeless tobacco: Never   Vaping Use    Vaping status: Never Used   Substance and Sexual Activity    Alcohol use: No    Drug use: No    Sexual activity: Never         Current Outpatient Medications:     atorvastatin (LIPITOR) 10 mg tablet, TAKE 1 TABLET(10 MG) BY MOUTH DAILY, Disp: 90 tablet, Rfl: 0    metoprolol tartrate (LOPRESSOR) 50 mg tablet, TAKE 1 TABLET(50 MG) BY MOUTH EVERY 12 HOURS, Disp: 180 tablet, Rfl: 3    triamterene-hydrochlorothiazide (MAXZIDE-25) 37.5-25 mg per tablet, Take 1 tablet by mouth daily, Disp: 90 tablet, Rfl: 0    methylPREDNISolone 4 MG tablet therapy pack, Use as directed on package (Patient not taking: Reported on 2024), Disp: 21 tablet, Rfl: 0    No Known Allergies    Physical Exam:    /74   Pulse 81   Ht 5' 2\" (1.575 m)   Wt 57.6 kg (127 lb)   BMI 23.23 kg/m²     Constitutional:normal, well developed, well nourished, alert, in no distress and non-toxic and no overt pain behavior.  Eyes:anicteric  HEENT:grossly intact  Neck:supple, symmetric, trachea midline and no masses   Pulmonary:even and unlabored  Cardiovascular:No edema or pitting edema present  Skin:Normal without rashes or lesions and well hydrated  Psychiatric:Mood and affect appropriate  Neurologic:Cranial Nerves II-XII grossly intact  Musculoskeletal: Slightly antalgic gait but steady without assistive devices.  Bilateral lower extremity strength 5 out of 5 in all muscle groups.  Sensation intact to light touch in L3-S1 dermatomes bilaterally.  Negative seated straight leg raise bilaterally      Imaging  [place] spine and pain procedure    (Results Pending)     Narrative & Impression  MRI LUMBAR SPINE WITHOUT CONTRAST     INDICATION: M54.16: " Radiculopathy, lumbar region.     COMPARISON: 5/23/2019 and 4/5/2004     TECHNIQUE:  Multiplanar, multisequence imaging of the lumbar spine was performed. .        IMAGE QUALITY:  Diagnostic     FINDINGS:     VERTEBRAL BODIES:  There are 5 lumbar type vertebral bodies. There is retrolisthesis of L2-L3 and L3-L4. There is grade 1 anterolisthesis of L5-S1 secondary to bilateral pars interarticularis defects. There is no suspicious marrow signal abnormality   identified. There is a Schmorl's node along the superior endplate of T12.     SACRUM:  Normal signal within the sacrum. No evidence of insufficiency or stress fracture.     DISTAL CORD AND CONUS:  Normal size and signal within the distal cord and conus.     PARASPINAL SOFT TISSUES:  Paraspinal soft tissues are unremarkable.     LOWER THORACIC DISC SPACES:  Normal disc height and signal.  No disc herniation, canal stenosis or foraminal narrowing.     LUMBAR DISC SPACES:     L1-L2: Mild bulge. Facet arthrosis with ligamentum flavum thickening. Mild mass effect on the thecal sac. No significant foraminal narrowing.     L2-L3: Bulging annulus. Facet arthrosis. Mild mass effect on the thecal sac. Mild left foraminal narrowing. Moderate right foraminal narrowing.     L3-L4: Bulging annulus. Facet arthrosis. Mild mass effect on the thecal sac. Moderate bilateral foraminal narrowing.     L4-L5: Bulging annulus. Facet arthrosis with ligamentum flavum thickening. Severe mass effect on the thecal sac. Severe right foraminal narrowing with impingement the exiting nerve root. Moderate left foraminal narrowing with contact of the exiting nerve   root.     L5-S1: Uncovering of the disc space. Marked facet arthrosis. Severe mass effect on the thecal sac. Severe left foraminal narrowing with impingement of the exiting nerve root. Moderate right foraminal narrowing.     OTHER FINDINGS:  None.     IMPRESSION:     Degenerative changes of the lumbar spine, as described above.      Multifactorial disease results in severe mass effect on the thecal sac at L4-L5 and L5-S1.     Severe foraminal narrowing is present on the right at L4-L5 and on the left at L5-S1 with impingement of the exiting nerve roots.     Grade 1 anterolisthesis L5-S1 secondary to bilateral pars interarticularis defect.        Workstation performed: [id number]         Orders Placed This Encounter   Procedures    [facility] spine and pain procedure

## 2024-02-29 ENCOUNTER — TELEPHONE (OUTPATIENT)
Age: 89
End: 2024-02-29

## 2024-02-29 NOTE — TELEPHONE ENCOUNTER
S/w pt's daughter Nereida and advised of same. Appreciative of call and would like to keep appt tomorrow to review with provider.

## 2024-02-29 NOTE — TELEPHONE ENCOUNTER
Please notify the patient the MRI of the lumbar spine demonstrates anterior shifting of the L5 vertebrae on top of the sacrum secondary to bony defects of the L5 vertebrae, with multilevel degenerative disc disease and arthritis.  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  I can offer the patient a left L4 and L5 TFESI.  If she would like to discuss this further we can discuss at office visit, if she would just like to proceed with interventional therapy, okay to schedule procedure followed by follow-up office visit 3 weeks after injection.  If she would like to discuss medication options, would advise the patient to keep her appointment to discuss further

## 2024-02-29 NOTE — TELEPHONE ENCOUNTER
Caller: Nereida (Daughter)    Doctor: Nova    Reason for call: Wanting to find out results of MRI prior to appointment to see what will be done at appointment tomorrow.       Please advise     Call back#: [phone]

## 2024-03-01 ENCOUNTER — OFFICE VISIT (OUTPATIENT)
Dept: PAIN MEDICINE | Facility: CLINIC | Age: 89
End: 2024-03-01
Payer: MEDICARE

## 2024-03-01 VITALS
HEART RATE: 81 BPM | HEIGHT: 62 IN | WEIGHT: 127 LBS | BODY MASS INDEX: 23.37 KG/M2 | DIASTOLIC BLOOD PRESSURE: 74 MMHG | SYSTOLIC BLOOD PRESSURE: 126 MMHG

## 2024-03-01 DIAGNOSIS — M54.16 LUMBAR RADICULOPATHY: Primary | ICD-10-CM

## 2024-03-01 DIAGNOSIS — G89.29 CHRONIC BILATERAL LOW BACK PAIN, UNSPECIFIED WHETHER SCIATICA PRESENT: ICD-10-CM

## 2024-03-01 DIAGNOSIS — M54.50 CHRONIC BILATERAL LOW BACK PAIN, UNSPECIFIED WHETHER SCIATICA PRESENT: ICD-10-CM

## 2024-03-01 PROCEDURE — 99214 OFFICE O/P EST MOD 30 MIN: CPT | Performed by: NURSE PRACTITIONER

## 2024-03-01 NOTE — PATIENT INSTRUCTIONS
Epidural Steroid Injection   AMBULATORY CARE:   What you need to know about an epidural steroid injection (CHUY):  An CHUY is a procedure to inject steroid medicine into the epidural space. The epidural space is between your spinal cord and vertebrae. Steroids reduce inflammation and fluid buildup in your spine that may be causing pain. You may be given pain medicine along with the steroids.        How to prepare for an CHUY:  Your provider will talk to you about how to prepare for your procedure. He or she will tell you what medicines to take or not take on the day of your procedure. You may need to stop taking blood thinners or other medicines several days before your procedure. You may need to adjust any diabetes medicine you take on the day of your procedure. Steroid medicine can increase your blood sugar level. Arrange for someone to drive you home when you are discharged.  What will happen during an CHUY:   You will be given medicine to numb the procedure area. You will be awake for the procedure, but you will not feel pain. You may also be given medicine to help you relax. Contrast liquid will be used to help your provider see the area better. Tell the provider if you have ever had an allergic reaction to contrast liquid.    Your provider may place the needle into your neck area, middle of your back, or tailbone area. He or she may inject the medicine next to the nerves that are causing your pain. He or she may instead inject the medicine into a larger area of the epidural space. This helps the medicine spread to more nerves. Your provider will use a fluoroscope to help guide the needle to the right place. A fluoroscope is a type of x-ray. After the procedure, a bandage will be placed over the injection site to prevent infection.    What will happen after an CHUY:  You will have a bandage over the injection site to prevent infection. Your provider will tell you when you can bathe and any activity guidelines. You  will be able to go home.  Risks of an CHUY:  You may have temporary or permanent nerve damage or paralysis. You may have bleeding or develop a serious infection, such as meningitis (swelling of the brain coverings). An abscess may also develop. An abscess is a pus-filled area under the skin. You may need surgery to fix the abscess. You may have a seizure, anxiety, or trouble sleeping. If you are a man, you may have temporary erectile dysfunction (not able to have an erection).   Call your local emergency number (911 in the [place]) if:   You have a seizure.    You have trouble moving your legs.    Seek care immediately if:   Blood soaks through your bandage.    You have a fever or chills, severe back pain, and the procedure area is sensitive to the touch.    You cannot control when you urinate or have a bowel movement.    Call your doctor if:   You have weakness or numbness in your legs.    Your wound is red, swollen, or draining pus.    You have nausea or are vomiting.    Your face or neck is red and you feel warm.    You have more pain than you had before the procedure.    You have swelling in your hands or feet.    You have questions or concerns about your condition or care.    Care for your wound as directed:  You may remove the bandage before you go to bed the day of your procedure. You may take a shower, but do not take a bath for at least 24 hours.   Self-care:   Do not drive,  use machines, or do strenuous activity for 24 hours after your procedure or as directed.     Continue other treatments  as directed. Steroid injections alone will not control your pain. The injections are meant to be used with other treatments, such as physical therapy.    Follow up with your doctor as directed:  Write down your questions so you remember to ask them during your visits.   © Copyright [employer] 2023 Information is for End User's use only and may not be sold, redistributed or otherwise used for commercial purposes.  The above  information is an  only. It is not intended as medical advice for individual conditions or treatments. Talk to your doctor, nurse or pharmacist before following any medical regimen to see if it is safe and effective for you.

## 2024-03-19 ENCOUNTER — HOSPITAL ENCOUNTER (OUTPATIENT)
Dept: RADIOLOGY | Facility: CLINIC | Age: 89
Discharge: HOME | End: 2024-03-19
Payer: MEDICARE

## 2024-03-19 VITALS
SYSTOLIC BLOOD PRESSURE: 112 MMHG | DIASTOLIC BLOOD PRESSURE: 72 MMHG | OXYGEN SATURATION: 95 % | TEMPERATURE: 98.4 F | HEART RATE: 66 BPM | RESPIRATION RATE: 20 BRPM

## 2024-03-19 DIAGNOSIS — M54.16 LUMBAR RADICULOPATHY: ICD-10-CM

## 2024-03-19 PROCEDURE — 64483 NJX AA&/STRD TFRM EPI L/S 1: CPT | Performed by: ANESTHESIOLOGY

## 2024-03-19 PROCEDURE — 64484 NJX AA&/STRD TFRM EPI L/S EA: CPT | Performed by: ANESTHESIOLOGY

## 2024-03-19 RX ORDER — PAPAVERINE HCL 150 MG
15 CAPSULE, EXTENDED RELEASE ORAL ONCE
Status: COMPLETED | OUTPATIENT
Start: 2024-03-19 | End: 2024-03-19

## 2024-03-19 RX ADMIN — IOHEXOL 2 ML: 300 INJECTION, SOLUTION INTRAVENOUS at 11:01

## 2024-03-19 RX ADMIN — DEXAMETHASONE SODIUM PHOSPHATE 15 MG: 10 INJECTION, SOLUTION INTRAMUSCULAR; INTRAVENOUS at 11:04

## 2024-03-19 RX ADMIN — LIDOCAINE HYDROCHLORIDE 2 ML: 20 INJECTION, SOLUTION EPIDURAL; INFILTRATION; INTRACAUDAL; PERINEURAL at 11:04

## 2024-03-19 NOTE — H&P
History of Present Illness: The patient is a 88 y.o. female who presents with complaints of low back and leg pain.    Past Medical History:   Diagnosis Date    Aorta aneurysm (HCC)     Basal cell carcinoma 06/14/2023    Left nasal tip    Cardiac disease     Fall 05/23/2019    GERD (gastroesophageal reflux disease)     Hematuria     last assessed: 10/28/2013    Hip pain, acute, left 05/23/2019    Shortness of breath     last assessed: 6/27/2013       Past Surgical History:   Procedure Laterality Date    ADENOIDECTOMY      APPENDECTOMY      BREAST BIOPSY Left     BREAST LUMPECTOMY Left     CATARACT EXTRACTION      CERVICAL FUSION      HYSTERECTOMY      age 51    MOHS SURGERY Left 06/14/2023    BCC- Left nasal tip    OOPHORECTOMY      OTHER SURGICAL HISTORY      paravaginal defect graft-reinforced repair    REPAIR RECTOCELE      TONSILLECTOMY      VEIN LIGATION AND STRIPPING Bilateral          Current Outpatient Medications:     atorvastatin (LIPITOR) 10 mg tablet, TAKE 1 TABLET(10 MG) BY MOUTH DAILY, Disp: 90 tablet, Rfl: 0    methylPREDNISolone 4 MG tablet therapy pack, Use as directed on package (Patient not taking: Reported on 2/2/2024), Disp: 21 tablet, Rfl: 0    metoprolol tartrate (LOPRESSOR) 50 mg tablet, TAKE 1 TABLET(50 MG) BY MOUTH EVERY 12 HOURS, Disp: 180 tablet, Rfl: 3    triamterene-hydrochlorothiazide (MAXZIDE-25) 37.5-25 mg per tablet, Take 1 tablet by mouth daily, Disp: 90 tablet, Rfl: 0    No Known Allergies    Physical Exam:   Vitals:    03/19/24 1036   BP: 140/86   Pulse: 66   Resp: 18   Temp: 98.4 °F (36.9 °C)   SpO2: 98%     General: Awake, Alert, Oriented x 3, Mood and affect appropriate  Respiratory: Respirations even and unlabored  Cardiovascular: Peripheral pulses intact; no edema  Musculoskeletal Exam: antalgic gait    ASA Score: 3         Assessment:   1. Lumbar radiculopathy        Plan: Left L4 and L5 TFESI

## 2024-03-19 NOTE — DISCHARGE INSTRUCTIONS
Epidural Steroid Injection   WHAT YOU NEED TO KNOW:   An epidural steroid injection (CHUY) is a procedure to inject steroid medicine into the epidural space. The epidural space is between your spinal cord and vertebrae. Steroids reduce inflammation and fluid buildup in your spine that may be causing pain. You may be given pain medicine along with the steroids.          ACTIVITY  Do not drive or operate machinery today.  No strenuous activity today - bending, lifting, etc.  You may resume normal activites starting tomorrow - start slowly and as tolerated.  You may shower today, but no tub baths or hot tubs.  You may have numbness for several hours from the local anesthetic. Please use caution and common sense, especially with weight-bearing activities.    CARE OF THE INJECTION SITE  If you have soreness or pain, apply ice to the area today (20 minutes on/20 minutes off).  Starting tomorrow, you may use warm, moist heat or ice if needed.  You may have an increase or change in your discomfort for 36-48 hours after your treatment.  Apply ice and continue with any pain medication you have been prescribed.  [facility] if you have any of the following: redness, drainage, swelling, headache, stiff neck or fever above 100°F.    SPECIAL INSTRUCTIONS  Our office will contact you in approximately 7 days for a progress report.    MEDICATIONS  Continue to take all routine medications.  Our office may have instructed you to hold some medications.    As no general anesthesia was used in today's procedure, you should not experience any side effects related to anesthesia.     If you are diabetic, the steroids used in today's injection may temporarily increase your blood sugar levels after the first few days after your injection. Please keep a close eye on your sugars and alert the doctor who manages your diabetes if your sugars are significantly high from your baseline or you are symptomatic.     If you have a  problem specifically related to your procedure, please call our office at [phone].  Problems not related to your procedure should be directed to your primary care physician.    Never

## 2024-03-26 ENCOUNTER — TELEPHONE (OUTPATIENT)
Dept: PAIN MEDICINE | Facility: CLINIC | Age: 89
End: 2024-03-26

## 2024-04-09 DIAGNOSIS — R41.0 CONFUSION: ICD-10-CM

## 2024-04-09 DIAGNOSIS — E78.2 MIXED HYPERLIPIDEMIA: ICD-10-CM

## 2024-04-09 DIAGNOSIS — R73.01 IMPAIRED FASTING GLUCOSE: ICD-10-CM

## 2024-04-09 DIAGNOSIS — I10 PRIMARY HYPERTENSION: Primary | ICD-10-CM

## 2024-04-11 ENCOUNTER — OFFICE VISIT (OUTPATIENT)
Dept: FAMILY MEDICINE CLINIC | Facility: CLINIC | Age: 89
End: 2024-04-11
Payer: MEDICARE

## 2024-04-11 VITALS
WEIGHT: 120.6 LBS | OXYGEN SATURATION: 96 % | DIASTOLIC BLOOD PRESSURE: 66 MMHG | TEMPERATURE: 96.4 F | HEART RATE: 64 BPM | HEIGHT: 62 IN | SYSTOLIC BLOOD PRESSURE: 88 MMHG | RESPIRATION RATE: 18 BRPM | BODY MASS INDEX: 22.19 KG/M2

## 2024-04-11 DIAGNOSIS — R79.89 ABNORMAL LFTS: ICD-10-CM

## 2024-04-11 DIAGNOSIS — I95.2 HYPOTENSION DUE TO DRUGS: Primary | ICD-10-CM

## 2024-04-11 DIAGNOSIS — I71.21 ANEURYSM OF ASCENDING AORTA WITHOUT RUPTURE (HCC): ICD-10-CM

## 2024-04-11 DIAGNOSIS — E87.1 ACUTE HYPONATREMIA: ICD-10-CM

## 2024-04-11 DIAGNOSIS — N18.32 STAGE 3B CHRONIC KIDNEY DISEASE (HCC): ICD-10-CM

## 2024-04-11 DIAGNOSIS — E87.6 HYPOKALEMIA: ICD-10-CM

## 2024-04-11 DIAGNOSIS — R63.4 WEIGHT LOSS, ABNORMAL: ICD-10-CM

## 2024-04-11 LAB
ALBUMIN SERPL-MCNC: 3.7 G/DL (ref 3.6–5.1)
ALBUMIN/GLOB SERPL: 1.1 (CALC) (ref 1–2.5)
ALP SERPL-CCNC: 192 U/L (ref 37–153)
ALT SERPL-CCNC: 37 U/L (ref 6–29)
AST SERPL-CCNC: 45 U/L (ref 10–35)
BILIRUB SERPL-MCNC: 1 MG/DL (ref 0.2–1.2)
BUN SERPL-MCNC: 28 MG/DL (ref 7–25)
BUN/CREAT SERPL: 20 (CALC) (ref 6–22)
CALCIUM SERPL-MCNC: 9.1 MG/DL (ref 8.6–10.4)
CHLORIDE SERPL-SCNC: 86 MMOL/L (ref 98–110)
CO2 SERPL-SCNC: 29 MMOL/L (ref 20–32)
CREAT SERPL-MCNC: 1.41 MG/DL (ref 0.6–0.95)
ERYTHROCYTE [DISTWIDTH] IN BLOOD BY AUTOMATED COUNT: 13.6 % (ref 11–15)
EST. AVERAGE GLUCOSE BLD GHB EST-MCNC: 137 MG/DL
EST. AVERAGE GLUCOSE BLD GHB EST-SCNC: 7.6 MMOL/L
GFR/BSA.PRED SERPLBLD CYS-BASED-ARV: 36 ML/MIN/1.73M2
GLOBULIN SER CALC-MCNC: 3.3 G/DL (CALC) (ref 1.9–3.7)
GLUCOSE SERPL-MCNC: 142 MG/DL (ref 65–99)
HBA1C MFR BLD: 6.4 % OF TOTAL HGB
HCT VFR BLD AUTO: 35.6 % (ref 35–45)
HGB BLD-MCNC: 12.1 G/DL (ref 11.7–15.5)
MCH RBC QN AUTO: 30.2 PG (ref 27–33)
MCHC RBC AUTO-ENTMCNC: 34 G/DL (ref 32–36)
MCV RBC AUTO: 88.8 FL (ref 80–100)
PLATELET # BLD AUTO: 250 THOUSAND/UL (ref 140–400)
PMV BLD REES-ECKER: 9.5 FL (ref 7.5–12.5)
POTASSIUM SERPL-SCNC: 3.2 MMOL/L (ref 3.5–5.3)
PROT SERPL-MCNC: 7 G/DL (ref 6.1–8.1)
RBC # BLD AUTO: 4.01 MILLION/UL (ref 3.8–5.1)
SODIUM SERPL-SCNC: 129 MMOL/L (ref 135–146)
TSH SERPL-ACNC: 2.7 MIU/L (ref 0.4–4.5)
WBC # BLD AUTO: 4.6 THOUSAND/UL (ref 3.8–10.8)

## 2024-04-11 PROCEDURE — G2211 COMPLEX E/M VISIT ADD ON: HCPCS | Performed by: FAMILY MEDICINE

## 2024-04-11 PROCEDURE — 99214 OFFICE O/P EST MOD 30 MIN: CPT | Performed by: FAMILY MEDICINE

## 2024-04-11 RX ORDER — POTASSIUM CHLORIDE 20 MEQ/1
20 TABLET, EXTENDED RELEASE ORAL DAILY
Qty: 3 TABLET | Refills: 0 | Status: SHIPPED | OUTPATIENT
Start: 2024-04-11 | End: 2024-04-14

## 2024-04-11 NOTE — ASSESSMENT & PLAN NOTE
Lab Results   Component Value Date    EGFR 36 (L) 04/10/2024    EGFR 31 (L) 12/12/2023    EGFR 36 (L) 06/06/2023    CREATININE 1.41 (H) 04/10/2024    CREATININE 1.61 (H) 12/12/2023    CREATININE 1.43 (H) 06/06/2023   stable

## 2024-04-11 NOTE — PROGRESS NOTES
Assessment/Plan:    1. Hypotension due to drugs  Assessment & Plan:  Hold bp meds for now  Replace potassium      2. Hypokalemia  -     potassium chloride (Klor-Con M20) 20 mEq tablet; Take 1 tablet (20 mEq total) by mouth daily for 3 days  -     Comprehensive metabolic panel; Future  -     Comprehensive metabolic panel    3. Weight loss, abnormal  -     CT chest abdomen pelvis w contrast; Future; Expected date: 04/11/2024    4. Abnormal LFTs  -     CT chest abdomen pelvis w contrast; Future; Expected date: 04/11/2024    5. Acute hyponatremia  -     Comprehensive metabolic panel; Future  -     Comprehensive metabolic panel  -     CT chest abdomen pelvis w contrast; Future; Expected date: 04/11/2024    6. Aneurysm of ascending aorta without rupture (HCC)  Assessment & Plan:  stable    Orders:  -     CT chest abdomen pelvis w contrast; Future; Expected date: 04/11/2024    7. Stage 3b chronic kidney disease (HCC)  Assessment & Plan:  Lab Results   Component Value Date    EGFR 36 (L) 04/10/2024    EGFR 31 (L) 12/12/2023    EGFR 36 (L) 06/06/2023    CREATININE 1.41 (H) 04/10/2024    CREATININE 1.61 (H) 12/12/2023    CREATININE 1.43 (H) 06/06/2023   stable          Depression Screening and Follow-up Plan: Patient was screened for depression during today's encounter. They screened negative with a PHQ-2 score of 0.         There are no Patient Instructions on file for this visit.    No follow-ups on file.    Subjective:      Patient ID: Damaris Truong is a 88 y.o. female.    Chief Complaint   Patient presents with   • Extremity Weakness     Patient being seen for weakness   • Dizziness     Patient being seen for dizziness        Here with daughter   Concerns of weight loss  134-120 since December  Bp also low  Labs reviewed  No nausea  Some BM daily normal  No blood  Decreased appetite  Smaller portions  Was getting tylenol daily  Fell this week  Feels like she needs to sleep    Extremity Weakness   This is a new problem.  "The current episode started in the past 7 days. The problem occurs constantly. The problem has been unchanged. Pertinent negatives include no inability to bear weight. She has tried acetaminophen for the symptoms.       The following portions of the patient's history were reviewed and updated as appropriate: allergies, current medications, past family history, past medical history, past social history, past surgical history and problem list.    Review of Systems      Current Outpatient Medications   Medication Sig Dispense Refill   • atorvastatin (LIPITOR) 10 mg tablet TAKE 1 TABLET(10 MG) BY MOUTH DAILY 90 tablet 0   • metoprolol tartrate (LOPRESSOR) 50 mg tablet TAKE 1 TABLET(50 MG) BY MOUTH EVERY 12 HOURS 180 tablet 3   • potassium chloride (Klor-Con M20) 20 mEq tablet Take 1 tablet (20 mEq total) by mouth daily for 3 days 3 tablet 0     No current facility-administered medications for this visit.       Objective:    BP (!) 88/66 (BP Location: Left arm, Patient Position: Sitting, Cuff Size: Standard)   Pulse 64   Temp (!) 96.4 °F (35.8 °C) (Tympanic)   Resp 18   Ht 5' 2\" (1.575 m)   Wt 54.7 kg (120 lb 9.6 oz)   SpO2 96%   BMI 22.06 kg/m²        Physical Exam  Vitals and nursing note reviewed.   Constitutional:       Appearance: Normal appearance. She is well-developed.   HENT:      Head: Normocephalic and atraumatic.      Nose: Nose normal.   Eyes:      General: Lids are normal.      Extraocular Movements: Extraocular movements intact.      Conjunctiva/sclera: Conjunctivae normal.      Pupils: Pupils are equal, round, and reactive to light.   Cardiovascular:      Rate and Rhythm: Normal rate and regular rhythm.      Heart sounds: Normal heart sounds, S1 normal and S2 normal.   Pulmonary:      Effort: Pulmonary effort is normal.      Breath sounds: Normal breath sounds.   Abdominal:      General: Abdomen is flat. Bowel sounds are normal.      Palpations: Abdomen is soft.   Musculoskeletal:         General: " Normal range of motion.      Cervical back: Normal range of motion and neck supple.   Skin:     General: Skin is warm and dry.   Neurological:      General: No focal deficit present.      Mental Status: She is alert and oriented to person, place, and time.      Deep Tendon Reflexes: Reflexes are normal and symmetric.   Psychiatric:         Mood and Affect: Mood normal.         Speech: Speech normal.         Behavior: Behavior normal.         Thought Content: Thought content normal.         Judgment: Judgment normal.                Adriana Gresham, DO

## 2024-04-15 LAB
ALBUMIN SERPL-MCNC: 3.7 G/DL (ref 3.6–5.1)
ALBUMIN/GLOB SERPL: 1.1 (CALC) (ref 1–2.5)
ALP SERPL-CCNC: 177 U/L (ref 37–153)
ALT SERPL-CCNC: 28 U/L (ref 6–29)
AST SERPL-CCNC: 27 U/L (ref 10–35)
BILIRUB SERPL-MCNC: 0.5 MG/DL (ref 0.2–1.2)
BUN SERPL-MCNC: 27 MG/DL (ref 7–25)
BUN/CREAT SERPL: 22 (CALC) (ref 6–22)
CALCIUM SERPL-MCNC: 9.8 MG/DL (ref 8.6–10.4)
CHLORIDE SERPL-SCNC: 96 MMOL/L (ref 98–110)
CO2 SERPL-SCNC: 31 MMOL/L (ref 20–32)
CREAT SERPL-MCNC: 1.25 MG/DL (ref 0.6–0.95)
GFR/BSA.PRED SERPLBLD CYS-BASED-ARV: 42 ML/MIN/1.73M2
GLOBULIN SER CALC-MCNC: 3.3 G/DL (CALC) (ref 1.9–3.7)
GLUCOSE SERPL-MCNC: 128 MG/DL (ref 65–139)
POTASSIUM SERPL-SCNC: 4 MMOL/L (ref 3.5–5.3)
PROT SERPL-MCNC: 7 G/DL (ref 6.1–8.1)
SODIUM SERPL-SCNC: 136 MMOL/L (ref 135–146)

## 2024-04-16 ENCOUNTER — OFFICE VISIT (OUTPATIENT)
Dept: FAMILY MEDICINE CLINIC | Facility: CLINIC | Age: 89
End: 2024-04-16
Payer: MEDICARE

## 2024-04-16 VITALS
TEMPERATURE: 96.8 F | BODY MASS INDEX: 22.38 KG/M2 | DIASTOLIC BLOOD PRESSURE: 64 MMHG | RESPIRATION RATE: 16 BRPM | HEART RATE: 66 BPM | OXYGEN SATURATION: 98 % | HEIGHT: 62 IN | WEIGHT: 121.6 LBS | SYSTOLIC BLOOD PRESSURE: 88 MMHG

## 2024-04-16 DIAGNOSIS — R63.4 WEIGHT LOSS, ABNORMAL: ICD-10-CM

## 2024-04-16 DIAGNOSIS — E87.6 HYPOKALEMIA: ICD-10-CM

## 2024-04-16 DIAGNOSIS — I10 ESSENTIAL HYPERTENSION: ICD-10-CM

## 2024-04-16 DIAGNOSIS — I95.2 HYPOTENSION DUE TO DRUGS: Primary | ICD-10-CM

## 2024-04-16 DIAGNOSIS — N18.32 STAGE 3B CHRONIC KIDNEY DISEASE (HCC): ICD-10-CM

## 2024-04-16 PROCEDURE — 99214 OFFICE O/P EST MOD 30 MIN: CPT | Performed by: FAMILY MEDICINE

## 2024-04-16 PROCEDURE — G2211 COMPLEX E/M VISIT ADD ON: HCPCS | Performed by: FAMILY MEDICINE

## 2024-04-16 NOTE — PROGRESS NOTES
Assessment/Plan:    1. Hypotension due to drugs  Assessment & Plan:  Still low  Ryan cut back on lopressor  Seems more alert  Labs improved      2. Essential hypertension  -     metoprolol tartrate (LOPRESSOR) 25 mg tablet; Take 1 tablet (25 mg total) by mouth every 12 (twelve) hours    3. Stage 3b chronic kidney disease (HCC)  Assessment & Plan:  Lab Results   Component Value Date    EGFR 42 (L) 04/15/2024    EGFR 36 (L) 04/10/2024    EGFR 31 (L) 12/12/2023    CREATININE 1.25 (H) 04/15/2024    CREATININE 1.41 (H) 04/10/2024    CREATININE 1.61 (H) 12/12/2023   Improved with d/c of bp med      4. Hypokalemia  Assessment & Plan:  improved      5. Weight loss, abnormal  Assessment & Plan:  Weight stabilized  Eating more              There are no Patient Instructions on file for this visit.    No follow-ups on file.    Subjective:      Patient ID: Damaris Truong is a 88 y.o. female.    Chief Complaint   Patient presents with   • Follow-up     Patient being seen for follow up        Here for follow up  Labs improved  Bp still low  Feeling more energetic  CT scheduled tomorrow morning  Bp still low        The following portions of the patient's history were reviewed and updated as appropriate: allergies, current medications, past family history, past medical history, past social history, past surgical history and problem list.    Review of Systems   Constitutional:  Positive for appetite change (improved slightly).   HENT: Negative.     Eyes: Negative.    Respiratory: Negative.     Cardiovascular: Negative.    Gastrointestinal: Negative.    Musculoskeletal:  Positive for back pain.   Skin:  Positive for rash.   Neurological:  Positive for weakness (improved).         Current Outpatient Medications   Medication Sig Dispense Refill   • atorvastatin (LIPITOR) 10 mg tablet TAKE 1 TABLET(10 MG) BY MOUTH DAILY 90 tablet 0   • metoprolol tartrate (LOPRESSOR) 25 mg tablet Take 1 tablet (25 mg total) by mouth every 12 (twelve)  "hours 60 tablet 1     No current facility-administered medications for this visit.       Objective:    BP (!) 88/64 (BP Location: Left arm, Patient Position: Sitting, Cuff Size: Standard)   Pulse 66   Temp (!) 96.8 °F (36 °C) (Tympanic)   Resp 16   Ht 5' 2\" (1.575 m)   Wt 55.2 kg (121 lb 9.6 oz)   SpO2 98%   BMI 22.24 kg/m²        Physical Exam  Vitals and nursing note reviewed.   Constitutional:       Appearance: She is well-developed.   HENT:      Head: Normocephalic and atraumatic.      Nose: Nose normal.   Eyes:      General: Lids are normal.      Conjunctiva/sclera: Conjunctivae normal.      Pupils: Pupils are equal, round, and reactive to light.   Cardiovascular:      Rate and Rhythm: Normal rate and regular rhythm.      Heart sounds: Normal heart sounds, S1 normal and S2 normal.   Pulmonary:      Effort: Pulmonary effort is normal.      Breath sounds: Normal breath sounds.   Abdominal:      General: Bowel sounds are normal.      Palpations: Abdomen is soft.   Musculoskeletal:         General: Normal range of motion.      Cervical back: Normal range of motion and neck supple.   Skin:     General: Skin is warm and dry.      Findings: Rash (dry skin, scratch marks) present.   Neurological:      Mental Status: She is alert and oriented to person, place, and time.      Deep Tendon Reflexes: Reflexes are normal and symmetric.   Psychiatric:         Speech: Speech normal.         Behavior: Behavior normal.         Thought Content: Thought content normal.         Judgment: Judgment normal.                Adriana Gresham, DO  "

## 2024-04-16 NOTE — ASSESSMENT & PLAN NOTE
Lab Results   Component Value Date    EGFR 42 (L) 04/15/2024    EGFR 36 (L) 04/10/2024    EGFR 31 (L) 12/12/2023    CREATININE 1.25 (H) 04/15/2024    CREATININE 1.41 (H) 04/10/2024    CREATININE 1.61 (H) 12/12/2023   Improved with d/c of bp med

## 2024-04-17 ENCOUNTER — HOSPITAL ENCOUNTER (OUTPATIENT)
Dept: RADIOLOGY | Age: 89
Discharge: HOME | End: 2024-04-17
Payer: MEDICARE

## 2024-04-17 DIAGNOSIS — E87.1 ACUTE HYPONATREMIA: ICD-10-CM

## 2024-04-17 DIAGNOSIS — I71.21 ANEURYSM OF ASCENDING AORTA WITHOUT RUPTURE (HCC): ICD-10-CM

## 2024-04-17 DIAGNOSIS — R79.89 ABNORMAL LFTS: ICD-10-CM

## 2024-04-17 DIAGNOSIS — R63.4 WEIGHT LOSS, ABNORMAL: ICD-10-CM

## 2024-04-17 DIAGNOSIS — I10 ESSENTIAL HYPERTENSION: ICD-10-CM

## 2024-04-17 PROCEDURE — 74177 CT ABD & PELVIS W/CONTRAST: CPT

## 2024-04-17 PROCEDURE — 71260 CT THORAX DX C+: CPT

## 2024-04-17 RX ADMIN — IOHEXOL 100 ML: 350 INJECTION, SOLUTION INTRAVENOUS at 09:21

## 2024-04-18 NOTE — LETTER
August 1, 2018     MD Nemo Haywood [address]    Patient: Tatiana Clay   YOB: 1935   Date of Visit: 8/1/2018       Dear Dr Shruthi Kelly:    Thank you for referring Noahziggy Ines to me for evaluation  Below are my notes for this consultation  If you have questions, please do not hesitate to call me  I look forward to following your patient along with you  Sincerely,        Leonor Hsieh DO        CC: DO [place] CRNP  8/1/2018  8:52 AM  Attested  [facility]  Tatiana Clya 81 y o  female MRN: [id number]      Reason for Consult / Principal Problem: Ascending aortic aneurysm    History of Present Illness: Tatiana Clay is a 81y o  year old female who presents today for ongoing surveillance of an ascending aortic aneurysm  This was initially identified in 2006 at which time measured 47 mm  It has remained stable over the years with only minimal change in size  Her last echocardiogram performed in 2013 identified a trileaflet aortic valve with no valvular dysfunction  Her last visit in aortic clinic was in August 2016 and CT scan demonstrated maximal ascending aortic diameter measured at 49-50 mm  Today [facility] presents for routine 2 year follow up  She states she has been well since her last visit her and denies any change in her health status  She denies chest pain, upper back pain, SOB, lightheadedness, limb pain, weakness or numbness  [facility] also has a known history of left ICA stenosis and left subclavian stenosis with unequal BP's' left arm 400 mm Hg lower than right today  She denies any TIA/CVA symptoms or left arm paresthesias       Past Medical History:  Past Medical History:   Diagnosis Date    Aorta aneurysm ([address] )     Cardiac disease     GERD (gastroesophageal reflux disease)     Hematuria     last assessed: 10/28/2013    Shortness of breath     last assessed: 6/27/2013         Past Surgical History:   Past Surgical History:   Procedure Laterality Date    ADENOIDECTOMY      APPENDECTOMY      CATARACT EXTRACTION      CERVICAL FUSION      HYSTERECTOMY      OTHER SURGICAL HISTORY      paravaginal defect graft-reinforced repair    REPAIR RECTOCELE      TONSILLECTOMY      VEIN LIGATION AND STRIPPING Bilateral          Family History:  Family History   Problem Relation Age of Onset    Diabetes Mother         mellitus    Glaucoma Mother     Hypertension Mother     Macular degeneration Mother     Stroke Father         syndrome    Hypertension Father     Cancer Sister     Dementia Sister     Heart disease Sister          Social History:    History   Alcohol Use No     History   Drug Use No     History   Smoking Status    Former Smoker   Smokeless Tobacco    Never Used         Home Medications:   Prior to Admission medications    Medication Sig Start Date End Date Taking? Authorizing Provider   aspirin 81 MG tablet Take by mouth   Yes Historical Provider, MD   metoprolol tartrate (LOPRESSOR) 50 mg tablet TAKE 2 TABLETS DAILY   7/8/18  Yes Yadiel Aguirre,    omeprazole (PriLOSEC) 20 mg delayed release capsule Take 20 mg by mouth daily   Yes Historical Provider, MD   triamterene-hydrochlorothiazide (MAXZIDE-25) 37 5-25 mg per tablet Take by mouth 9/16/16  Yes Historical Provider, MD       Allergies:  No Known Allergies    Review of Systems:   Review of Systems - History obtained from chart review and the patient  General ROS: negative  Psychological ROS: negative  Ophthalmic ROS: wears glass, no visual disturbances   Hematological and Lymphatic ROS: negative for - bleeding problems, blood clots or bruising  Respiratory ROS: no cough, shortness of breath, or wheezing  Cardiovascular ROS: no chest pain or dyspnea on exertion  Gastrointestinal ROS: no abdominal pain, change in bowel habits, or black or bloody stools  Musculoskeletal ROS: negative  Neurological ROS: no TIA or stroke symptoms    Vital Signs:   Vitals: 08/01/18 0800 08/01/18 0824   BP: 92/74 140/72   BP Location: Left arm Right arm   Patient Position:  Sitting   Cuff Size: Adult Adult   Pulse: 64    Resp: 14    Temp: (!) 97 2 °F (36 2 °C)    TempSrc: Oral    SpO2: 97%    Weight: 68 9 kg (151 lb 14 4 oz)    Height: 5' 4" (1 626 m)        Physical Exam:  General: well developed, no acute distress  HEENT/NECK:  PERRLA  No jugular venous distention  Cardiac:Regular rate and rhythm, No murmurs, rubs or gallops  Carotid arteries: 1+ pulses, faint bruit on left  Pulmonary:  Breath sounds clear bilaterally  Abdomen:  Non-tender, Non-distended  Positive bowel sounds  Upper extremities: 2+ radial pulses; brisk capillary refill  Lower extremities: Extremities warm/dry  PT/DP pulses 2+ bilaterally  No edema B/L  Neuro: Alert and oriented X 3  Sensation is grossly intact  No focal deficits  Musculoskeletal: MAEE, stable gait  Skin: Warm/Dry, without rashes or lesions  Lab Results:   Lab Results   Component Value Date    CHOL 176 09/01/2017    CHOL 174 03/01/2017    CHOL 172 09/01/2016     Lab Results   Component Value Date    HDL 58 04/03/2018    HDL 54 09/01/2017    HDL 54 03/01/2017     No results found for: [facility]  Lab Results   Component Value Date    TRIG 96 04/03/2018    TRIG 80 09/01/2017    TRIG 92 03/01/2017     No components found for: CHOLHDL      Lab Results   Component Value Date    HGBA1C 6 2 (H) 04/03/2018     No results found for: CKTOTAL, CKMB, CKMBINDEX, TROPONINI    Imaging Studies:     CT Chest:   No change in 49 mm ascending aortic aneurysm    Echocardiogram: 2013  Trileaflet aortic valve, no AS/AI    I have personally reviewed pertinent reports        Assessment:  Patient Active Problem List    Diagnosis Date Noted    Bronchitis 07/02/2018    CAD in native artery 04/05/2018    Thoracic aortic aneurysm without rupture [address] [address] ) 04/05/2018    Hypertension 04/05/2018    Carotid artery obstruction, left 04/05/2018    Shoulder impingement syndrome, right 02/19/2018    Arteriosclerosis of carotid artery 05/21/2015    Carotid atherosclerosis 10/23/2014    Chronic kidney disease, stage 3 05/01/2014    Hyperlipidemia 04/24/2014    Aneurysm of ascending aorta (HCC) 10/24/2013    Impaired fasting glucose 02/27/2013    GERD without esophagitis 02/01/2013    Auditory vertigo 02/01/2013    Polyp of sigmoid colon 02/01/2013     Stable ascending aortic aneurysm    Plan:    CT imaging performed prior to this visit demonstrates the ascending aorta measuring 49 mm in size at its greatest diameter  Surgery is not indicated  Imaging over the past 12 years as demonstrated stability and aortic calcification  In light of these findings and her age, we recommend no further routine imaging or surveillance in aortic clinic  Tasha Latham was comfortable with our recommendations, and her questions were answered to her satisfaction  Thank you for allowing us to participate in the care of this patient  RAMIN Rowe  DATE: August 1, 2018  TIME: 8:38 AM  Attestation signed by Omayra Patterson DO at 8/1/2018  8:56 AM:  The patient was seen and examined, and I agree with the midlevel's history, physical exam, assessment and plan with the following additions:    Her CT scan is stable and unchanged  This is essentially unchanged since the beginning of following her aorta in 2006  There is aortic ascending calcification present as well  My recommendation to proceed is on as needed basis  I do not feel she requires any further ongoing CT scans  If she has an abnormal CT scan finding in the future, she will refer herself back to our aortic Clinic  She will be seen on as needed basis  Patient her  agree with this treatment plan  [facility]

## 2024-04-22 NOTE — PROGRESS NOTES
Assessment:  1. Lumbar radiculopathy    2. Spinal stenosis of lumbar region with neurogenic claudication        Plan:  We can repeat we can repeat left L4 and L5 TFESI as needed.  I discussed with the patient that since there has been moderate to significant improvement in the pain symptoms, we will hold off on any repeat injections at this point in time. However, I reviewed with the patient that if their symptoms should return or worsen,  they should call our office to schedule to discuss repeating the injection.  Continue with home exercise program  Patient was advised to avoid Tylenol products per PCP secondary to elevation in LFTs.  Patient also to avoid NSAIDs secondary to CKD  Follow-up as needed at this time    History of Present Illness:    The patient is a 88 y.o. female with a history of CAD, CKD, thoracic aortic aneurysm and hypertension last seen on 3/1/2024 who presents for a follow up office visit in regards to chronic left-sided low back pain that will radiate into the lateral aspect of the left lower extremity to the ankle with associated paresthesias.  She denies right-sided symptoms, bowel or bladder incontinence or saddle anesthesia.  Patient is status post left L4 and L5 TFESI March 19, 2024 with resolution of her pain which is ongoing at this time.  She currently rates her pain a 0 out of 10 on the numeric pain rating scale    I have personally reviewed and/or updated the patient's past medical history, past surgical history, family history, social history, current medications, allergies, and vital signs today.       Review of Systems:    Review of Systems      Past Medical History:   Diagnosis Date    Aorta aneurysm (HCC)     Basal cell carcinoma 06/14/2023    Left nasal tip    Cancer (HCC) 2023    BCC on nose    Cardiac disease     Fall 05/23/2019    GERD (gastroesophageal reflux disease)     Hematuria     last assessed: 10/28/2013    Hip pain, acute, left 05/23/2019    HL (hearing loss)      Memory loss     Shortness of breath     last assessed: 6/27/2013    Urinary tract infection several times       Past Surgical History:   Procedure Laterality Date    ADENOIDECTOMY      APPENDECTOMY      BREAST BIOPSY Left     BREAST LUMPECTOMY Left     CATARACT EXTRACTION      CERVICAL FUSION      HYSTERECTOMY      age 51    MOHS SURGERY Left 06/14/2023    BCC- Left nasal tip    OOPHORECTOMY      OTHER SURGICAL HISTORY      paravaginal defect graft-reinforced repair    REPAIR RECTOCELE      TONSILLECTOMY      VEIN LIGATION AND STRIPPING Bilateral        Family History   Problem Relation Age of Onset    Diabetes Mother         mellitus    Hypertension Mother         daughter Nereida doesn't know    Stroke Father         syndrome    Hypertension Father         daughter Nereida doesn't know    Cancer Sister         breast cancer    Dementia Sister     Heart disease Sister     Breast cancer Sister     Dementia Sister     COPD Sister     Dementia Sister     Coronary artery disease Sister     Coronary artery disease Sister     Dementia Sister     Dementia Sister     Breast cancer Sister 40    No Known Problems Sister     No Known Problems Daughter     No Known Problems Daughter     No Known Problems Maternal Grandmother     No Known Problems Maternal Grandfather     No Known Problems Paternal Grandmother     No Known Problems Paternal Grandfather     Cancer Maternal Aunt         daughter Nereida doesn't know    Cancer Maternal Aunt         daughter Nereida doesn't know    Cancer Maternal Aunt         daughter Nereida doesn't know    No Known Problems Maternal Aunt     No Known Problems Maternal Aunt     No Known Problems Maternal Aunt     No Known Problems Paternal Aunt     No Known Problems Paternal Aunt     No Known Problems Paternal Aunt     No Known Problems Paternal Aunt     No Known Problems Paternal Aunt        Social History     Occupational History    Not on file   Tobacco Use    Smoking status: Former      "Current packs/day: 0.00     Types: Cigarettes     Quit date:      Years since quittin.3     Passive exposure: Past    Smokeless tobacco: Never   Vaping Use    Vaping status: Never Used   Substance and Sexual Activity    Alcohol use: No    Drug use: No    Sexual activity: Not Currently     Partners: Male         Current Outpatient Medications:     atorvastatin (LIPITOR) 10 mg tablet, TAKE 1 TABLET(10 MG) BY MOUTH DAILY, Disp: 90 tablet, Rfl: 0    metoprolol tartrate (LOPRESSOR) 25 mg tablet, Take 1 tablet (25 mg total) by mouth every 12 (twelve) hours, Disp: 180 tablet, Rfl: 1    No Known Allergies    Physical Exam:    BP (!) 86/65   Pulse 65   Ht 5' 2\" (1.575 m)   Wt 56.2 kg (124 lb)   BMI 22.68 kg/m²     Constitutional:normal, well developed, well nourished, alert, in no distress and non-toxic and no overt pain behavior.  Eyes:anicteric  HEENT:grossly intact  Neck:supple, symmetric, trachea midline and no masses   Pulmonary:even and unlabored  Cardiovascular:No edema or pitting edema present  Skin:Normal without rashes or lesions and well hydrated  Psychiatric:Mood and affect appropriate  Neurologic:Cranial Nerves II-XII grossly intact  Musculoskeletal:antalgic and ambulates with cane      Imaging  No orders to display         No orders of the defined types were placed in this encounter.      "

## 2024-04-23 ENCOUNTER — DOCUMENTATION (OUTPATIENT)
Dept: FAMILY MEDICINE CLINIC | Facility: CLINIC | Age: 89
End: 2024-04-23

## 2024-04-23 ENCOUNTER — OFFICE VISIT (OUTPATIENT)
Dept: PAIN MEDICINE | Facility: CLINIC | Age: 89
End: 2024-04-23
Payer: MEDICARE

## 2024-04-23 VITALS
SYSTOLIC BLOOD PRESSURE: 86 MMHG | HEIGHT: 62 IN | WEIGHT: 124 LBS | HEART RATE: 65 BPM | BODY MASS INDEX: 22.82 KG/M2 | DIASTOLIC BLOOD PRESSURE: 65 MMHG

## 2024-04-23 DIAGNOSIS — M54.16 LUMBAR RADICULOPATHY: Primary | ICD-10-CM

## 2024-04-23 DIAGNOSIS — M48.062 SPINAL STENOSIS OF LUMBAR REGION WITH NEUROGENIC CLAUDICATION: ICD-10-CM

## 2024-04-23 DIAGNOSIS — R93.89 ABNORMAL CT OF THE CHEST: Primary | ICD-10-CM

## 2024-04-23 DIAGNOSIS — R63.4 WEIGHT LOSS, ABNORMAL: ICD-10-CM

## 2024-04-23 PROCEDURE — 99213 OFFICE O/P EST LOW 20 MIN: CPT | Performed by: NURSE PRACTITIONER

## 2024-04-24 ENCOUNTER — DOCUMENTATION (OUTPATIENT)
Dept: HEMATOLOGY ONCOLOGY | Facility: CLINIC | Age: 89
End: 2024-04-24

## 2024-04-24 ENCOUNTER — TELEPHONE (OUTPATIENT)
Dept: HEMATOLOGY ONCOLOGY | Facility: CLINIC | Age: 89
End: 2024-04-24

## 2024-04-24 NOTE — PROGRESS NOTES
What is the reason for Medical Oncology consult:  Suspected lymphoma identified on imaging.    Does the patient qualify for an urgent new patient visit?  No    Schedule within: 7 days       Intake received/ Chart reviewed for work up completed     Imaging completed: 4/17/24 CT c/a/p    Pathology completed:  N/A    All records needed are in patients chart. No records retrieval needed at this time.    Routing to [facility] for scheduling of:  -Medical Oncology consult within 7 days

## 2024-04-24 NOTE — TELEPHONE ENCOUNTER
I called Nereida/Damaris in response to a referral that was received for patient to establish care with Medical Oncology.     Outreach was made to schedule a consultation.    A consultation was scheduled for patient during this call. Patient is scheduled on 5/2/24 at 1:00pm with Dr Carr at the [facility]      Nereida requested for patient to be seen by Dr Carr at the [place] office.  Per clinical review, patient should be seen within 7 days.

## 2024-05-02 ENCOUNTER — TELEPHONE (OUTPATIENT)
Dept: HEMATOLOGY ONCOLOGY | Facility: CLINIC | Age: 89
End: 2024-05-02

## 2024-05-02 ENCOUNTER — OFFICE VISIT (OUTPATIENT)
Dept: HEMATOLOGY ONCOLOGY | Facility: CLINIC | Age: 89
End: 2024-05-02
Payer: MEDICARE

## 2024-05-02 ENCOUNTER — DOCUMENTATION (OUTPATIENT)
Dept: HEMATOLOGY ONCOLOGY | Facility: CLINIC | Age: 89
End: 2024-05-02

## 2024-05-02 VITALS
SYSTOLIC BLOOD PRESSURE: 122 MMHG | HEIGHT: 62 IN | TEMPERATURE: 97.7 F | RESPIRATION RATE: 17 BRPM | OXYGEN SATURATION: 97 % | BODY MASS INDEX: 22.08 KG/M2 | DIASTOLIC BLOOD PRESSURE: 62 MMHG | WEIGHT: 120 LBS | HEART RATE: 61 BPM

## 2024-05-02 DIAGNOSIS — Z80.9 FAMILY HISTORY OF CANCER: ICD-10-CM

## 2024-05-02 DIAGNOSIS — R93.89 ABNORMAL CT OF THE CHEST: ICD-10-CM

## 2024-05-02 DIAGNOSIS — R63.4 WEIGHT LOSS, ABNORMAL: ICD-10-CM

## 2024-05-02 DIAGNOSIS — R59.0 MEDIASTINAL LYMPHADENOPATHY: Primary | ICD-10-CM

## 2024-05-02 DIAGNOSIS — R59.0 HILAR ADENOPATHY: ICD-10-CM

## 2024-05-02 PROCEDURE — 99204 OFFICE O/P NEW MOD 45 MIN: CPT | Performed by: INTERNAL MEDICINE

## 2024-05-02 NOTE — TELEPHONE ENCOUNTER
I called Damaris in response to a referral that was received for patient to establish care with Thoracic Surgery.     Outreach was made to schedule a consultation.    A consultation was scheduled for patient during this call. Patient is scheduled on 5/6/24 at 3 with Deja at the [facility]

## 2024-05-02 NOTE — PATIENT INSTRUCTIONS
Please get bloodwork at your convenience. Please followup with CT surgery team and follow-up with us in 3 weeks.

## 2024-05-02 NOTE — PROGRESS NOTES
Oncology Outpatient Consult Note  Damaris Truong 88 y.o. female MRN: @ Encounter: [id number]    Date:  5/2/2024    Assessment/ Plan:      1. Mediastinal lymphadenopathy  CBC and differential    Comprehensive metabolic panel    LD,Blood    Uric acid    Ambulatory Referral to Thoracic Surgery      2. Hilar adenopathy  CBC and differential    Comprehensive metabolic panel    LD,Blood    Uric acid    Ambulatory Referral to Thoracic Surgery      3. Abnormal CT of the chest  Ambulatory Referral to Hematology / Oncology      4. Weight loss, abnormal  Ambulatory Referral to Hematology / Oncology      5. Family history of cancer  Ambulatory Referral to Oncology Genetics        Mrs. Truong is an 88 year old female with hx of aortic aneurysm, basal cell carcinoma  (s/p Moh's surgery), and tobacco abuse (smoked 5-10 cigarettes daily for about 25-30 years, quit in 1990s) who was referred to medical oncology team after CT CAP that was ordered by PCP for workup of ~15 lb unintentional weight loss and generalized fatigue/weakness showed numerous pathologically enlarged mediastinal and b/l hilar adenopathy, concerning for lymphoma although metastatic disease from unknown primary also remains in the ddx.     Clinically, patient has been doing ok overall without any palpable lymphadenopathy, fevers, or night sweats. She is independent of all her ADLs and continues to drive also. She does have memory issues, especially with recalling short-term events. She uses a cane for ambulatory support/balance.    We explained that although there is suspicion for malignancy, we will need to tissue biopsy to confirm whether we are definitively dealing with malignancy. Also if this is confirmed to be malignancy, we will need to know what type of malignancy to further guide treatment decisions and recommendations.  Patient relayed that she is interested in pursuing the biopsy to determine the diagnosis. CT CAP was primarily reporting enlarged  mediastinal and hilar adenopathy, so we will request for a referral to CT surgery team to see if they would be able to perform a biopsy safely to give us a diagnosis. Additionally, we will order some basic labs such as CMP, CBC, LD, and uric acid. We will request for a follow-up in office in about 3 weeks; hopefully we will have a biopsy proven diagnosis to review by that time. Patient's daughter was asked to inform us if there will be any significant delay in the pursual of biopsy so that we can reschedule the follow-up. Patient's family history was notable for colon CA in maternal niece, endometrial cancer in one of pt's daughter, and breast cancer in one of her sisters. We will refer patient to genetics team to see if she would be candidate to have genetic testing done.     Patient and her daughter were in agreement with the plan as noted above.  They know to call the office with any questions or concerns.    Summary of recommendations:   - CT surgery referral to consider biopsy of the pathological lymph nodes reported on CT  - Check CBC, CMP, LD, and uric acid   - Referral to genetics team to consider genetic testing in the patient  - F/u in office in about 3 weeks to review biopsy results (may need to be rescheduled depending on the actual biopsy date)      CC:  pathologically enlarged mediastinal and bilateral hilar adenopathy, concerning for lymphoma      HPI:  Damaris Truong is an 88 year old female with hx of aortic aneurysm (stable) and basal cell carcinoma involving her nose (s/p Moh's surgery in April 2023). Patient with progressively worsening generalized weakness/fatigue and 13-15 lb weight loss over last 5 months. PCP recommended CT CAP, which was pursued on 4/17/24; it revealed numerous pathologically enlarged mediastinal and b/l hilar adenopathy, concerning for lymphoma although metastatic disease from unknown primary also remains in the ddx.     Given concern for malignancy, patient's PCP referred  her to medical oncology team for further workup and recommendations. Patient presented to the office today with her daughter Silke. Clinically, patient has been doing ok overall without any palpable lymphadenopathy, fevers, or night sweats. Patient and her  live together in a home. She is independent of all her ADLs and continues to drive also. She does have memory issues, especially with recalling short-term events. Uses a cane for ambulatory support/balance.      Past medical history: basal cell carcinoma involving nose, aortic aneurysm     Past surgical history: total abdominal hysterectomy (in 1980s due to heavy menstrual cycles), appendectomy, cervical fusion, Moh's surgery    Social history: patient smoked for between 25-30 years (between 5-10 cigs daily, quit in 1990s. Denied any heavy EtOH or illicit drug use. She used to work for the [employer] in the [employer] department, after which she used to work as a  for other companies    Family history: colon cancer in maternal niece, breast cancer in elder sister, endometrial cancer in 1 daughter.       Molecular Testing:     Previous Hematologic/ Oncologic History:    Basal cell carcinoma involving the nose      Test Results:    Imaging: .CT chest abdomen pelvis w contrast    Result Date: 4/23/2024  Narrative: CT CHEST, ABDOMEN AND PELVIS WITH IV CONTRAST INDICATION: R63.4: Abnormal weight loss R79.89: Other specified abnormal findings of blood chemistry E87.1: Hypo-osmolality and hyponatremia I71.21: Aneurysm of the ascending aorta, without rupture. COMPARISON: Chest CT July 23, 2018 TECHNIQUE: CT examination of the chest, abdomen and pelvis was performed. Multiplanar 2D reformatted images were created from the source data. This examination, like all CT scans performed in the [facility], was performed utilizing techniques to minimize radiation dose exposure, including the use of iterative reconstruction and  automated exposure control. Radiation dose length product (DLP) for this visit: 843 mGy-cm IV Contrast: 100 mL of iohexol (OMNIPAQUE) Enteric Contrast: Not administered. FINDINGS: CHEST LUNGS: Mild centrilobular emphysematous changes. Stable biapical pleural-parenchymal scarring. No infiltrates or suspicious masses. No tracheal or endobronchial lesion. PLEURA: Unremarkable. HEART/GREAT VESSELS: Heart is unremarkable for patient's age. No thoracic Fusiform aneurysmal enlargement of the ascending thoracic aorta measuring up to 49 mm.. Stable compared to prior MEDIASTINUM AND NEEMA: Numerous pathologically enlarged mediastinal and bilateral hilar lymph nodes. The bulkiest adenopathy is in the lower mediastinum, subcarinal nodes, with largest measuring 2.9 cm short axis dimension. Left infrahilar node 1.9 cm. AP  window node 1.8 cm. Superior mediastinum right paratracheal node 1.1 cm. CHEST WALL AND LOWER NECK: Unremarkable. ABDOMEN LIVER/BILIARY TREE: Unremarkable. GALLBLADDER: Cholelithiasis without findings of acute cholecystitis. SPLEEN: Unremarkable. PANCREAS: Unremarkable. ADRENAL GLANDS: Unremarkable. KIDNEYS/URETERS: Stable small cortical cysts.. No hydronephrosis. STOMACH AND BOWEL: Unremarkable. APPENDIX: No findings to suggest appendicitis. ABDOMINOPELVIC CAVITY: No ascites. No pneumoperitoneum. No lymphadenopathy. VESSELS: Unremarkable for patient's age. PELVIS REPRODUCTIVE ORGANS: Post hysterectomy. URINARY BLADDER: Unremarkable. ABDOMINAL WALL/INGUINAL REGIONS: Unremarkable. BONES: No acute fracture or suspicious osseous lesion.     Impression: Numerous pathologically enlarged mediastinal and bilateral hilar adenopathy. Suggest lymphoma. Metastatic disease from unknown primary remains in the differential. Mild emphysematous changes and stable biapical pleural-parenchymal scarring. Stable a sending aortic aneurysm of 4.9 cm. Cholelithiasis without evidence of cholecystitis. The study was marked in EPIC for  significant notification. Findings also sent via Tiger text to Dr. Adriana Gresham. Workstation performed: [id number]       Labs:   Lab Results   Component Value Date    WBC 4.6 04/10/2024    HGB 12.1 04/10/2024    HCT 35.6 04/10/2024    MCV 88.8 04/10/2024     04/10/2024     Lab Results   Component Value Date     09/01/2017    K 4.0 04/15/2024    CL 96 (L) 04/15/2024    CO2 31 04/15/2024    BUN 27 (H) 04/15/2024    CREATININE 1.25 (H) 04/15/2024    CALCIUM 9.8 04/15/2024    CORRECTEDCA 9.9 04/03/2018    AST 27 04/15/2024    ALT 28 04/15/2024    ALKPHOS 177 (H) 04/15/2024    PROT 7.4 09/01/2017    BILITOT 0.6 09/01/2017    EGFR 42 (L) 04/15/2024       ROS: Review of Systems   Constitutional:  Positive for appetite change (decreased) and unexpected weight change (13-15 lb weight loss x 5 months). Negative for chills, fatigue and fever.   HENT:   Negative for hearing loss, mouth sores, nosebleeds, sore throat, tinnitus and trouble swallowing.    Eyes:  Negative for eye problems and icterus.   Respiratory:  Positive for cough (dry cough). Negative for chest tightness, shortness of breath and wheezing.    Cardiovascular:  Negative for chest pain and palpitations.   Gastrointestinal:  Negative for abdominal pain, blood in stool, constipation, diarrhea, nausea and vomiting.   Genitourinary:  Negative for dysuria, frequency and hematuria.    Musculoskeletal:  Positive for gait problem (uses a cane for support). Negative for back pain, flank pain, myalgias and neck pain.   Skin:  Negative for itching and rash.   Neurological:  Positive for gait problem (uses a cane for support). Negative for dizziness, headaches, light-headedness and speech difficulty.   Hematological:  Does not bruise/bleed easily.   Psychiatric/Behavioral:  Negative for confusion. The patient is not nervous/anxious.         Active Problems:   Patient Active Problem List   Diagnosis    Coronary artery disease involving native heart without  angina pectoris    Thoracic aortic aneurysm without rupture (HCC)    Hypertension    Carotid artery obstruction, left    Aneurysm of ascending aorta (HCC)    Arteriosclerosis of carotid artery    Carotid atherosclerosis    Stage 3b chronic kidney disease (HCC)    GERD without esophagitis    Hyperlipidemia    Impaired fasting glucose    Polyp of sigmoid colon    Chronic left-sided low back pain without sciatica    Nasal lesion    Memory impairment    Chronic left shoulder pain    Rotator cuff tendonitis, left    Subacromial bursitis of left shoulder joint    Spinal stenosis of lumbar region with neurogenic claudication    Low back pain with sciatica    Lumbar radiculopathy    Hypotension due to drugs    Hypokalemia    Weight loss, abnormal    Abnormal LFTs       Past Medical History:   Past Medical History:   Diagnosis Date    Aorta aneurysm (HCC)     Basal cell carcinoma 06/14/2023    Left nasal tip    GERD (gastroesophageal reflux disease)     HL (hearing loss)     Memory loss        Surgical History:   Past Surgical History:   Procedure Laterality Date    ADENOIDECTOMY      BREAST BIOPSY Left     BREAST LUMPECTOMY Left     CATARACT EXTRACTION      CERVICAL FUSION      HYSTERECTOMY      age 51    MOHS SURGERY Left 06/14/2023    BCC- Left nasal tip    OOPHORECTOMY      OTHER SURGICAL HISTORY      paravaginal defect graft-reinforced repair    REPAIR RECTOCELE      TONSILLECTOMY      VEIN LIGATION AND STRIPPING Bilateral        Family History:    Family History   Problem Relation Age of Onset    Diabetes Mother         mellitus    Hypertension Mother         daughter Nereida doesn't know    Stroke Father         syndrome    Hypertension Father         daughter Nereida doesn't know    Cancer Sister         breast cancer    Dementia Sister     Heart disease Sister     Breast cancer Sister     Dementia Sister     COPD Sister     Dementia Sister     Coronary artery disease Sister     Coronary artery disease Sister      Dementia Sister     Dementia Sister     Breast cancer Sister 40    No Known Problems Sister     No Known Problems Maternal Grandmother     No Known Problems Maternal Grandfather     No Known Problems Paternal Grandmother     No Known Problems Paternal Grandfather     Endometrial cancer Daughter     No Known Problems Daughter     No Known Problems Maternal Aunt     No Known Problems Maternal Aunt     No Known Problems Maternal Aunt     No Known Problems Paternal Aunt     No Known Problems Paternal Aunt     No Known Problems Paternal Aunt     No Known Problems Paternal Aunt     No Known Problems Paternal Aunt     Colon cancer Niece        Cancer-related family history includes Breast cancer in her sister; Breast cancer (age of onset: 40) in her sister; Cancer in her sister; Colon cancer in her niece; Endometrial cancer in her daughter.    Social History:   Social History     Socioeconomic History    Marital status: /Civil Union     Spouse name: Not on file    Number of children: Not on file    Years of education: Not on file    Highest education level: Not on file   Occupational History    Not on file   Tobacco Use    Smoking status: Former     Current packs/day: 0.00     Types: Cigarettes     Quit date:      Years since quittin.3     Passive exposure: Past    Smokeless tobacco: Never   Vaping Use    Vaping status: Never Used   Substance and Sexual Activity    Alcohol use: No    Drug use: No    Sexual activity: Not Currently     Partners: Male   Other Topics Concern    Not on file   Social History Narrative    Daily coffee consumption (1 cups/day)     Social Determinants of Health     Financial Resource Strain: Low Risk  (2022)    Overall Financial Resource Strain (CARDIA)     Difficulty of Paying Living Expenses: Not hard at all   Food Insecurity: Not on file   Transportation Needs: No Transportation Needs (2022)    PRAPARE - Transportation     Lack of Transportation (Medical): No     Lack  of Transportation (Non-Medical): No   Physical Activity: Not on file   Stress: Not on file   Social Connections: Not on file   Intimate Partner Violence: Not on file   Housing Stability: Not on file       Current Medications:   Current Outpatient Medications   Medication Sig Dispense Refill    atorvastatin (LIPITOR) 10 mg tablet TAKE 1 TABLET(10 MG) BY MOUTH DAILY 90 tablet 0    metoprolol tartrate (LOPRESSOR) 25 mg tablet Take 1 tablet (25 mg total) by mouth every 12 (twelve) hours 180 tablet 1     No current facility-administered medications for this visit.       Allergies: No Known Allergies      Physical Exam:    Body surface area is 1.54 meters squared.    Wt Readings from Last 3 Encounters:   05/02/24 54.4 kg (120 lb)   04/23/24 56.2 kg (124 lb)   04/16/24 55.2 kg (121 lb 9.6 oz)        Temp Readings from Last 3 Encounters:   05/02/24 97.7 °F (36.5 °C)   04/16/24 (!) 96.8 °F (36 °C) (Tympanic)   04/11/24 (!) 96.4 °F (35.8 °C) (Tympanic)        BP Readings from Last 3 Encounters:   05/02/24 122/62   04/23/24 (!) 86/65   04/16/24 (!) 88/64         Pulse Readings from Last 3 Encounters:   05/02/24 61   04/23/24 65   04/16/24 66     @LASTSAO2(3)@    Physical Exam  Vitals reviewed.   Constitutional:       General: She is not in acute distress.     Appearance: She is not ill-appearing, toxic-appearing or diaphoretic.   HENT:      Head: Normocephalic and atraumatic.      Ears:      Comments: Very hard of hearing  Eyes:      Extraocular Movements: Extraocular movements intact.      Conjunctiva/sclera: Conjunctivae normal.   Cardiovascular:      Rate and Rhythm: Normal rate and regular rhythm.      Heart sounds: No murmur heard.     No gallop.   Pulmonary:      Effort: No respiratory distress.      Breath sounds: Normal breath sounds. No wheezing, rhonchi or rales.   Abdominal:      General: Bowel sounds are normal. There is no distension.      Palpations: Abdomen is soft. There is no mass.   Musculoskeletal:          General: No swelling, tenderness or deformity.      Cervical back: Normal range of motion.      Right lower leg: No edema.      Left lower leg: No edema.   Skin:     General: Skin is warm and dry.      Findings: No erythema, lesion or rash.   Neurological:      General: No focal deficit present.      Mental Status: She is alert and oriented to person, place, and time.   Psychiatric:         Mood and Affect: Mood normal.         Judgment: Judgment normal.

## 2024-05-02 NOTE — PROGRESS NOTES
What is the reason for Thoracic Surgery visit? Mediastinal lymphadenopathy, hilar lymphadenopathy    Does the patient qualify for an urgent new patient visit? no    Schedule within: 3 days     Other scheduling guidelines or patient instructions: N/A    Intake received/ Chart reviewed for work up completed     Imaging completed: 04/17/24 CT CAP w contrast      Pathology completed: 04/06/23 BCC - Left Nasal    All records needed are in patients chart. No records retrieval needed at this time.    Routing to [facility] for scheduling of Thoracic Surgery consult.

## 2024-05-05 NOTE — H&P (VIEW-ONLY)
Thoracic Consult  Assessment/Plan:  88yF w/ lymphadenopathy. Discussed findings with patient and her daughter. She has had a cervical fusion which does limit neck extension and may make mediastinoscopy difficult/impossible.     Will proceed with EBUS since she considers acting on the results. Will send for Flow in addition to cell block.     Discussed risks of the procedure being limited and small but do include bleeding. Consent was obtained today. There is the chance that biopsy with EBUS do not provide a diagnosis in which case we would arrange for her to follow-up for an attempted mediastinoscopy. I am still optimistic she has the neck extension for this.     Krishna Short, DO  Thoracic Surgery       Diagnoses and all orders for this visit:    Mediastinal lymphadenopathy  -     Ambulatory Referral to Thoracic Surgery  -     Case request operating room: ENDOBRONCHIAL ULTRASOUND (EBUS); Standing  -     Case request operating room: ENDOBRONCHIAL ULTRASOUND (EBUS)    Hilar adenopathy  -     Ambulatory Referral to Thoracic Surgery    Other orders  -     Diet NPO; Sips with meds; Standing  -     Void on call to OR; Standing  -     Insert peripheral IV; Standing  -     Place sequential compression device; Standing          Thoracic History   Diagnosis: Mediastinal lymphadenopathy   Procedures/Surgeries:    Pathology:    Adjuvant Therapy:       Subjective:    Patient ID: Damaris Truong is a 88 y.o. female who presents with lymphadenopathy. Her medical history includes basal cell carcinoma s/p resection, thoracic aneurysm, CAD, HTN, back pain, CKD, cervical fusion. She was referred from [facility]. The patient has lost 15 lbs in the last few months. CT was done showing lymphadenopathy in the mediastinum. She is a former smoker but quit in 1980s. She denies fevers, chills, night sweats.     Data:  The following results contain my personal interpretation and summarization.   CTCAP (4/17/24): Enlarged mediastinal and hilar  lymph nodes. Includes 4R, 4L, 7, 11R and 11L. Stable aneurysm of ascending thoracic aorta. No lung masses. No obvious abdominal lymphadenopathy.       The following portions of the patient's history were reviewed and updated as appropriate: allergies, current medications, past family history, past medical history, past social history, past surgical history, and problem list.    Past Medical History:   Diagnosis Date   • Aorta aneurysm (HCC)    • Basal cell carcinoma 06/14/2023    Left nasal tip   • GERD (gastroesophageal reflux disease)    • HL (hearing loss)    • Memory loss       Past Surgical History:   Procedure Laterality Date   • ADENOIDECTOMY     • BREAST BIOPSY Left    • BREAST LUMPECTOMY Left    • CATARACT EXTRACTION     • CERVICAL FUSION     • HYSTERECTOMY      age 51   • MOHS SURGERY Left 06/14/2023    BCC- Left nasal tip   • OOPHORECTOMY     • OTHER SURGICAL HISTORY      paravaginal defect graft-reinforced repair   • REPAIR RECTOCELE     • TONSILLECTOMY     • VEIN LIGATION AND STRIPPING Bilateral       Family History   Problem Relation Age of Onset   • Diabetes Mother         mellitus   • Hypertension Mother         daughter Nereida doesn't know   • Stroke Father         syndrome   • Hypertension Father         daughter Nereida doesn't know   • Dementia Sister    • Heart disease Sister    • Breast cancer Sister    • Dementia Sister    • COPD Sister    • Dementia Sister    • Coronary artery disease Sister    • Coronary artery disease Sister    • Dementia Sister    • Dementia Sister    • Breast cancer Sister 40   • No Known Problems Sister    • No Known Problems Maternal Aunt    • No Known Problems Maternal Aunt    • No Known Problems Maternal Aunt    • No Known Problems Paternal Aunt    • No Known Problems Paternal Aunt    • No Known Problems Paternal Aunt    • No Known Problems Paternal Aunt    • No Known Problems Paternal Aunt    • No Known Problems Maternal Grandmother    • No Known Problems Maternal  Grandfather    • No Known Problems Paternal Grandmother    • No Known Problems Paternal Grandfather    • Endometrial cancer Daughter    • No Known Problems Daughter    • Colon cancer Niece       Social History     Socioeconomic History   • Marital status: /Civil Union     Spouse name: Not on file   • Number of children: Not on file   • Years of education: Not on file   • Highest education level: Not on file   Occupational History   • Not on file   Tobacco Use   • Smoking status: Former     Current packs/day: 0.00     Types: Cigarettes     Quit date:      Years since quittin.3     Passive exposure: Past   • Smokeless tobacco: Never   Vaping Use   • Vaping status: Never Used   Substance and Sexual Activity   • Alcohol use: No   • Drug use: No   • Sexual activity: Not Currently     Partners: Male   Other Topics Concern   • Not on file   Social History Narrative    Daily coffee consumption (1 cups/day)     Social Determinants of Health     Financial Resource Strain: Low Risk  (2022)    Overall Financial Resource Strain (CARDIA)    • Difficulty of Paying Living Expenses: Not hard at all   Food Insecurity: Not on file   Transportation Needs: No Transportation Needs (2022)    PRAPARE - Transportation    • Lack of Transportation (Medical): No    • Lack of Transportation (Non-Medical): No   Physical Activity: Not on file   Stress: Not on file   Social Connections: Not on file   Intimate Partner Violence: Not on file   Housing Stability: Not on file      Review of Systems   Constitutional:  Positive for unexpected weight change. Negative for activity change, fatigue and fever.   HENT:  Negative for trouble swallowing and voice change.    Eyes:  Negative for photophobia and visual disturbance.   Respiratory:  Negative for chest tightness, shortness of breath and wheezing.    Cardiovascular:  Negative for chest pain and palpitations.   Gastrointestinal:  Negative for abdominal pain, nausea and  "vomiting.   Genitourinary:  Negative for dysuria and flank pain.   Musculoskeletal:  Positive for neck stiffness. Negative for back pain and neck pain.   Skin:  Negative for rash and wound.   Neurological:  Negative for dizziness, weakness, light-headedness and headaches.   All other systems reviewed and are negative.        Objective:   Physical Exam  Vitals reviewed.   Constitutional:       Appearance: Normal appearance. She is normal weight.   HENT:      Head: Normocephalic and atraumatic.   Eyes:      Extraocular Movements: Extraocular movements intact.      Pupils: Pupils are equal, round, and reactive to light.   Neck:      Comments: Some limit of neck extension.   Cardiovascular:      Rate and Rhythm: Normal rate and regular rhythm.      Pulses: Normal pulses.      Heart sounds: Normal heart sounds.   Pulmonary:      Effort: Pulmonary effort is normal. No respiratory distress.      Breath sounds: Normal breath sounds.   Abdominal:      General: Abdomen is flat. There is no distension.      Palpations: Abdomen is soft.      Tenderness: There is no abdominal tenderness.   Musculoskeletal:         General: No swelling.      Right lower leg: No edema.   Skin:     General: Skin is warm and dry.      Capillary Refill: Capillary refill takes less than 2 seconds.   Neurological:      General: No focal deficit present.      Mental Status: She is alert and oriented to person, place, and time.   Psychiatric:         Mood and Affect: Mood normal.         Behavior: Behavior normal.         Thought Content: Thought content normal.         Judgment: Judgment normal.     /76 (BP Location: Left arm, Patient Position: Sitting, Cuff Size: Standard)   Pulse 68   Temp (!) 97.2 °F (36.2 °C) (Temporal)   Resp 13   Ht 5' 2\" (1.575 m)   Wt 54.4 kg (120 lb)   SpO2 98%   BMI 21.95 kg/m²     No Chest XR results available for this patient.   No CT Chest results available for this patient.  CT chest abdomen pelvis w " contrast    Result Date: 4/23/2024  Narrative CT CHEST, ABDOMEN AND PELVIS WITH IV CONTRAST INDICATION: R63.4: Abnormal weight loss R79.89: Other specified abnormal findings of blood chemistry E87.1: Hypo-osmolality and hyponatremia I71.21: Aneurysm of the ascending aorta, without rupture. COMPARISON: Chest CT July 23, 2018 TECHNIQUE: CT examination of the chest, abdomen and pelvis was performed. Multiplanar 2D reformatted images were created from the source data. This examination, like all CT scans performed in the [facility], was performed utilizing techniques to minimize radiation dose exposure, including the use of iterative reconstruction and automated exposure control. Radiation dose length product (DLP) for this visit: 843 mGy-cm IV Contrast: 100 mL of iohexol (OMNIPAQUE) Enteric Contrast: Not administered. FINDINGS: CHEST LUNGS: Mild centrilobular emphysematous changes. Stable biapical pleural-parenchymal scarring. No infiltrates or suspicious masses. No tracheal or endobronchial lesion. PLEURA: Unremarkable. HEART/GREAT VESSELS: Heart is unremarkable for patient's age. No thoracic Fusiform aneurysmal enlargement of the ascending thoracic aorta measuring up to 49 mm.. Stable compared to prior MEDIASTINUM AND NEEMA: Numerous pathologically enlarged mediastinal and bilateral hilar lymph nodes. The bulkiest adenopathy is in the lower mediastinum, subcarinal nodes, with largest measuring 2.9 cm short axis dimension. Left infrahilar node 1.9 cm. AP  window node 1.8 cm. Superior mediastinum right paratracheal node 1.1 cm. CHEST WALL AND LOWER NECK: Unremarkable. ABDOMEN LIVER/BILIARY TREE: Unremarkable. GALLBLADDER: Cholelithiasis without findings of acute cholecystitis. SPLEEN: Unremarkable. PANCREAS: Unremarkable. ADRENAL GLANDS: Unremarkable. KIDNEYS/URETERS: Stable small cortical cysts.. No hydronephrosis. STOMACH AND BOWEL: Unremarkable. APPENDIX: No findings to suggest appendicitis.  ABDOMINOPELVIC CAVITY: No ascites. No pneumoperitoneum. No lymphadenopathy. VESSELS: Unremarkable for patient's age. PELVIS REPRODUCTIVE ORGANS: Post hysterectomy. URINARY BLADDER: Unremarkable. ABDOMINAL WALL/INGUINAL REGIONS: Unremarkable. BONES: No acute fracture or suspicious osseous lesion.     Impression Numerous pathologically enlarged mediastinal and bilateral hilar adenopathy. Suggest lymphoma. Metastatic disease from unknown primary remains in the differential. Mild emphysematous changes and stable biapical pleural-parenchymal scarring. Stable a sending aortic aneurysm of 4.9 cm. Cholelithiasis without evidence of cholecystitis. The study was marked in EPIC for significant notification. Findings also sent via Tiger text to Dr. Adriana Gresham. Workstation performed: [id number]      No NM PET CT results available for this patient.   No Barium Swallow results available for this patient.

## 2024-05-05 NOTE — PROGRESS NOTES
Thoracic Consult  Assessment/Plan:  88yF w/ lymphadenopathy. Discussed findings with patient and her daughter. She has had a cervical fusion which does limit neck extension and may make mediastinoscopy difficult/impossible.     Will proceed with EBUS since she considers acting on the results. Will send for Flow in addition to cell block.     Discussed risks of the procedure being limited and small but do include bleeding. Consent was obtained today. There is the chance that biopsy with EBUS do not provide a diagnosis in which case we would arrange for her to follow-up for an attempted mediastinoscopy. I am still optimistic she has the neck extension for this.     Krishna Short, DO  Thoracic Surgery       Diagnoses and all orders for this visit:    Mediastinal lymphadenopathy  -     Ambulatory Referral to Thoracic Surgery  -     Case request operating room: ENDOBRONCHIAL ULTRASOUND (EBUS); Standing  -     Case request operating room: ENDOBRONCHIAL ULTRASOUND (EBUS)    Hilar adenopathy  -     Ambulatory Referral to Thoracic Surgery    Other orders  -     Diet NPO; Sips with meds; Standing  -     Void on call to OR; Standing  -     Insert peripheral IV; Standing  -     Place sequential compression device; Standing          Thoracic History   Diagnosis: Mediastinal lymphadenopathy   Procedures/Surgeries:    Pathology:    Adjuvant Therapy:       Subjective:    Patient ID: Damaris Truong is a 88 y.o. female who presents with lymphadenopathy. Her medical history includes basal cell carcinoma s/p resection, thoracic aneurysm, CAD, HTN, back pain, CKD, cervical fusion. She was referred from [facility]. The patient has lost 15 lbs in the last few months. CT was done showing lymphadenopathy in the mediastinum. She is a former smoker but quit in 1980s. She denies fevers, chills, night sweats.     Data:  The following results contain my personal interpretation and summarization.   CTCAP (4/17/24): Enlarged mediastinal and hilar  lymph nodes. Includes 4R, 4L, 7, 11R and 11L. Stable aneurysm of ascending thoracic aorta. No lung masses. No obvious abdominal lymphadenopathy.       The following portions of the patient's history were reviewed and updated as appropriate: allergies, current medications, past family history, past medical history, past social history, past surgical history, and problem list.    Past Medical History:   Diagnosis Date   • Aorta aneurysm (HCC)    • Basal cell carcinoma 06/14/2023    Left nasal tip   • GERD (gastroesophageal reflux disease)    • HL (hearing loss)    • Memory loss       Past Surgical History:   Procedure Laterality Date   • ADENOIDECTOMY     • BREAST BIOPSY Left    • BREAST LUMPECTOMY Left    • CATARACT EXTRACTION     • CERVICAL FUSION     • HYSTERECTOMY      age 51   • MOHS SURGERY Left 06/14/2023    BCC- Left nasal tip   • OOPHORECTOMY     • OTHER SURGICAL HISTORY      paravaginal defect graft-reinforced repair   • REPAIR RECTOCELE     • TONSILLECTOMY     • VEIN LIGATION AND STRIPPING Bilateral       Family History   Problem Relation Age of Onset   • Diabetes Mother         mellitus   • Hypertension Mother         daughter Nereida doesn't know   • Stroke Father         syndrome   • Hypertension Father         daughter Nereida doesn't know   • Dementia Sister    • Heart disease Sister    • Breast cancer Sister    • Dementia Sister    • COPD Sister    • Dementia Sister    • Coronary artery disease Sister    • Coronary artery disease Sister    • Dementia Sister    • Dementia Sister    • Breast cancer Sister 40   • No Known Problems Sister    • No Known Problems Maternal Aunt    • No Known Problems Maternal Aunt    • No Known Problems Maternal Aunt    • No Known Problems Paternal Aunt    • No Known Problems Paternal Aunt    • No Known Problems Paternal Aunt    • No Known Problems Paternal Aunt    • No Known Problems Paternal Aunt    • No Known Problems Maternal Grandmother    • No Known Problems Maternal  Grandfather    • No Known Problems Paternal Grandmother    • No Known Problems Paternal Grandfather    • Endometrial cancer Daughter    • No Known Problems Daughter    • Colon cancer Niece       Social History     Socioeconomic History   • Marital status: /Civil Union     Spouse name: Not on file   • Number of children: Not on file   • Years of education: Not on file   • Highest education level: Not on file   Occupational History   • Not on file   Tobacco Use   • Smoking status: Former     Current packs/day: 0.00     Types: Cigarettes     Quit date:      Years since quittin.3     Passive exposure: Past   • Smokeless tobacco: Never   Vaping Use   • Vaping status: Never Used   Substance and Sexual Activity   • Alcohol use: No   • Drug use: No   • Sexual activity: Not Currently     Partners: Male   Other Topics Concern   • Not on file   Social History Narrative    Daily coffee consumption (1 cups/day)     Social Determinants of Health     Financial Resource Strain: Low Risk  (2022)    Overall Financial Resource Strain (CARDIA)    • Difficulty of Paying Living Expenses: Not hard at all   Food Insecurity: Not on file   Transportation Needs: No Transportation Needs (2022)    PRAPARE - Transportation    • Lack of Transportation (Medical): No    • Lack of Transportation (Non-Medical): No   Physical Activity: Not on file   Stress: Not on file   Social Connections: Not on file   Intimate Partner Violence: Not on file   Housing Stability: Not on file      Review of Systems   Constitutional:  Positive for unexpected weight change. Negative for activity change, fatigue and fever.   HENT:  Negative for trouble swallowing and voice change.    Eyes:  Negative for photophobia and visual disturbance.   Respiratory:  Negative for chest tightness, shortness of breath and wheezing.    Cardiovascular:  Negative for chest pain and palpitations.   Gastrointestinal:  Negative for abdominal pain, nausea and  "vomiting.   Genitourinary:  Negative for dysuria and flank pain.   Musculoskeletal:  Positive for neck stiffness. Negative for back pain and neck pain.   Skin:  Negative for rash and wound.   Neurological:  Negative for dizziness, weakness, light-headedness and headaches.   All other systems reviewed and are negative.        Objective:   Physical Exam  Vitals reviewed.   Constitutional:       Appearance: Normal appearance. She is normal weight.   HENT:      Head: Normocephalic and atraumatic.   Eyes:      Extraocular Movements: Extraocular movements intact.      Pupils: Pupils are equal, round, and reactive to light.   Neck:      Comments: Some limit of neck extension.   Cardiovascular:      Rate and Rhythm: Normal rate and regular rhythm.      Pulses: Normal pulses.      Heart sounds: Normal heart sounds.   Pulmonary:      Effort: Pulmonary effort is normal. No respiratory distress.      Breath sounds: Normal breath sounds.   Abdominal:      General: Abdomen is flat. There is no distension.      Palpations: Abdomen is soft.      Tenderness: There is no abdominal tenderness.   Musculoskeletal:         General: No swelling.      Right lower leg: No edema.   Skin:     General: Skin is warm and dry.      Capillary Refill: Capillary refill takes less than 2 seconds.   Neurological:      General: No focal deficit present.      Mental Status: She is alert and oriented to person, place, and time.   Psychiatric:         Mood and Affect: Mood normal.         Behavior: Behavior normal.         Thought Content: Thought content normal.         Judgment: Judgment normal.     /76 (BP Location: Left arm, Patient Position: Sitting, Cuff Size: Standard)   Pulse 68   Temp (!) 97.2 °F (36.2 °C) (Temporal)   Resp 13   Ht 5' 2\" (1.575 m)   Wt 54.4 kg (120 lb)   SpO2 98%   BMI 21.95 kg/m²     No Chest XR results available for this patient.   No CT Chest results available for this patient.  CT chest abdomen pelvis w " contrast    Result Date: 4/23/2024  Narrative CT CHEST, ABDOMEN AND PELVIS WITH IV CONTRAST INDICATION: R63.4: Abnormal weight loss R79.89: Other specified abnormal findings of blood chemistry E87.1: Hypo-osmolality and hyponatremia I71.21: Aneurysm of the ascending aorta, without rupture. COMPARISON: Chest CT July 23, 2018 TECHNIQUE: CT examination of the chest, abdomen and pelvis was performed. Multiplanar 2D reformatted images were created from the source data. This examination, like all CT scans performed in the [facility], was performed utilizing techniques to minimize radiation dose exposure, including the use of iterative reconstruction and automated exposure control. Radiation dose length product (DLP) for this visit: 843 mGy-cm IV Contrast: 100 mL of iohexol (OMNIPAQUE) Enteric Contrast: Not administered. FINDINGS: CHEST LUNGS: Mild centrilobular emphysematous changes. Stable biapical pleural-parenchymal scarring. No infiltrates or suspicious masses. No tracheal or endobronchial lesion. PLEURA: Unremarkable. HEART/GREAT VESSELS: Heart is unremarkable for patient's age. No thoracic Fusiform aneurysmal enlargement of the ascending thoracic aorta measuring up to 49 mm.. Stable compared to prior MEDIASTINUM AND NEEMA: Numerous pathologically enlarged mediastinal and bilateral hilar lymph nodes. The bulkiest adenopathy is in the lower mediastinum, subcarinal nodes, with largest measuring 2.9 cm short axis dimension. Left infrahilar node 1.9 cm. AP  window node 1.8 cm. Superior mediastinum right paratracheal node 1.1 cm. CHEST WALL AND LOWER NECK: Unremarkable. ABDOMEN LIVER/BILIARY TREE: Unremarkable. GALLBLADDER: Cholelithiasis without findings of acute cholecystitis. SPLEEN: Unremarkable. PANCREAS: Unremarkable. ADRENAL GLANDS: Unremarkable. KIDNEYS/URETERS: Stable small cortical cysts.. No hydronephrosis. STOMACH AND BOWEL: Unremarkable. APPENDIX: No findings to suggest appendicitis.  ABDOMINOPELVIC CAVITY: No ascites. No pneumoperitoneum. No lymphadenopathy. VESSELS: Unremarkable for patient's age. PELVIS REPRODUCTIVE ORGANS: Post hysterectomy. URINARY BLADDER: Unremarkable. ABDOMINAL WALL/INGUINAL REGIONS: Unremarkable. BONES: No acute fracture or suspicious osseous lesion.     Impression Numerous pathologically enlarged mediastinal and bilateral hilar adenopathy. Suggest lymphoma. Metastatic disease from unknown primary remains in the differential. Mild emphysematous changes and stable biapical pleural-parenchymal scarring. Stable a sending aortic aneurysm of 4.9 cm. Cholelithiasis without evidence of cholecystitis. The study was marked in EPIC for significant notification. Findings also sent via Tiger text to Dr. Adriana Gresham. Workstation performed: [id number]      No NM PET CT results available for this patient.   No Barium Swallow results available for this patient.

## 2024-05-06 ENCOUNTER — TELEPHONE (OUTPATIENT)
Dept: HEMATOLOGY ONCOLOGY | Facility: CLINIC | Age: 89
End: 2024-05-06

## 2024-05-06 ENCOUNTER — OFFICE VISIT (OUTPATIENT)
Dept: CARDIAC SURGERY | Facility: CLINIC | Age: 89
End: 2024-05-06
Payer: MEDICARE

## 2024-05-06 VITALS
HEART RATE: 68 BPM | TEMPERATURE: 97.2 F | RESPIRATION RATE: 13 BRPM | BODY MASS INDEX: 22.08 KG/M2 | DIASTOLIC BLOOD PRESSURE: 76 MMHG | SYSTOLIC BLOOD PRESSURE: 115 MMHG | WEIGHT: 120 LBS | OXYGEN SATURATION: 98 % | HEIGHT: 62 IN

## 2024-05-06 DIAGNOSIS — R59.0 HILAR ADENOPATHY: ICD-10-CM

## 2024-05-06 DIAGNOSIS — R59.0 MEDIASTINAL LYMPHADENOPATHY: ICD-10-CM

## 2024-05-06 PROCEDURE — 99205 OFFICE O/P NEW HI 60 MIN: CPT | Performed by: SURGERY

## 2024-05-09 ENCOUNTER — ANESTHESIA EVENT (OUTPATIENT)
Dept: PERIOP | Facility: HOSPITAL | Age: 89
End: 2024-05-09
Payer: MEDICARE

## 2024-05-10 NOTE — PRE-PROCEDURE INSTRUCTIONS
Pre-Surgery Instructions:   Medication Instructions    atorvastatin (LIPITOR) 10 mg tablet Take day of surgery.    metoprolol tartrate (LOPRESSOR) 25 mg tablet Take day of surgery.      Medication instructions for day surgery reviewed with andrae Padron. Please use only a sip of water to take your instructed medications. Avoid all over the counter vitamins, supplements and NSAIDS for one week prior to surgery per anesthesia guidelines. Tylenol is ok to take as needed.     You will receive a call one business day prior to surgery with an arrival time and hospital directions. If your surgery is scheduled on a Monday, the hospital will be calling you on the Friday prior to your surgery. If you have not heard from anyone by 8pm, please call the hospital supervisor through the hospital  at [phone]. ([place] [phone] or [place] [phone]).    Do not eat or drink anything after midnight the night before your surgery, including candy, mints, lifesavers, or chewing gum. Do not drink alcohol 24hrs before your surgery. Try not to smoke at least 24hrs before your surgery.       Follow the pre surgery showering instructions as listed in the “My Surgical Experience Booklet” or otherwise provided by your surgeon's office. Do not use a blade to shave the surgical area 1 week before surgery. It is okay to use a clean electric clippers up to 24 hours before surgery. Do not apply any lotions, creams, including makeup, cologne, deodorant, or perfumes after showering on the day of your surgery. Do not use dry shampoo, hair spray, hair gel, or any type of hair products.     No contact lenses, eye make-up, or artificial eyelashes. Remove nail polish, including gel polish, and any artificial, gel, or acrylic nails if possible. Remove all jewelry including rings and body piercing jewelry.     Wear causal clothing that is easy to take on and off. Consider your type of surgery.    Keep any valuables, jewelry, piercings at  home. Please bring any specially ordered equipment (sling, braces) if indicated.    Arrange for a responsible person to drive you to and from the hospital on the day of your surgery. Please confirm the visitor policy for the day of your procedure when you receive your phone call with an arrival time.     Call the surgeon's office with any new illnesses, exposures, or additional questions prior to surgery.    Please reference your “My Surgical Experience Booklet” for additional information to prepare for your upcoming surgery.

## 2024-05-14 NOTE — ANESTHESIA PREPROCEDURE EVALUATION
Procedure:  ENDOBRONCHIAL ULTRASOUND (EBUS) with biopsies ([place])    Relevant Problems   CARDIO   (+) Aneurysm of ascending aorta (HCC)   (+) Carotid artery obstruction, left   (+) Coronary artery disease involving native heart without angina pectoris   (+) Hyperlipidemia   (+) Hypertension   (+) Thoracic aortic aneurysm without rupture (HCC)      GI/HEPATIC   (+) GERD without esophagitis      /RENAL   (+) Stage 3b chronic kidney disease (HCC)      MUSCULOSKELETAL   (+) Chronic left-sided low back pain without sciatica   (+) Low back pain with sciatica      NEURO/PSYCH   (+) Chronic left shoulder pain   (+) Chronic left-sided low back pain without sciatica        Physical Exam    Airway    Mallampati score: II  TM Distance: >3 FB  Neck ROM: full     Dental       Cardiovascular      Pulmonary      Other Findings  post-pubertal.      Anesthesia Plan  ASA Score- 3     Anesthesia Type- general with ASA Monitors.         Additional Monitors:     Airway Plan: LMA.           Plan Factors-    Chart reviewed.                      Induction- intravenous.    Postoperative Plan-     Informed Consent- Anesthetic plan and risks discussed with patient.  I personally reviewed this patient with the CRNA. Discussed and agreed on the Anesthesia Plan with the CRNA..

## 2024-05-15 ENCOUNTER — HOSPITAL ENCOUNTER (OUTPATIENT)
Facility: HOSPITAL | Age: 89
Setting detail: OUTPATIENT SURGERY
Discharge: HOME | End: 2024-05-15
Attending: SURGERY | Admitting: SURGERY
Payer: MEDICARE

## 2024-05-15 ENCOUNTER — ANESTHESIA (OUTPATIENT)
Dept: PERIOP | Facility: HOSPITAL | Age: 89
End: 2024-05-15
Payer: MEDICARE

## 2024-05-15 VITALS
TEMPERATURE: 96.6 F | DIASTOLIC BLOOD PRESSURE: 61 MMHG | BODY MASS INDEX: 22.08 KG/M2 | OXYGEN SATURATION: 97 % | SYSTOLIC BLOOD PRESSURE: 100 MMHG | HEIGHT: 62 IN | HEART RATE: 78 BPM | RESPIRATION RATE: 16 BRPM | WEIGHT: 120 LBS

## 2024-05-15 DIAGNOSIS — R59.0 MEDIASTINAL LYMPHADENOPATHY: ICD-10-CM

## 2024-05-15 PROCEDURE — 88185 FLOWCYTOMETRY/TC ADD-ON: CPT | Performed by: SURGERY

## 2024-05-15 PROCEDURE — 88360 TUMOR IMMUNOHISTOCHEM/MANUAL: CPT | Performed by: PATHOLOGY

## 2024-05-15 PROCEDURE — 88365 INSITU HYBRIDIZATION (FISH): CPT | Performed by: PATHOLOGY

## 2024-05-15 PROCEDURE — 88173 CYTOPATH EVAL FNA REPORT: CPT | Performed by: PATHOLOGY

## 2024-05-15 PROCEDURE — 88112 CYTOPATH CELL ENHANCE TECH: CPT | Performed by: PATHOLOGY

## 2024-05-15 PROCEDURE — 88305 TISSUE EXAM BY PATHOLOGIST: CPT | Performed by: PATHOLOGY

## 2024-05-15 PROCEDURE — 31652 BRONCH EBUS SAMPLNG 1/2 NODE: CPT | Performed by: SURGERY

## 2024-05-15 PROCEDURE — 88172 CYTP DX EVAL FNA 1ST EA SITE: CPT | Performed by: PATHOLOGY

## 2024-05-15 PROCEDURE — 88184 FLOWCYTOMETRY/ TC 1 MARKER: CPT | Performed by: SURGERY

## 2024-05-15 PROCEDURE — 88333 PATH CONSLTJ SURG CYTO XM 1: CPT | Performed by: PATHOLOGY

## 2024-05-15 PROCEDURE — 88342 IMHCHEM/IMCYTCHM 1ST ANTB: CPT | Performed by: PATHOLOGY

## 2024-05-15 PROCEDURE — 88341 IMHCHEM/IMCYTCHM EA ADD ANTB: CPT | Performed by: PATHOLOGY

## 2024-05-15 RX ORDER — SODIUM CHLORIDE, SODIUM LACTATE, POTASSIUM CHLORIDE, CALCIUM CHLORIDE 600; 310; 30; 20 MG/100ML; MG/100ML; MG/100ML; MG/100ML
INJECTION, SOLUTION INTRAVENOUS CONTINUOUS PRN
Status: DISCONTINUED | OUTPATIENT
Start: 2024-05-15 | End: 2024-05-15

## 2024-05-15 RX ORDER — ONDANSETRON 2 MG/ML
4 INJECTION INTRAMUSCULAR; INTRAVENOUS ONCE AS NEEDED
Status: DISCONTINUED | OUTPATIENT
Start: 2024-05-15 | End: 2024-05-15 | Stop reason: HOSPADM

## 2024-05-15 RX ORDER — ALBUTEROL SULFATE 2.5 MG/3ML
2.5 SOLUTION RESPIRATORY (INHALATION) ONCE AS NEEDED
Status: DISCONTINUED | OUTPATIENT
Start: 2024-05-15 | End: 2024-05-15 | Stop reason: HOSPADM

## 2024-05-15 RX ORDER — PROPOFOL 10 MG/ML
INJECTION, EMULSION INTRAVENOUS AS NEEDED
Status: DISCONTINUED | OUTPATIENT
Start: 2024-05-15 | End: 2024-05-15

## 2024-05-15 RX ORDER — PROPOFOL 10 MG/ML
INJECTION, EMULSION INTRAVENOUS CONTINUOUS PRN
Status: DISCONTINUED | OUTPATIENT
Start: 2024-05-15 | End: 2024-05-15

## 2024-05-15 RX ORDER — ONDANSETRON 2 MG/ML
INJECTION INTRAMUSCULAR; INTRAVENOUS AS NEEDED
Status: DISCONTINUED | OUTPATIENT
Start: 2024-05-15 | End: 2024-05-15

## 2024-05-15 RX ORDER — LIDOCAINE HYDROCHLORIDE 10 MG/ML
INJECTION, SOLUTION EPIDURAL; INFILTRATION; INTRACAUDAL; PERINEURAL AS NEEDED
Status: DISCONTINUED | OUTPATIENT
Start: 2024-05-15 | End: 2024-05-15

## 2024-05-15 RX ORDER — FENTANYL CITRATE/PF 50 MCG/ML
50 SYRINGE (ML) INJECTION
Status: DISCONTINUED | OUTPATIENT
Start: 2024-05-15 | End: 2024-05-15 | Stop reason: HOSPADM

## 2024-05-15 RX ORDER — FENTANYL CITRATE 50 UG/ML
INJECTION, SOLUTION INTRAMUSCULAR; INTRAVENOUS AS NEEDED
Status: DISCONTINUED | OUTPATIENT
Start: 2024-05-15 | End: 2024-05-15

## 2024-05-15 RX ADMIN — PROPOFOL 100 MCG/KG/MIN: 10 INJECTION, EMULSION INTRAVENOUS at 08:34

## 2024-05-15 RX ADMIN — SODIUM CHLORIDE, SODIUM LACTATE, POTASSIUM CHLORIDE, AND CALCIUM CHLORIDE: .6; .31; .03; .02 INJECTION, SOLUTION INTRAVENOUS at 08:20

## 2024-05-15 RX ADMIN — LIDOCAINE HYDROCHLORIDE 40 MG: 10 INJECTION, SOLUTION EPIDURAL; INFILTRATION; INTRACAUDAL; PERINEURAL at 08:32

## 2024-05-15 RX ADMIN — ONDANSETRON 4 MG: 2 INJECTION INTRAMUSCULAR; INTRAVENOUS at 08:38

## 2024-05-15 RX ADMIN — FENTANYL CITRATE 25 MCG: 50 INJECTION INTRAMUSCULAR; INTRAVENOUS at 08:32

## 2024-05-15 RX ADMIN — PHENYLEPHRINE HYDROCHLORIDE 50 MCG/MIN: 10 INJECTION INTRAVENOUS at 08:34

## 2024-05-15 RX ADMIN — SODIUM CHLORIDE 0.15 MCG/KG/MIN: 900 INJECTION INTRAVENOUS at 08:34

## 2024-05-15 RX ADMIN — PROPOFOL 90 MG: 10 INJECTION, EMULSION INTRAVENOUS at 08:32

## 2024-05-15 RX ADMIN — SUGAMMADEX 200 MG: 100 INJECTION, SOLUTION INTRAVENOUS at 09:15

## 2024-05-15 RX ADMIN — SUGAMMADEX 200 MG: 100 INJECTION, SOLUTION INTRAVENOUS at 09:24

## 2024-05-15 NOTE — OP NOTE
OPERATIVE REPORT  PATIENT NAME: Damaris Truong    :  1935  MRN: [id number]  Pt Location: [facility] OR ROOM 08    SURGERY DATE: 5/15/2024    Surgeons and Role:     * Krishna Short,  - Primary    Preop Diagnosis:  Mediastinal lymphadenopathy [R59.0]    Post-Op Diagnosis Codes:     * Mediastinal lymphadenopathy [R59.0]    Procedure(s):  ENDOBRONCHIAL ULTRASOUND (EBUS) with transbronchial biopsies x2 levels: 4R and 7    Specimen(s):  ID Type Source Tests Collected by Time Destination   1 : level 7 FNA Lymph Node FINE NEEDLE ASPIRATION/BIOPSY Krishna Short, DO 5/15/2024 0848    2 : level 4R FNA Lymph Node FINE NEEDLE ASPIRATION/BIOPSY Krishna Short, DO 5/15/2024 0903    A : level 7 Tissue Lymph Node LEUKEMIA/LYMPHOMA FLOW CYTOMETRY Krishna Short, DO 5/15/2024 0854    B : level 4R Tissue Lymph Node LEUKEMIA/LYMPHOMA FLOW CYTOMETRY Krishna Short, DO 5/15/2024 0907        Estimated Blood Loss:   Minimal    Drains:  * No LDAs found *    Anesthesia Type:   General    Operative Indications:  Mediastinal lymphadenopathy [R59.0]      Operative Findings:  Enlarged mediastinal lymphadenopathy.   Biopsies performed with large 19 gauge needle.  Level 7 <2cm. Sent for YULIYA, Cell block and Flow. Large pellet in each. No Dx on YULIYA.   Level 4R 1.5cm node neighboring multiple other enlarged 4R nodes. Sent for Cell block and Flow. Large pellet in each.       Complications:   None    Procedure and Technique:    The patient was brought to the operating room and placed in the supine position. After institution of general anesthesia utilizing a single lumen ETT, the fiberoptic bronchoscope was inserted.  The ETT was placed high and secured under visualization. The trachea appears normal. The ansley is sharp. The airways are normal to the subsegmental level bilaterally. There are no endobronchial lesions and secretions are scant.  The standard bronchoscope was then exchanged for an  endobronchial ultrasound scope and an endobronchial ultrasound exam is performed.  Using real-time ultrasound guidance, the mediastinal lymphadenopathy was assessed. There were multiple enlarged 4R nodes, a large 7 and an enlarged 4L. Biopsies were performed of levels 4R and 7 using the 19 gauge larger needle.  Biopsies were performed both with and without suction and level 7 specimen was placed on slides for rapid on-site evaluation.  At least 2 punctures of each maddison station were performed and specimen was placed in Cytolyte for later cell block analysis. 4R and 7 sent in RPMI  At the completion of the endobronchial ultrasound, a standard bronchoscope was reinserted and the airways were suctioned clear.  There was no sign of ongoing bleeding.  The bronchoscope was removed, the patient was extubated, and brought to the recovery unit in stable condition having tolerated the procedure well.  Sponge and instrument counts were correct.       I was present for the entire procedure.    Patient Disposition:  PACU         SIGNATURE: Krishna Short DO  DATE: May 15, 2024  TIME: 9:30 AM

## 2024-05-15 NOTE — ANESTHESIA POSTPROCEDURE EVALUATION
Post-Op Assessment Note    CV Status:  Stable    Pain management: adequate       Mental Status:  Alert and awake   Hydration Status:  Euvolemic   PONV Controlled:  Controlled   Airway Patency:  Patent     Post Op Vitals Reviewed: Yes    No anethesia notable event occurred.    Staff: CRNA               BP   103/70   Temp   97.4   Pulse  68   Resp   16   SpO2   98 FM

## 2024-05-15 NOTE — DISCHARGE INSTR - AVS FIRST PAGE
Today you had a bronchoscopy with transbronchial biopsies performed (EBUS).     You do not need a CXR after this procedure. You should not need any pain medication.     A sore throat can be common after being on the breathing machine but this will get better with time. A small amount of coughing up blood is common today after your procedure. If there is a concerning amount or it is persistent please call the office or go to the Emergency Department. Also call or go to the Emergency Department with any concerning signs or symptoms such as chest pain, difficulty breathing, or fevers.     I will call you with your biopsy results and we will discuss any required next steps.

## 2024-05-15 NOTE — INTERVAL H&P NOTE
H&P reviewed. After examining the patient I find no changes in the patients condition since the H&P had been written.    Vitals:    05/15/24 0730   BP: (!) 89/66   Pulse:    Resp:    Temp:    SpO2:      OR for EBUS. Will send for cell block and flow.   Ok with single-lumen ETT. DO not need an LMA.

## 2024-05-17 LAB — SCAN RESULT: NORMAL

## 2024-05-20 ENCOUNTER — RA CDI HCC (OUTPATIENT)
Dept: OTHER | Facility: HOSPITAL | Age: 89
End: 2024-05-20

## 2024-05-20 LAB — SCAN RESULT: NORMAL

## 2024-05-20 PROCEDURE — 88172 CYTP DX EVAL FNA 1ST EA SITE: CPT | Performed by: PATHOLOGY

## 2024-05-20 PROCEDURE — 88360 TUMOR IMMUNOHISTOCHEM/MANUAL: CPT | Performed by: PATHOLOGY

## 2024-05-20 PROCEDURE — 88341 IMHCHEM/IMCYTCHM EA ADD ANTB: CPT | Performed by: PATHOLOGY

## 2024-05-20 PROCEDURE — 88305 TISSUE EXAM BY PATHOLOGIST: CPT | Performed by: PATHOLOGY

## 2024-05-20 PROCEDURE — 88112 CYTOPATH CELL ENHANCE TECH: CPT | Performed by: PATHOLOGY

## 2024-05-20 PROCEDURE — 88365 INSITU HYBRIDIZATION (FISH): CPT | Performed by: PATHOLOGY

## 2024-05-20 PROCEDURE — 88342 IMHCHEM/IMCYTCHM 1ST ANTB: CPT | Performed by: PATHOLOGY

## 2024-05-20 PROCEDURE — 88173 CYTOPATH EVAL FNA REPORT: CPT | Performed by: PATHOLOGY

## 2024-05-22 LAB
ALBUMIN SERPL-MCNC: 3.7 G/DL (ref 3.6–5.1)
ALBUMIN/GLOB SERPL: 1.3 (CALC) (ref 1–2.5)
ALP SERPL-CCNC: 142 U/L (ref 37–153)
ALT SERPL-CCNC: 19 U/L (ref 6–29)
AST SERPL-CCNC: 23 U/L (ref 10–35)
BASOPHILS # BLD AUTO: 30 CELLS/UL (ref 0–200)
BASOPHILS NFR BLD AUTO: 0.8 %
BILIRUB SERPL-MCNC: 0.6 MG/DL (ref 0.2–1.2)
BUN SERPL-MCNC: 16 MG/DL (ref 7–25)
BUN/CREAT SERPL: 15 (CALC) (ref 6–22)
CALCIUM SERPL-MCNC: 9.5 MG/DL (ref 8.6–10.4)
CHLORIDE SERPL-SCNC: 101 MMOL/L (ref 98–110)
CO2 SERPL-SCNC: 30 MMOL/L (ref 20–32)
CREAT SERPL-MCNC: 1.04 MG/DL (ref 0.6–0.95)
EOSINOPHIL # BLD AUTO: 270 CELLS/UL (ref 15–500)
EOSINOPHIL NFR BLD AUTO: 7.1 %
ERYTHROCYTE [DISTWIDTH] IN BLOOD BY AUTOMATED COUNT: 14.8 % (ref 11–15)
GFR/BSA.PRED SERPLBLD CYS-BASED-ARV: 52 ML/MIN/1.73M2
GLOBULIN SER CALC-MCNC: 2.9 G/DL (CALC) (ref 1.9–3.7)
GLUCOSE SERPL-MCNC: 129 MG/DL (ref 65–99)
HCT VFR BLD AUTO: 37.1 % (ref 35–45)
HGB BLD-MCNC: 12 G/DL (ref 11.7–15.5)
LDH SERPL-CCNC: 167 U/L (ref 120–250)
LYMPHOCYTES # BLD AUTO: 528 CELLS/UL (ref 850–3900)
LYMPHOCYTES NFR BLD AUTO: 13.9 %
MCH RBC QN AUTO: 29.2 PG (ref 27–33)
MCHC RBC AUTO-ENTMCNC: 32.3 G/DL (ref 32–36)
MCV RBC AUTO: 90.3 FL (ref 80–100)
MONOCYTES # BLD AUTO: 391 CELLS/UL (ref 200–950)
MONOCYTES NFR BLD AUTO: 10.3 %
NEUTROPHILS # BLD AUTO: 2580 CELLS/UL (ref 1500–7800)
NEUTROPHILS NFR BLD AUTO: 67.9 %
PLATELET # BLD AUTO: 274 THOUSAND/UL (ref 140–400)
PMV BLD REES-ECKER: 9 FL (ref 7.5–12.5)
POTASSIUM SERPL-SCNC: 3.8 MMOL/L (ref 3.5–5.3)
PROT SERPL-MCNC: 6.6 G/DL (ref 6.1–8.1)
RBC # BLD AUTO: 4.11 MILLION/UL (ref 3.8–5.1)
SODIUM SERPL-SCNC: 139 MMOL/L (ref 135–146)
URATE SERPL-MCNC: 3.6 MG/DL (ref 2.5–7)
WBC # BLD AUTO: 3.8 THOUSAND/UL (ref 3.8–10.8)

## 2024-05-24 ENCOUNTER — OFFICE VISIT (OUTPATIENT)
Dept: HEMATOLOGY ONCOLOGY | Facility: CLINIC | Age: 89
End: 2024-05-24
Payer: MEDICARE

## 2024-05-24 VITALS
OXYGEN SATURATION: 97 % | SYSTOLIC BLOOD PRESSURE: 122 MMHG | RESPIRATION RATE: 17 BRPM | HEART RATE: 89 BPM | TEMPERATURE: 97 F | HEIGHT: 62 IN | BODY MASS INDEX: 21.35 KG/M2 | WEIGHT: 116 LBS | DIASTOLIC BLOOD PRESSURE: 62 MMHG

## 2024-05-24 DIAGNOSIS — R59.0 MEDIASTINAL ADENOPATHY: ICD-10-CM

## 2024-05-24 DIAGNOSIS — R63.4 WEIGHT LOSS, ABNORMAL: Primary | ICD-10-CM

## 2024-05-24 PROCEDURE — G2211 COMPLEX E/M VISIT ADD ON: HCPCS | Performed by: PHYSICIAN ASSISTANT

## 2024-05-24 PROCEDURE — 99214 OFFICE O/P EST MOD 30 MIN: CPT | Performed by: PHYSICIAN ASSISTANT

## 2024-05-24 NOTE — PROGRESS NOTES
Hematology/Oncology Outpatient Consult   Damaris Truong 88 y.o. female 1935 [id number]    Date:  5/24/2024  Assessment and Plan:  1. Weight loss 2. Mediastinal adenopathy  S/p EBUS bx that is not conclusive of dx   Will review with thoracic surgeon about possible mediastinoscopy with excisional biopsy  Patient's daughter and patient did states she recovered well post anesthesia.  They would not have concerns going under anesthesia again.  We reviewed other option would be repeating imaging at 3-month interval which would be July, if a different area is present such as supraclavicular, axillary, inguinal adenopathy, this could be easily or accessible for excisional biopsy.  Patient is asymptomatic except for weight loss however daughter mentions that her protein intake is poor.    Ensure samples provided.    Will call daughter once I hear back from thoracic surgeon.      HPI:  88-year-old female presents for follow-up visit.    She was seen in consultation in    History significant for aortic aneurysm, basal cell carcinoma status post Mohs surgery, tobacco abuse, smokes 5 to 10 cigarettes daily for about 25 to 30 years, quit in the 1990s.    Patient was having approximately 15 pounds unintentional weight loss as well as fatigue, weakness therefore PCP ordered CT chest abdomen and pelvis.  This showed adenopathy in the chest.    She was referred to medical oncology.  She was then referred to thoracic surgery for biopsy.    Interval history:    ROS: Review of Systems   Constitutional:  Positive for appetite change (fluctuates) and fatigue (takes multiple naps a day). Negative for chills, fever and unexpected weight change.   HENT:  Negative for trouble swallowing.    Respiratory:  Negative for cough and shortness of breath.    Cardiovascular:  Negative for chest pain, palpitations and leg swelling.   Gastrointestinal:  Negative for abdominal pain, constipation, diarrhea, nausea and vomiting.   Genitourinary:   Negative for difficulty urinating, dysuria and hematuria.   Musculoskeletal:  Negative for arthralgias.   Skin: Negative.    Neurological:  Negative for dizziness, weakness, light-headedness, numbness and headaches.   Hematological: Negative.    Psychiatric/Behavioral: Negative.         Past Medical History:   Diagnosis Date    Aorta aneurysm (HCC)     Basal cell carcinoma 2023    Left nasal tip    Chronic kidney disease     GERD (gastroesophageal reflux disease)     HL (hearing loss)     Hyperlipidemia     Liver disease     Memory loss        Past Surgical History:   Procedure Laterality Date    ADENOIDECTOMY      APPENDECTOMY      BREAST BIOPSY Left     BREAST LUMPECTOMY Left     CATARACT EXTRACTION      CERVICAL FUSION      ENDOBRONCHIAL ULTRASOUND (EBUS) N/A 5/15/2024    Procedure: ENDOBRONCHIAL ULTRASOUND (EBUS) with biopsies;  Surgeon: Krishna Short DO;  Location: [facility];  Service: Thoracic    HYSTERECTOMY      age 51    MOHS SURGERY Left 2023    BCC- Left nasal tip    OOPHORECTOMY      OTHER SURGICAL HISTORY      paravaginal defect graft-reinforced repair    REPAIR RECTOCELE      TONSILLECTOMY      VEIN LIGATION AND STRIPPING Bilateral        Social History     Socioeconomic History    Marital status: /Civil Union     Spouse name: None    Number of children: None    Years of education: None    Highest education level: None   Occupational History    None   Tobacco Use    Smoking status: Former     Current packs/day: 0.00     Types: Cigarettes     Quit date:      Years since quittin.4     Passive exposure: Past    Smokeless tobacco: Never   Vaping Use    Vaping status: Never Used   Substance and Sexual Activity    Alcohol use: No    Drug use: No    Sexual activity: Not Currently     Partners: Male   Other Topics Concern    None   Social History Narrative    Daily coffee consumption (1 cups/day)     Social Determinants of Health     Financial Resource Strain: Low Risk   (11/30/2022)    Overall Financial Resource Strain (CARDIA)     Difficulty of Paying Living Expenses: Not hard at all   Food Insecurity: Not on file   Transportation Needs: No Transportation Needs (11/30/2022)    PRAPARE - Transportation     Lack of Transportation (Medical): No     Lack of Transportation (Non-Medical): No   Physical Activity: Not on file   Stress: Not on file   Social Connections: Not on file   Intimate Partner Violence: Not on file   Housing Stability: Not on file       Family History   Problem Relation Age of Onset    Diabetes Mother         mellitus    Hypertension Mother         daughter Nereida doesn't know    Stroke Father         syndrome    Hypertension Father         daughter Nereida doesn't know    Dementia Sister     Heart disease Sister     Breast cancer Sister     Dementia Sister     COPD Sister     Dementia Sister     Coronary artery disease Sister     Coronary artery disease Sister     Dementia Sister     Dementia Sister     Breast cancer Sister 40    No Known Problems Sister     No Known Problems Maternal Aunt     No Known Problems Maternal Aunt     No Known Problems Maternal Aunt     No Known Problems Paternal Aunt     No Known Problems Paternal Aunt     No Known Problems Paternal Aunt     No Known Problems Paternal Aunt     No Known Problems Paternal Aunt     No Known Problems Maternal Grandmother     No Known Problems Maternal Grandfather     No Known Problems Paternal Grandmother     No Known Problems Paternal Grandfather     Endometrial cancer Daughter     No Known Problems Daughter     Colon cancer Niece        No Known Allergies      Current Outpatient Medications:     atorvastatin (LIPITOR) 10 mg tablet, TAKE 1 TABLET(10 MG) BY MOUTH DAILY, Disp: 90 tablet, Rfl: 0    metoprolol tartrate (LOPRESSOR) 25 mg tablet, Take 1 tablet (25 mg total) by mouth every 12 (twelve) hours, Disp: 180 tablet, Rfl: 1      Physical Exam:  /62 (BP Location: Left arm, Patient Position:  "Sitting, Cuff Size: Adult)   Pulse 89   Temp (!) 97 °F (36.1 °C)   Resp 17   Ht 5' 2\" (1.575 m)   Wt 52.6 kg (116 lb)   SpO2 97%   BMI 21.22 kg/m²     Physical Exam  Vitals reviewed.   Constitutional:       General: She is not in acute distress.     Appearance: She is well-developed. She is not ill-appearing.   HENT:      Head: Normocephalic and atraumatic.   Eyes:      General: No scleral icterus.     Conjunctiva/sclera: Conjunctivae normal.   Cardiovascular:      Rate and Rhythm: Normal rate and regular rhythm.      Heart sounds: Normal heart sounds. No murmur heard.  Pulmonary:      Effort: Pulmonary effort is normal. No respiratory distress.      Breath sounds: Normal breath sounds.   Abdominal:      Palpations: Abdomen is soft.      Tenderness: There is no abdominal tenderness.   Musculoskeletal:         General: No tenderness. Normal range of motion.      Cervical back: Normal range of motion and neck supple.      Right lower leg: No edema.      Left lower leg: No edema.      Comments: Ambulates with cane    Lymphadenopathy:      Cervical: No cervical adenopathy.   Skin:     General: Skin is warm and dry.   Neurological:      Mental Status: She is alert and oriented to person, place, and time.      Cranial Nerves: No cranial nerve deficit.   Psychiatric:         Mood and Affect: Mood normal.         Behavior: Behavior normal.     Labs:  Lab Results   Component Value Date    WBC 3.8 05/22/2024    HGB 12.0 05/22/2024    HCT 37.1 05/22/2024    MCV 90.3 05/22/2024     05/22/2024     I have spent 30 minutes with Patient and family today in which greater than 50% of this time was spent in counseling/coordination of care regarding Diagnostic results, Risks and benefits of tx options, Instructions for management, Patient and family education, Importance of tx compliance, Impressions, Counseling / Coordination of care, Documenting in the medical record, Reviewing / ordering tests, medicine, procedures  , " Obtaining or reviewing history  , and Communicating with other healthcare professionals .    Patient voiced understanding and agreement in the above discussion. Aware to contact our office with questions/symptoms in the interim.     This note has been generated by voice recognition software system.  Therefore, there may be spelling, grammar, and or syntax errors. Please contact if questions arise.

## 2024-06-05 ENCOUNTER — TELEPHONE (OUTPATIENT)
Dept: HEMATOLOGY ONCOLOGY | Facility: CLINIC | Age: 89
End: 2024-06-05

## 2024-06-05 NOTE — TELEPHONE ENCOUNTER
Patient Call    Who are you speaking with? Child    If it is not the patient, are they listed on an active communication consent form? Yes   What is the reason for this call? Silke is calling in to inform office that they are not interested in a repeat biopsy, but were interested in repeating patients CT scans.    Please reach out to Silke.    Does this require a call back? Yes   If a call back is required, please list best call back number [phone]   If a call back is required, advise that a message will be forwarded to their care team and someone will return their call as soon as possible.   Did you relay this information to the patient? Yes

## 2024-06-06 NOTE — TELEPHONE ENCOUNTER
I tried calling Silke (daughter). No answer, LMOM to call us back. FYI I messaged Dr. Short whether the CT should be ordered by us or med onc. If she calls back and one of you talk to her, please let her know I messaged him and that they also should make sure there med onc team is aware of there decision.     Amylyn Mortimer, PA-C  Thoracic Surgery

## 2024-06-07 ENCOUNTER — TELEPHONE (OUTPATIENT)
Dept: HEMATOLOGY ONCOLOGY | Facility: CLINIC | Age: 89
End: 2024-06-07

## 2024-06-07 NOTE — TELEPHONE ENCOUNTER
Patient Call    Who are you speaking with? Child    If it is not the patient, are they listed on an active communication consent form? Yes   What is the reason for this call? Returning Pilyyn's call   Does this require a call back? Yes   If a call back is required, please list [facility] call back number [phone]    If a call back is required, advise that a message will be forwarded to their care team and someone will return their call as soon as possible.   Did you relay this information to the patient? Yes

## 2024-06-07 NOTE — TELEPHONE ENCOUNTER
Called pt's daughter. I'm waiting to hear from Dr. Short if we should order scan or med onc. We will need to get back to them next week after we hear.     Amylyn Mortimer, PA-C  Thoracic Surgery

## 2024-06-10 DIAGNOSIS — R59.0 MEDIASTINAL LYMPHADENOPATHY: Primary | ICD-10-CM

## 2024-06-10 NOTE — PROGRESS NOTES
Spoke to Nereida, patient's daughter, regarding plan for Damaris. They elected for short interval CT scan. I spoke with medical oncology regarding this as well. We will order the scan and see her back in clinic after. Order placed.    Amylyn Mortimer, PA-C  Thoracic Surgery

## 2024-06-13 ENCOUNTER — OFFICE VISIT (OUTPATIENT)
Dept: FAMILY MEDICINE CLINIC | Facility: CLINIC | Age: 89
End: 2024-06-13
Payer: MEDICARE

## 2024-06-13 VITALS
OXYGEN SATURATION: 99 % | HEART RATE: 50 BPM | WEIGHT: 114.6 LBS | RESPIRATION RATE: 20 BRPM | HEIGHT: 62 IN | BODY MASS INDEX: 21.09 KG/M2 | SYSTOLIC BLOOD PRESSURE: 100 MMHG | TEMPERATURE: 98.4 F | DIASTOLIC BLOOD PRESSURE: 70 MMHG

## 2024-06-13 DIAGNOSIS — F02.C0 SEVERE LATE ONSET ALZHEIMER'S DEMENTIA WITHOUT BEHAVIORAL DISTURBANCE, PSYCHOTIC DISTURBANCE, MOOD DISTURBANCE, OR ANXIETY (HCC): ICD-10-CM

## 2024-06-13 DIAGNOSIS — G30.1 SEVERE LATE ONSET ALZHEIMER'S DEMENTIA WITHOUT BEHAVIORAL DISTURBANCE, PSYCHOTIC DISTURBANCE, MOOD DISTURBANCE, OR ANXIETY (HCC): ICD-10-CM

## 2024-06-13 DIAGNOSIS — N18.32 STAGE 3B CHRONIC KIDNEY DISEASE (HCC): Primary | ICD-10-CM

## 2024-06-13 DIAGNOSIS — R63.4 WEIGHT LOSS, ABNORMAL: ICD-10-CM

## 2024-06-13 PROBLEM — R41.3 MEMORY IMPAIRMENT: Status: RESOLVED | Noted: 2023-09-04 | Resolved: 2024-06-13

## 2024-06-13 PROBLEM — F02.80 LATE ONSET ALZHEIMER DEMENTIA (HCC): Status: ACTIVE | Noted: 2024-06-13

## 2024-06-13 LAB
DME PARACHUTE DELIVERY DATE REQUESTED: NORMAL
DME PARACHUTE ITEM DESCRIPTION: NORMAL
DME PARACHUTE ORDER STATUS: NORMAL
DME PARACHUTE SUPPLIER NAME: NORMAL
DME PARACHUTE SUPPLIER PHONE: NORMAL

## 2024-06-13 PROCEDURE — 99214 OFFICE O/P EST MOD 30 MIN: CPT | Performed by: FAMILY MEDICINE

## 2024-06-13 PROCEDURE — G2211 COMPLEX E/M VISIT ADD ON: HCPCS | Performed by: FAMILY MEDICINE

## 2024-06-13 NOTE — ASSESSMENT & PLAN NOTE
Lab Results   Component Value Date    EGFR 52 (L) 05/22/2024    EGFR 42 (L) 04/15/2024    EGFR 36 (L) 04/10/2024    CREATININE 1.04 (H) 05/22/2024    CREATININE 1.25 (H) 04/15/2024    CREATININE 1.41 (H) 04/10/2024   Better hydration and d/c of water pill

## 2024-06-13 NOTE — PROGRESS NOTES
"Ambulatory Visit  Name: Damaris Truong      : 1935      MRN: [id number]  Encounter Provider: Adriana Gresham DO  Encounter Date: 2024   Encounter department: [facility]    Assessment & Plan   1. Stage 3b chronic kidney disease (HCC)  Assessment & Plan:  Lab Results   Component Value Date    EGFR 52 (L) 2024    EGFR 42 (L) 04/15/2024    EGFR 36 (L) 04/10/2024    CREATININE 1.04 (H) 2024    CREATININE 1.25 (H) 04/15/2024    CREATININE 1.41 (H) 04/10/2024   Better hydration and d/c of water pill  2. Severe late onset Alzheimer's dementia without behavioral disturbance, psychotic disturbance, mood disturbance, or anxiety (HCC)  Assessment & Plan:  Family decided not to proceed with more cognitive testing    3. Weight loss, abnormal  Assessment & Plan:  Poor appetite due to dementia       History of Present Illness     Here for follow up with daughter  Eating better last week  This week not so much  Drinking more water  Discussed with daughter about life alert buttons  Bp hard to find on left  Has a walker but she doesn't like it  Has fallen        Review of Systems   Constitutional: Negative.    HENT: Negative.     Eyes: Negative.    Respiratory: Negative.     Cardiovascular: Negative.    Gastrointestinal: Negative.    Endocrine: Negative.    Genitourinary: Negative.    Musculoskeletal: Negative.    Allergic/Immunologic: Negative.    Neurological: Negative.    Psychiatric/Behavioral:  Positive for behavioral problems and confusion. Negative for agitation.        Objective     /70 (BP Location: Right arm, Patient Position: Sitting, Cuff Size: Standard)   Pulse (!) 50   Temp 98.4 °F (36.9 °C) (Tympanic)   Resp 20   Ht 5' 2\" (1.575 m)   Wt 52 kg (114 lb 9.6 oz)   SpO2 99%   BMI 20.96 kg/m²     Physical Exam  Vitals and nursing note reviewed.   Constitutional:       Appearance: She is well-developed.   HENT:      Head: Normocephalic and atraumatic.      Right Ear: " External ear normal.      Left Ear: External ear normal.      Nose: Nose normal.   Eyes:      Conjunctiva/sclera: Conjunctivae normal.      Pupils: Pupils are equal, round, and reactive to light.   Cardiovascular:      Rate and Rhythm: Normal rate and regular rhythm.      Heart sounds: Normal heart sounds.   Pulmonary:      Effort: Pulmonary effort is normal.      Breath sounds: Normal breath sounds.   Abdominal:      General: Bowel sounds are normal.      Palpations: Abdomen is soft.   Musculoskeletal:         General: Normal range of motion.      Cervical back: Normal range of motion and neck supple.   Skin:     General: Skin is warm and dry.      Capillary Refill: Capillary refill takes less than 2 seconds.   Neurological:      Mental Status: She is alert and oriented to person, place, and time.   Psychiatric:         Behavior: Behavior normal.         Cognition and Memory: Cognition is impaired. Memory is impaired. She exhibits impaired recent memory and impaired remote memory.       Administrative Statements

## 2024-06-17 LAB
DME PARACHUTE DELIVERY DATE ACTUAL: NORMAL
DME PARACHUTE DELIVERY DATE REQUESTED: NORMAL
DME PARACHUTE ITEM DESCRIPTION: NORMAL
DME PARACHUTE ORDER STATUS: NORMAL
DME PARACHUTE SUPPLIER NAME: NORMAL
DME PARACHUTE SUPPLIER PHONE: NORMAL

## 2024-07-01 ENCOUNTER — HOME CARE VISIT (OUTPATIENT)
Dept: HOME HEALTH SERVICES | Facility: HOME HEALTHCARE | Age: 89
End: 2024-07-01
Payer: MEDICARE

## 2024-07-01 ENCOUNTER — TELEPHONE (OUTPATIENT)
Dept: LAB | Facility: HOSPITAL | Age: 89
End: 2024-07-01

## 2024-07-01 ENCOUNTER — TELEMEDICINE (OUTPATIENT)
Dept: FAMILY MEDICINE CLINIC | Facility: CLINIC | Age: 89
End: 2024-07-01
Payer: MEDICARE

## 2024-07-01 DIAGNOSIS — N18.32 STAGE 3B CHRONIC KIDNEY DISEASE (HCC): ICD-10-CM

## 2024-07-01 DIAGNOSIS — G30.1 SEVERE LATE ONSET ALZHEIMER'S DEMENTIA WITHOUT BEHAVIORAL DISTURBANCE, PSYCHOTIC DISTURBANCE, MOOD DISTURBANCE, OR ANXIETY (HCC): Primary | ICD-10-CM

## 2024-07-01 DIAGNOSIS — R59.0 MEDIASTINAL ADENOPATHY: ICD-10-CM

## 2024-07-01 DIAGNOSIS — F02.C0 SEVERE LATE ONSET ALZHEIMER'S DEMENTIA WITHOUT BEHAVIORAL DISTURBANCE, PSYCHOTIC DISTURBANCE, MOOD DISTURBANCE, OR ANXIETY (HCC): Primary | ICD-10-CM

## 2024-07-01 PROCEDURE — 99214 OFFICE O/P EST MOD 30 MIN: CPT | Performed by: FAMILY MEDICINE

## 2024-07-01 PROCEDURE — G2211 COMPLEX E/M VISIT ADD ON: HCPCS | Performed by: FAMILY MEDICINE

## 2024-07-01 NOTE — Clinical Note
Seeking approval for RLOC.  89 YO female referred to hospice by PCP.  Dxs:  CAD, Thoracic aortic aneurysm, HTN, Carotid artery obstruction, Mediastinal adenopathy,  Alzheimer's dementia, CKD3, weight loss, Falls.    Virtual visit today with PCP Dr Gresham.  Worsening dementia, eating less incontinent of urine and bowel.  Very weak and needs assistance with walking and all ADLs.  Has lost 15 lbs in the last few months.  CT was done showing lymphadenopathy in the mediastinum. She is a former smoker but quit in 1980s.  In May Office visit with Dr Patel from Heme/Onc  S/p EBUS bx that is not conclusive of dx. Family then considered  possible mediastinoscopy with excisional biopsy.  But have declined.  Was to have repeat CT next Wednesday but family has declined that also.  PPS 30%  Seeking hospice at home.

## 2024-07-01 NOTE — Clinical Note
Daughter reached out this AM and pt is not doing well  She hasn't eaten all weekend..very weak  It took 3 of them to get her OOB.  Uncontrollable diarrhea and edema.swelling to lower extremities that are warm to the touch.  Communicating very little and sleeping all day and night

## 2024-07-01 NOTE — PROGRESS NOTES
Virtual Regular Visit    Verification of patient location:    Patient is located at Home in the following state in which I hold an active license [place]      Assessment/Plan:    Problem List Items Addressed This Visit     Stage 3b chronic kidney disease (HCC)    Relevant Orders    Comprehensive metabolic panel    Late onset Alzheimer dementia (HCC) - Primary     Worsening symptoms  Check labs  Home hospice           Relevant Orders    CBC    UA (URINE) with reflex to Scope    Ambulatory Referral to Hospice    Mediastinal adenopathy     Will not pursue further CT                  Reason for visit is   Chief Complaint   Patient presents with   • Follow-up     Patient being seen for follow up/ decline    • Virtual Regular Visit        Encounter provider Adriana Gresham DO      Recent Visits  No visits were found meeting these conditions.  Showing recent visits within past 7 days and meeting all other requirements  Today's Visits  Date Type Provider Dept   07/01/24 Telemedicine DO Ignacio Downing   Showing today's visits and meeting all other requirements  Future Appointments  No visits were found meeting these conditions.  Showing future appointments within next 150 days and meeting all other requirements       The patient was identified by name and date of birth. Damaris Truong was informed that this is a telemedicine visit and that the visit is being conducted through the [employer] platform. She agrees to proceed..  My office door was closed. No one else was in the room.  She acknowledged consent and understanding of privacy and security of the video platform. The patient has agreed to participate and understands they can discontinue the visit at any time.    Patient is aware this is a billable service.     Subjective  Damaris Truong is a 88 y.o. female worsening dementia  Per daughters eating less, incontient of urine  Some diarrhea  Needs help dressing, getting to bed  More confusion  No fever, no  pain .      Here with daughters and worsening confusion  Not eating  Declining fast           Past Medical History:   Diagnosis Date   • Aorta aneurysm (HCC)    • Basal cell carcinoma 06/14/2023    Left nasal tip   • Chronic kidney disease    • GERD (gastroesophageal reflux disease)    • HL (hearing loss)    • Hyperlipidemia    • Liver disease    • Memory loss        Past Surgical History:   Procedure Laterality Date   • ADENOIDECTOMY     • APPENDECTOMY     • BREAST BIOPSY Left    • BREAST LUMPECTOMY Left    • CATARACT EXTRACTION     • CERVICAL FUSION     • ENDOBRONCHIAL ULTRASOUND (EBUS) N/A 5/15/2024    Procedure: ENDOBRONCHIAL ULTRASOUND (EBUS) with biopsies;  Surgeon: Krishna Short DO;  Location: [facility];  Service: Thoracic   • HYSTERECTOMY      age 51   • MOHS SURGERY Left 06/14/2023    [facility]- Left nasal tip   • OOPHORECTOMY     • OTHER SURGICAL HISTORY      paravaginal defect graft-reinforced repair   • REPAIR RECTOCELE     • TONSILLECTOMY     • VEIN LIGATION AND STRIPPING Bilateral        Current Outpatient Medications   Medication Sig Dispense Refill   • atorvastatin (LIPITOR) 10 mg tablet TAKE 1 TABLET(10 MG) BY MOUTH DAILY 90 tablet 0   • metoprolol tartrate (LOPRESSOR) 25 mg tablet Take 1 tablet (25 mg total) by mouth every 12 (twelve) hours 180 tablet 1     No current facility-administered medications for this visit.        No Known Allergies    Review of Systems   Constitutional:  Positive for activity change, appetite change and unexpected weight change.   Psychiatric/Behavioral:  Positive for behavioral problems and confusion.        Video Exam    There were no vitals filed for this visit.    Physical Exam     Visit Time  Total Visit Duration: 15

## 2024-07-03 LAB
DME PARACHUTE DELIVERY DATE ACTUAL: NORMAL
DME PARACHUTE DELIVERY DATE ACTUAL: NORMAL
DME PARACHUTE DELIVERY DATE EXPECTED: NORMAL
DME PARACHUTE DELIVERY DATE EXPECTED: NORMAL
DME PARACHUTE DELIVERY DATE REQUESTED: NORMAL
DME PARACHUTE DELIVERY DATE REQUESTED: NORMAL
DME PARACHUTE ITEM DESCRIPTION: NORMAL
DME PARACHUTE ORDER STATUS: NORMAL
DME PARACHUTE ORDER STATUS: NORMAL
DME PARACHUTE SUPPLIER NAME: NORMAL
DME PARACHUTE SUPPLIER NAME: NORMAL
DME PARACHUTE SUPPLIER PHONE: NORMAL
DME PARACHUTE SUPPLIER PHONE: NORMAL

## 2024-07-10 ENCOUNTER — HOME CARE VISIT (OUTPATIENT)
Dept: HOME HOSPICE | Facility: HOSPICE | Age: 89
End: 2024-07-10
Payer: MEDICARE

## 2024-07-10 ENCOUNTER — HOME CARE VISIT (OUTPATIENT)
Dept: HOME HEALTH SERVICES | Facility: HOME HEALTHCARE | Age: 89
End: 2024-07-10
Payer: MEDICARE

## 2024-07-10 VITALS
DIASTOLIC BLOOD PRESSURE: 60 MMHG | RESPIRATION RATE: 16 BRPM | SYSTOLIC BLOOD PRESSURE: 110 MMHG | TEMPERATURE: 97 F | HEART RATE: 84 BPM

## 2024-07-10 PROCEDURE — G0299 HHS/HOSPICE OF RN EA 15 MIN: HCPCS

## 2024-07-12 ENCOUNTER — HOME CARE VISIT (OUTPATIENT)
Dept: HOME HEALTH SERVICES | Facility: HOME HEALTHCARE | Age: 89
End: 2024-07-12
Payer: MEDICARE

## 2024-07-12 ENCOUNTER — HOME CARE VISIT (OUTPATIENT)
Dept: HOME HOSPICE | Facility: HOSPICE | Age: 89
End: 2024-07-12
Payer: MEDICARE

## 2024-07-12 VITALS
SYSTOLIC BLOOD PRESSURE: 80 MMHG | HEART RATE: 104 BPM | DIASTOLIC BLOOD PRESSURE: 60 MMHG | RESPIRATION RATE: 16 BRPM | TEMPERATURE: 97.9 F

## 2024-07-12 PROCEDURE — G0156 HHCP-SVS OF AIDE,EA 15 MIN: HCPCS

## 2024-07-12 PROCEDURE — G0299 HHS/HOSPICE OF RN EA 15 MIN: HCPCS

## 2024-07-12 PROCEDURE — G0155 HHCP-SVS OF CSW,EA 15 MIN: HCPCS

## 2024-07-13 ENCOUNTER — HOME CARE VISIT (OUTPATIENT)
Dept: HOME HEALTH SERVICES | Facility: HOME HEALTHCARE | Age: 89
End: 2024-07-13
Payer: MEDICARE

## 2024-07-13 PROCEDURE — G0156 HHCP-SVS OF AIDE,EA 15 MIN: HCPCS

## 2024-07-14 ENCOUNTER — HOME CARE VISIT (OUTPATIENT)
Dept: HOME HEALTH SERVICES | Facility: HOME HEALTHCARE | Age: 89
End: 2024-07-14
Payer: MEDICARE

## 2024-07-14 VITALS — SYSTOLIC BLOOD PRESSURE: 80 MMHG | HEART RATE: 76 BPM | RESPIRATION RATE: 18 BRPM | DIASTOLIC BLOOD PRESSURE: 58 MMHG

## 2024-07-14 PROCEDURE — G0299 HHS/HOSPICE OF RN EA 15 MIN: HCPCS

## 2024-07-15 ENCOUNTER — HOME CARE VISIT (OUTPATIENT)
Dept: HOME HEALTH SERVICES | Facility: HOME HEALTHCARE | Age: 89
End: 2024-07-15
Payer: MEDICARE

## 2024-07-15 PROCEDURE — G0156 HHCP-SVS OF AIDE,EA 15 MIN: HCPCS

## 2024-07-16 ENCOUNTER — HOME CARE VISIT (OUTPATIENT)
Dept: HOME HEALTH SERVICES | Facility: HOME HEALTHCARE | Age: 89
End: 2024-07-16
Payer: MEDICARE

## 2024-07-16 PROCEDURE — G0299 HHS/HOSPICE OF RN EA 15 MIN: HCPCS

## 2024-07-16 PROCEDURE — G0156 HHCP-SVS OF AIDE,EA 15 MIN: HCPCS

## 2024-07-17 ENCOUNTER — HOME CARE VISIT (OUTPATIENT)
Dept: HOME HEALTH SERVICES | Facility: HOME HEALTHCARE | Age: 89
End: 2024-07-17
Payer: MEDICARE

## 2024-07-17 VITALS — HEART RATE: 112 BPM

## 2024-07-17 PROCEDURE — G0299 HHS/HOSPICE OF RN EA 15 MIN: HCPCS

## 2024-07-17 PROCEDURE — G0156 HHCP-SVS OF AIDE,EA 15 MIN: HCPCS

## 2024-07-18 ENCOUNTER — HOME CARE VISIT (OUTPATIENT)
Dept: HOME HEALTH SERVICES | Facility: HOME HEALTHCARE | Age: 89
End: 2024-07-18
Payer: MEDICARE

## 2024-07-18 VITALS
HEART RATE: 112 BPM | TEMPERATURE: 98 F | SYSTOLIC BLOOD PRESSURE: 80 MMHG | RESPIRATION RATE: 18 BRPM | DIASTOLIC BLOOD PRESSURE: 60 MMHG

## 2024-07-18 PROCEDURE — G0156 HHCP-SVS OF AIDE,EA 15 MIN: HCPCS

## 2024-07-18 PROCEDURE — G0299 HHS/HOSPICE OF RN EA 15 MIN: HCPCS

## 2024-07-19 ENCOUNTER — HOME CARE VISIT (OUTPATIENT)
Dept: HOME HEALTH SERVICES | Facility: HOME HEALTHCARE | Age: 89
End: 2024-07-19
Payer: MEDICARE

## 2024-07-19 VITALS — HEART RATE: 108 BPM | RESPIRATION RATE: 18 BRPM

## 2024-07-19 PROCEDURE — G0299 HHS/HOSPICE OF RN EA 15 MIN: HCPCS

## 2024-08-08 ENCOUNTER — HOME CARE VISIT (OUTPATIENT)
Dept: HOME HOSPICE | Facility: HOSPICE | Age: 89
End: 2024-08-08
Payer: MEDICARE

## (undated) DEVICE — STERILE EMESIS BASIN                 070: Brand: CARDINAL HEALTH

## (undated) DEVICE — GAUZE SPONGES,16 PLY: Brand: CURITY

## (undated) DEVICE — SYRINGE 10ML SLIP TIP LF

## (undated) DEVICE — SYRINGE 20ML LL

## (undated) DEVICE — NEEDLE TBNA EBUS 19G VIZISHOT2 FLEX

## (undated) DEVICE — TUBING SUCTION 5MM X 12 FT

## (undated) DEVICE — SINGLE USE BIOPSY VALVE MAJ-210: Brand: SINGLE USE BIOPSY VALVE (STERILE)

## (undated) DEVICE — STOPCOCK 4-WAY

## (undated) DEVICE — SINGLE USE SUCTION VALVE MAJ-209: Brand: SINGLE USE SUCTION VALVE (STERILE)

## (undated) DEVICE — SYRINGE 10ML LL

## (undated) DEVICE — UTILITY MARKER,BLACK WITH LABELS: Brand: DEVON

## (undated) DEVICE — STERILE CUP W/ LID, 2 OZ 2 PK       067: Brand: CARDINAL HEALTH

## (undated) DEVICE — Device: Brand: BALLOON

## (undated) DEVICE — ADAPTOR TRACH SWIVEL

## (undated) DEVICE — IV EXTENSION TUBING 33 IN

## (undated) DEVICE — HEAVY DUTY TABLE COVER: Brand: CONVERTORS

## (undated) DEVICE — FIRST STEP BEDSIDE KIT - STAND-UP POUCH, ENDOSCOPIC CLEANING PAD - 1 POUCH: Brand: FIRST STEP BEDSIDE KIT - STAND-UP POUCH, ENDOSCOPIC CLEANING PAD